# Patient Record
Sex: MALE | Race: ASIAN | NOT HISPANIC OR LATINO
[De-identification: names, ages, dates, MRNs, and addresses within clinical notes are randomized per-mention and may not be internally consistent; named-entity substitution may affect disease eponyms.]

---

## 2017-06-08 ENCOUNTER — APPOINTMENT (OUTPATIENT)
Dept: CARDIOLOGY | Facility: CLINIC | Age: 67
End: 2017-06-08

## 2017-06-22 ENCOUNTER — APPOINTMENT (OUTPATIENT)
Dept: CARDIOLOGY | Facility: CLINIC | Age: 67
End: 2017-06-22

## 2017-06-22 VITALS
HEIGHT: 66 IN | BODY MASS INDEX: 30.53 KG/M2 | HEART RATE: 61 BPM | SYSTOLIC BLOOD PRESSURE: 118 MMHG | RESPIRATION RATE: 15 BRPM | DIASTOLIC BLOOD PRESSURE: 70 MMHG | WEIGHT: 190 LBS

## 2017-08-24 ENCOUNTER — APPOINTMENT (OUTPATIENT)
Dept: CARDIOLOGY | Facility: CLINIC | Age: 67
End: 2017-08-24

## 2018-01-11 ENCOUNTER — APPOINTMENT (OUTPATIENT)
Dept: CARDIOLOGY | Facility: CLINIC | Age: 68
End: 2018-01-11
Payer: MEDICARE

## 2018-01-11 VITALS
HEART RATE: 74 BPM | SYSTOLIC BLOOD PRESSURE: 112 MMHG | DIASTOLIC BLOOD PRESSURE: 64 MMHG | BODY MASS INDEX: 29.73 KG/M2 | WEIGHT: 185 LBS | RESPIRATION RATE: 16 BRPM | HEIGHT: 66 IN

## 2018-01-11 PROCEDURE — 99214 OFFICE O/P EST MOD 30 MIN: CPT

## 2018-04-12 ENCOUNTER — APPOINTMENT (OUTPATIENT)
Dept: CARDIOLOGY | Facility: CLINIC | Age: 68
End: 2018-04-12
Payer: MEDICARE

## 2018-04-12 VITALS
HEART RATE: 60 BPM | TEMPERATURE: 98.9 F | BODY MASS INDEX: 28.08 KG/M2 | OXYGEN SATURATION: 97 % | RESPIRATION RATE: 17 BRPM | DIASTOLIC BLOOD PRESSURE: 83 MMHG | SYSTOLIC BLOOD PRESSURE: 135 MMHG | WEIGHT: 174 LBS

## 2018-04-12 PROCEDURE — 93000 ELECTROCARDIOGRAM COMPLETE: CPT

## 2018-04-12 PROCEDURE — 99215 OFFICE O/P EST HI 40 MIN: CPT

## 2018-05-01 ENCOUNTER — APPOINTMENT (OUTPATIENT)
Dept: CARDIOLOGY | Facility: CLINIC | Age: 68
End: 2018-05-01
Payer: MEDICARE

## 2018-05-01 PROCEDURE — A9500: CPT

## 2018-05-01 PROCEDURE — 93015 CV STRESS TEST SUPVJ I&R: CPT

## 2018-05-01 PROCEDURE — 93306 TTE W/DOPPLER COMPLETE: CPT

## 2018-05-01 PROCEDURE — 78452 HT MUSCLE IMAGE SPECT MULT: CPT

## 2018-05-10 ENCOUNTER — APPOINTMENT (OUTPATIENT)
Dept: CARDIOLOGY | Facility: CLINIC | Age: 68
End: 2018-05-10
Payer: MEDICARE

## 2018-05-10 VITALS
DIASTOLIC BLOOD PRESSURE: 73 MMHG | RESPIRATION RATE: 18 BRPM | HEART RATE: 51 BPM | SYSTOLIC BLOOD PRESSURE: 142 MMHG | WEIGHT: 175 LBS | BODY MASS INDEX: 28.25 KG/M2 | OXYGEN SATURATION: 97 % | TEMPERATURE: 97.8 F

## 2018-05-10 PROCEDURE — 99215 OFFICE O/P EST HI 40 MIN: CPT | Mod: 25

## 2018-05-10 PROCEDURE — 99406 BEHAV CHNG SMOKING 3-10 MIN: CPT

## 2018-05-14 VITALS
SYSTOLIC BLOOD PRESSURE: 138 MMHG | HEART RATE: 55 BPM | HEIGHT: 66 IN | RESPIRATION RATE: 18 BRPM | TEMPERATURE: 98 F | DIASTOLIC BLOOD PRESSURE: 75 MMHG | OXYGEN SATURATION: 98 % | WEIGHT: 190.04 LBS

## 2018-05-14 NOTE — H&P ADULT - FAMILY HISTORY
Sibling  Still living? No  Family history of stroke, Age at diagnosis: Age Unknown  Family history of cerebrovascular accident (CVA), Age at diagnosis: 71-80  Family history of acute myocardial infarction, Age at diagnosis: 71-80     Mother  Still living? Unknown  Family history of cerebrovascular accident (CVA), Age at diagnosis: 71-80

## 2018-05-14 NOTE — H&P ADULT - HISTORY OF PRESENT ILLNESS
**Obtained information from pt's wife Noah Wong (reliable)  ***VERIFY MEDICATION    67 yo Upper sorbian speaking, CURRENT SMOKER (has smoked for 30 years) male with PMHx of HTN, HLD, CAD (known residual mLAD 100%, dLAD 100% with retrograde filling from RCA s/p failed  PCI of LAD in 1/2015), chronic systolic CHF (EF 35%, moderate-severe MR), DM II, BPH who presented to Dr. Fabian's office with c/o progressively worsening fatigue and diaphoresis upon exertion of 5-10 city blocks resolving with minutes of rest over past few months.  Pt denies SOB, dizziness, palpitations,  LE edema, orthopnea, PND, syncope, N/V. EKG on 4/12/18 revealed NSR + LBBB. NST on 5/1/18 revealed LV enlarged, medium sized to moderate/severe defects in anterior and anteroseptal, apical walls that are fixed, suggestive of moderate lAD infarction with minimal kamilah-infarct ischemia. Medium sized, moderate to severe defects in inferior and inferolateral walls partially reversible, suggestive of moderate RCA/LCx infarction with mild kamilah-infarct ischemia. , ECHO 5/1/18 revealed MVP involving posterior mitral leaflet, mitral annual calcification, moderate MR, normal LV internal dimensions and wall thickness, severe segmental LV systolic dysfunction (apex, mid to distal anterior and anteroseptal walls appear kinetic, overall LVEF 35%), mild diastolic dysfunction (stage I).  Due to pts risk factors, CCS Angina Class II Equivalent Sx, abnormal NST and known physiologically significant lesion, pt is referred for cardiac catheterization w/ possible intervention.     Cath hx:  1/15/15 @ [facility]: LMCA normal, pLAD with minor luminal irregularities, mLAD 100%, dLAD 100% with retrograde filling from RCA with failed  PCI of LAD with recommendation to reattempt in 6 weeks.

## 2018-05-14 NOTE — H&P ADULT - ASSESSMENT
69 yo Lao speaking, CURRENT SMOKER (has smoked for 30 years) male with PMHx of HTN, HLD, CAD (known residual mLAD 100%, dLAD 100% with retrograde filling from RCA s/p failed  PCI of LAD in 1/2015), chronic systolic CHF (EF 35%, moderate-severe MR), DM II, BPH who presents for recommended LHC with possible intervention if clinically indicated secondary to CCS Anginal Class 2 Equivalent Symptoms in the setting of an abnormal NST.        ASA: III and Mallampati: III    -Precath/Consented  - IVF Hydration NS @ 75cc/hr.  -Loaded with ASA 325mg PO X 1 and Plavix 600mg PO X 1 69 yo Bulgarian speaking, CURRENT SMOKER (has smoked for 30 years) male with PMHx of HTN, HLD, CAD (known residual mLAD 100%, dLAD 100% with retrograde filling from RCA s/p failed  PCI of LAD in 1/2015), chronic systolic CHF (EF 35%, moderate-severe MR), DM II, BPH who presents for recommended LHC with possible intervention if clinically indicated secondary to CCS Anginal Class 2 Equivalent Symptoms in the setting of an abnormal NST.        ASA: III and Mallampati: III    -Precath/Consented  - Chronic Systolic HF w/ moderate to severe MR.  Will Keep IVF Hydration NS @ 50cc/hr.  -Pt received ASA 325mg PO X 1 and Plavix 75mg PO X 1 67 yo Swedish speaking, CURRENT SMOKER (has smoked for 30 years) male with PMHx of HTN, HLD, CAD (known residual mLAD 100%, dLAD 100% with retrograde filling from RCA s/p failed  PCI of LAD in 1/2015), chronic systolic CHF (EF 35%, moderate-severe MR), DM II, BPH who presents for recommended LHC with possible intervention if clinically indicated secondary to CCS Anginal Class 2 Equivalent Symptoms in the setting of an abnormal NST.        ASA: III and Mallampati: III    -Precath/Consented  - Chronic Systolic HF w/ moderate to severe MR.  Will Keep IVF Hydration 1/2 NS @ 50cc/hr.  -Pt received ASA 325mg PO X 1 and Plavix 75mg PO X 1

## 2018-05-14 NOTE — H&P ADULT - NSHPSOCIALHISTORY_GEN_ALL_CORE
Denies alcohol/elicit drug use.    Has smoked for 30 years (used to smoke 1 PPD but recently cut back to 1 pack per week)

## 2018-05-14 NOTE — H&P ADULT - PMH
Atherosclerosis of coronary artery  CAD (coronary artery disease)  Benign prostatic hypertrophy    DM (diabetes mellitus)    Essential hypertension  Hypertension  HLD (hyperlipidemia)    HTN (hypertension)    Hyperlipidemia  Hyperlipidemia  Type 2 diabetes mellitus  Diabetes

## 2018-05-15 ENCOUNTER — INPATIENT (INPATIENT)
Facility: HOSPITAL | Age: 68
LOS: 0 days | Discharge: HOME | DRG: 247 | End: 2018-05-16
Attending: INTERNAL MEDICINE | Admitting: INTERNAL MEDICINE
Payer: COMMERCIAL

## 2018-05-15 LAB
GLUCOSE BLDC GLUCOMTR-MCNC: 108 MG/DL — HIGH (ref 70–99)
GLUCOSE BLDC GLUCOMTR-MCNC: 108 MG/DL — HIGH (ref 70–99)
GLUCOSE BLDC GLUCOMTR-MCNC: 121 MG/DL — HIGH (ref 70–99)
GLUCOSE BLDC GLUCOMTR-MCNC: 170 MG/DL — HIGH (ref 70–99)

## 2018-05-15 PROCEDURE — 93010 ELECTROCARDIOGRAM REPORT: CPT

## 2018-05-15 PROCEDURE — 99222 1ST HOSP IP/OBS MODERATE 55: CPT

## 2018-05-15 PROCEDURE — 92928 PRQ TCAT PLMT NTRAC ST 1 LES: CPT | Mod: RC

## 2018-05-15 PROCEDURE — 93458 L HRT ARTERY/VENTRICLE ANGIO: CPT | Mod: 26,XU

## 2018-05-15 RX ORDER — CLOPIDOGREL BISULFATE 75 MG/1
75 TABLET, FILM COATED ORAL DAILY
Qty: 0 | Refills: 0 | Status: DISCONTINUED | OUTPATIENT
Start: 2018-05-15 | End: 2018-05-16

## 2018-05-15 RX ORDER — MOMETASONE FUROATE 1 MG/G
1 CREAM TOPICAL
Qty: 0 | Refills: 0 | COMMUNITY

## 2018-05-15 RX ORDER — DEXTROSE 50 % IN WATER 50 %
25 SYRINGE (ML) INTRAVENOUS ONCE
Qty: 0 | Refills: 0 | Status: DISCONTINUED | OUTPATIENT
Start: 2018-05-15 | End: 2018-05-16

## 2018-05-15 RX ORDER — ATORVASTATIN CALCIUM 80 MG/1
40 TABLET, FILM COATED ORAL DAILY
Qty: 0 | Refills: 0 | Status: DISCONTINUED | OUTPATIENT
Start: 2018-05-15 | End: 2018-05-16

## 2018-05-15 RX ORDER — DEXTROSE 50 % IN WATER 50 %
12.5 SYRINGE (ML) INTRAVENOUS ONCE
Qty: 0 | Refills: 0 | Status: DISCONTINUED | OUTPATIENT
Start: 2018-05-15 | End: 2018-05-16

## 2018-05-15 RX ORDER — METOPROLOL TARTRATE 50 MG
50 TABLET ORAL DAILY
Qty: 0 | Refills: 0 | Status: DISCONTINUED | OUTPATIENT
Start: 2018-05-15 | End: 2018-05-16

## 2018-05-15 RX ORDER — AMLODIPINE BESYLATE 2.5 MG/1
5 TABLET ORAL DAILY
Qty: 0 | Refills: 0 | Status: DISCONTINUED | OUTPATIENT
Start: 2018-05-15 | End: 2018-05-16

## 2018-05-15 RX ORDER — SODIUM CHLORIDE 9 MG/ML
1000 INJECTION, SOLUTION INTRAVENOUS
Qty: 0 | Refills: 0 | Status: DISCONTINUED | OUTPATIENT
Start: 2018-05-15 | End: 2018-05-16

## 2018-05-15 RX ORDER — LISINOPRIL 2.5 MG/1
20 TABLET ORAL DAILY
Qty: 0 | Refills: 0 | Status: DISCONTINUED | OUTPATIENT
Start: 2018-05-16 | End: 2018-05-16

## 2018-05-15 RX ORDER — SODIUM CHLORIDE 9 MG/ML
500 INJECTION, SOLUTION INTRAVENOUS
Qty: 0 | Refills: 0 | Status: DISCONTINUED | OUTPATIENT
Start: 2018-05-15 | End: 2018-05-16

## 2018-05-15 RX ORDER — METOPROLOL TARTRATE 50 MG
1 TABLET ORAL
Qty: 0 | Refills: 0 | COMMUNITY

## 2018-05-15 RX ORDER — HYDROCHLOROTHIAZIDE 25 MG
25 TABLET ORAL DAILY
Qty: 0 | Refills: 0 | Status: DISCONTINUED | OUTPATIENT
Start: 2018-05-15 | End: 2018-05-16

## 2018-05-15 RX ORDER — DEXTROSE 50 % IN WATER 50 %
15 SYRINGE (ML) INTRAVENOUS ONCE
Qty: 0 | Refills: 0 | Status: DISCONTINUED | OUTPATIENT
Start: 2018-05-15 | End: 2018-05-16

## 2018-05-15 RX ORDER — ASPIRIN/CALCIUM CARB/MAGNESIUM 324 MG
81 TABLET ORAL DAILY
Qty: 0 | Refills: 0 | Status: DISCONTINUED | OUTPATIENT
Start: 2018-05-15 | End: 2018-05-16

## 2018-05-15 RX ORDER — CLOPIDOGREL BISULFATE 75 MG/1
1 TABLET, FILM COATED ORAL
Qty: 0 | Refills: 0 | COMMUNITY

## 2018-05-15 RX ORDER — INSULIN LISPRO 100/ML
VIAL (ML) SUBCUTANEOUS
Qty: 0 | Refills: 0 | Status: DISCONTINUED | OUTPATIENT
Start: 2018-05-15 | End: 2018-05-16

## 2018-05-15 RX ORDER — GLUCAGON INJECTION, SOLUTION 0.5 MG/.1ML
1 INJECTION, SOLUTION SUBCUTANEOUS ONCE
Qty: 0 | Refills: 0 | Status: DISCONTINUED | OUTPATIENT
Start: 2018-05-15 | End: 2018-05-16

## 2018-05-15 RX ORDER — TAMSULOSIN HYDROCHLORIDE 0.4 MG/1
0.4 CAPSULE ORAL DAILY
Qty: 0 | Refills: 0 | Status: DISCONTINUED | OUTPATIENT
Start: 2018-05-15 | End: 2018-05-16

## 2018-05-15 RX ADMIN — ATORVASTATIN CALCIUM 40 MILLIGRAM(S): 80 TABLET, FILM COATED ORAL at 17:41

## 2018-05-15 RX ADMIN — TAMSULOSIN HYDROCHLORIDE 0.4 MILLIGRAM(S): 0.4 CAPSULE ORAL at 17:41

## 2018-05-15 NOTE — PATIENT PROFILE ADULT. - SPIRITUAL CULTURAL, RELIGIOUS PRACTICES/VALUES, PROFILE
ED Medical Screen (RME)





- General


Chief Complaint: Fall Injury


Stated Complaint: FALL/SHOULDER AND NECK PAIN


Time Seen by Provider: 01/31/18 13:52


Mode of Arrival: Ambulatory


Information source: Patient


TRAVEL OUTSIDE OF THE [place] IN LAST 30 DAYS: No





- HPI


Patient complains to provider of: fall


Onset: Yesterday - pt fell last night (has had recent neck surgery) and has 

pain in neck, L shoulder and hip.  Denies LOC





- Related Data


Allergies/Adverse Reactions: 


 





Penicillins Allergy (Verified 01/31/18 13:22)


 











Past Medical History





- Social History


Frequency of alcohol use: Rare


Drug Abuse: None


Renal/ Medical History: Denies: Hx Peritoneal Dialysis





Physical Exam





- Vital signs


Vitals: 





 











Temp Pulse Resp BP Pulse Ox


 


 98.4 F   125 H  20   109/54 L  99 


 


 01/31/18 13:30  01/31/18 13:30  01/31/18 13:30  01/31/18 13:30  01/31/18 13:30














Course





- Vital Signs


Vital signs: 





 











Temp Pulse Resp BP Pulse Ox


 


 98.4 F   125 H  20   109/54 L  99 


 


 01/31/18 13:30  01/31/18 13:30  01/31/18 13:30  01/31/18 13:30  01/31/18 13:30 none

## 2018-05-16 ENCOUNTER — TRANSCRIPTION ENCOUNTER (OUTPATIENT)
Age: 68
End: 2018-05-16

## 2018-05-16 VITALS — WEIGHT: 189.6 LBS

## 2018-05-16 LAB
ALBUMIN SERPL ELPH-MCNC: 4.2 G/DL — SIGNIFICANT CHANGE UP (ref 3.3–5)
ALP SERPL-CCNC: 72 U/L — SIGNIFICANT CHANGE UP (ref 40–120)
ALT FLD-CCNC: 35 U/L — SIGNIFICANT CHANGE UP (ref 10–45)
ANION GAP SERPL CALC-SCNC: 10 MMOL/L — SIGNIFICANT CHANGE UP (ref 5–17)
AST SERPL-CCNC: 24 U/L — SIGNIFICANT CHANGE UP (ref 10–40)
BILIRUB SERPL-MCNC: 0.5 MG/DL — SIGNIFICANT CHANGE UP (ref 0.2–1.2)
BUN SERPL-MCNC: 20 MG/DL — SIGNIFICANT CHANGE UP (ref 7–23)
CALCIUM SERPL-MCNC: 9.3 MG/DL — SIGNIFICANT CHANGE UP (ref 8.4–10.5)
CHLORIDE SERPL-SCNC: 100 MMOL/L — SIGNIFICANT CHANGE UP (ref 96–108)
CO2 SERPL-SCNC: 29 MMOL/L — SIGNIFICANT CHANGE UP (ref 22–31)
CREAT SERPL-MCNC: 1.26 MG/DL — SIGNIFICANT CHANGE UP (ref 0.5–1.3)
GLUCOSE BLDC GLUCOMTR-MCNC: 126 MG/DL — HIGH (ref 70–99)
GLUCOSE BLDC GLUCOMTR-MCNC: 199 MG/DL — HIGH (ref 70–99)
GLUCOSE SERPL-MCNC: 129 MG/DL — HIGH (ref 70–99)
HCT VFR BLD CALC: 43.3 % — SIGNIFICANT CHANGE UP (ref 39–50)
HGB BLD-MCNC: 14.8 G/DL — SIGNIFICANT CHANGE UP (ref 13–17)
MAGNESIUM SERPL-MCNC: 2 MG/DL — SIGNIFICANT CHANGE UP (ref 1.6–2.6)
MCHC RBC-ENTMCNC: 29.6 PG — SIGNIFICANT CHANGE UP (ref 27–34)
MCHC RBC-ENTMCNC: 34.2 G/DL — SIGNIFICANT CHANGE UP (ref 32–36)
MCV RBC AUTO: 86.6 FL — SIGNIFICANT CHANGE UP (ref 80–100)
PLATELET # BLD AUTO: 150 K/UL — SIGNIFICANT CHANGE UP (ref 150–400)
POTASSIUM SERPL-MCNC: 4.1 MMOL/L — SIGNIFICANT CHANGE UP (ref 3.5–5.3)
POTASSIUM SERPL-SCNC: 4.1 MMOL/L — SIGNIFICANT CHANGE UP (ref 3.5–5.3)
PROT SERPL-MCNC: 7 G/DL — SIGNIFICANT CHANGE UP (ref 6–8.3)
RBC # BLD: 5 M/UL — SIGNIFICANT CHANGE UP (ref 4.2–5.8)
RBC # FLD: 13.3 % — SIGNIFICANT CHANGE UP (ref 10.3–16.9)
SODIUM SERPL-SCNC: 139 MMOL/L — SIGNIFICANT CHANGE UP (ref 135–145)
WBC # BLD: 8.2 K/UL — SIGNIFICANT CHANGE UP (ref 3.8–10.5)
WBC # FLD AUTO: 8.2 K/UL — SIGNIFICANT CHANGE UP (ref 3.8–10.5)

## 2018-05-16 PROCEDURE — 93010 ELECTROCARDIOGRAM REPORT: CPT

## 2018-05-16 PROCEDURE — 99217: CPT

## 2018-05-16 RX ORDER — ASPIRIN/CALCIUM CARB/MAGNESIUM 324 MG
1 TABLET ORAL
Qty: 30 | Refills: 11
Start: 2018-05-16 | End: 2019-05-10

## 2018-05-16 RX ORDER — CLOPIDOGREL BISULFATE 75 MG/1
1 TABLET, FILM COATED ORAL
Qty: 30 | Refills: 11
Start: 2018-05-16 | End: 2019-05-10

## 2018-05-16 RX ORDER — ATORVASTATIN CALCIUM 80 MG/1
1 TABLET, FILM COATED ORAL
Qty: 30 | Refills: 0
Start: 2018-05-16 | End: 2018-06-14

## 2018-05-16 RX ORDER — ATORVASTATIN CALCIUM 80 MG/1
1 TABLET, FILM COATED ORAL
Qty: 30 | Refills: 0 | OUTPATIENT
Start: 2018-05-16 | End: 2018-06-14

## 2018-05-16 RX ADMIN — Medication 81 MILLIGRAM(S): at 11:14

## 2018-05-16 RX ADMIN — Medication 1: at 11:47

## 2018-05-16 RX ADMIN — ATORVASTATIN CALCIUM 40 MILLIGRAM(S): 80 TABLET, FILM COATED ORAL at 11:14

## 2018-05-16 RX ADMIN — TAMSULOSIN HYDROCHLORIDE 0.4 MILLIGRAM(S): 0.4 CAPSULE ORAL at 11:14

## 2018-05-16 RX ADMIN — Medication 50 MILLIGRAM(S): at 06:28

## 2018-05-16 RX ADMIN — CLOPIDOGREL BISULFATE 75 MILLIGRAM(S): 75 TABLET, FILM COATED ORAL at 11:14

## 2018-05-16 RX ADMIN — LISINOPRIL 20 MILLIGRAM(S): 2.5 TABLET ORAL at 06:30

## 2018-05-16 RX ADMIN — AMLODIPINE BESYLATE 5 MILLIGRAM(S): 2.5 TABLET ORAL at 06:28

## 2018-05-16 RX ADMIN — Medication 25 MILLIGRAM(S): at 06:28

## 2018-05-16 NOTE — DISCHARGE NOTE ADULT - PLAN OF CARE
MD follow-up, Medication Compliance CONTINUE ASPIRIN AND PLAVIX (CLOPIDOGREL) DAILY. Continue current listed medical regimen. See Dr. Fabian on 5/31/18. See Primary Doctor in 1 week  Monitor right groin/leg for swelling, bleeding, discharge, pain or skin discoloration if any of these findings are noted go to nearest ER, seek medical attention immediately and call [phone] if any questions. If chest pain, shortness of breath, dizziness noted call MD immediately and seek medical attention.  Avoid hot tubs, swimming pools and baths for 1 week. Avoid lifting more than 3 pounds for one week, avoid exertional movements such intimacy, running, jumping, etc for 1 week Continue listed medical regimen. MD follow-up Lipitor increased to 40mg daily MD follow-up Monitor weight daily. If lower extremity swelling or shortness of breath or increased weight gain noted call MD and seek immediate medical attention. Low salt diet. 1 liter fluid restriction. Continue current medical regimen. See Dr. Fabian on 5/31/18. See Primary Doctor in 1 week HOLD METFORMIN AND RESUME ON 5/18/18. Check fingerstick three times daily before meals and at bedtime. If fingerstick greater than 200 or less than 80 call MD for further instructions. Avoid concentrated sweets. Follow diabetic diet. MD follow-up Lipitor increased to 40mg daily. MD follow-up

## 2018-05-16 NOTE — DISCHARGE NOTE ADULT - ADDITIONAL INSTRUCTIONS
*Dr. Fabian will schedule a CAT scan on follow-up to further evaluate your aorta for dilation*. Dr. Fabian is aware

## 2018-05-16 NOTE — DISCHARGE NOTE ADULT - PATIENT PORTAL LINK FT
You can access the [facility] Portal, offered by [facility], by registering with the following website: [email or web address]

## 2018-05-16 NOTE — DISCHARGE NOTE ADULT - MEDICATION SUMMARY - MEDICATIONS TO CHANGE
I will SWITCH the dose or number of times a day I take the medications listed below when I get home from the hospital:  None I will SWITCH the dose or number of times a day I take the medications listed below when I get home from the hospital:    atorvastatin 20 mg oral tablet  -- 1 tab(s) by mouth once a day (at bedtime)

## 2018-05-16 NOTE — DISCHARGE NOTE ADULT - MEDICATION SUMMARY - MEDICATIONS TO TAKE
I will START or STAY ON the medications listed below when I get home from the hospital:    Aspirin Enteric Coated 81 mg oral delayed release tablet  -- 1 tab(s) by mouth once a day  -- Indication: For Coronary Artery Disease and Stent    Flomax 0.4 mg oral capsule  -- 1 cap(s) by mouth once a day  -- Indication: For Benign Prostate Hypertrophy    metFORMIN 500 mg oral tablet  -- 1 tab(s) by mouth 2 times a day  HOLD AND RESTART ON 5/18/18  -- Indication: For Diabetes - Hold and Restart on 5/18/18    atorvastatin 40 mg oral tablet  -- 1 tab(s) by mouth once a day  -- Indication: For Cholesterol / Coronary Artery Disease    lisinopril-hydroCHLOROthiazide 20 mg-25 mg oral tablet  -- 1 tab(s) by mouth once a day  -- Indication: For Hypertension    Plavix 75 mg oral tablet  -- 1 tab(s) by mouth once a day  -- Indication: For Coronary Artery Disease and Stent    metoprolol extended release 50 mg oral tablet, extended release  -- 1 tab(s) by mouth once a day  -- Indication: For Hypertension / Coronary Artery Disease     amLODIPine 5 mg oral tablet  -- 1 tab(s) by mouth once a day  -- Indication: For Hypertension

## 2018-05-16 NOTE — DISCHARGE NOTE ADULT - SECONDARY DIAGNOSIS.
HTN (hypertension) HLD (hyperlipidemia) DM (diabetes mellitus) Chronic systolic congestive heart failure

## 2018-05-16 NOTE — DISCHARGE NOTE ADULT - CARE PROVIDER_API CALL
Vivek Fabian), Cardiovascular Disease; Internal Medicine  [address] [place] [address]  Phone: [phone]  Fax: [phone]

## 2018-05-16 NOTE — DISCHARGE NOTE ADULT - HOSPITAL COURSE
69 y/o Portuguese Speaking, Current Smoker w/ PMHX HTN, HLD, DM CAD s/p Prior PCI, Chronic Systolic CHF EF 35%, Known MR, Known CAD, w/ CCS Angina Class 2 Sx, Abnormal NST    S/p Cardiac Cath 5/15/18: HUGO RPDA, EF 35%, dilated ascending aorta noted on cath, for outpatient CT Angiography as per Dr. Wong    This AM VSS, Pt Asymptomatic, Groin Stable, Labs and EKG reviewed. Pt euvolemic on exam.   Pt stable for D/C home as per Dr. Harrison on ASA/Plavix, Metoprolol, Lipitor, Lisinopril, Norvasc. See Dr. Fabian on 5/31/18. See Primary Doctor in 1 week  Pt seen and examined bedside via [employer]  ID [id number]  Patient denies C/P, SOB, N/V, dizziness, palpitations, and diaphoresis.  Pt denies fever/chills, dysuria, abdominal pain, diarrhea, and cough  	  V/S 	BP: 120s/60s 	HR: 60s	 RR: 16	   T: 98	       O2: 98% RA   	  GEN: NAD  PULM:  CTA B/L  CARD: No JVD B/L, RRR, S1 and S2   ABD: +BS, NT, soft/ND	  EXT: No Edema B/L LE, Distal Pulses Intact and At Baseline  R GROIN: soft, no hematoma, no bleeding, no femoral bruit  NEURO: A+Ox3, no focal deficit                          14.8   8.2   )-----------( 150      ( 16 May 2018 06:34 )             43.3     05-16    139  |  100  |  20  ----------------------------<  129<H>  4.1   |  29  |  1.26    Ca    9.3      16 May 2018 06:34  Mg     2.0     05-16    TPro  7.0  /  Alb  4.2  /  TBili  0.5  /  DBili  x   /  AST  24  /  ALT  35  /  AlkPhos  72  05-16

## 2018-05-18 DIAGNOSIS — E11.9 TYPE 2 DIABETES MELLITUS WITHOUT COMPLICATIONS: ICD-10-CM

## 2018-05-18 DIAGNOSIS — F17.210 NICOTINE DEPENDENCE, CIGARETTES, UNCOMPLICATED: ICD-10-CM

## 2018-05-18 DIAGNOSIS — I25.10 ATHEROSCLEROTIC HEART DISEASE OF NATIVE CORONARY ARTERY WITHOUT ANGINA PECTORIS: ICD-10-CM

## 2018-05-18 DIAGNOSIS — E78.5 HYPERLIPIDEMIA, UNSPECIFIED: ICD-10-CM

## 2018-05-18 DIAGNOSIS — N40.0 BENIGN PROSTATIC HYPERPLASIA WITHOUT LOWER URINARY TRACT SYMPTOMS: ICD-10-CM

## 2018-05-18 DIAGNOSIS — I34.0 NONRHEUMATIC MITRAL (VALVE) INSUFFICIENCY: ICD-10-CM

## 2018-05-18 DIAGNOSIS — Z79.84 LONG TERM (CURRENT) USE OF ORAL HYPOGLYCEMIC DRUGS: ICD-10-CM

## 2018-05-18 DIAGNOSIS — I50.22 CHRONIC SYSTOLIC (CONGESTIVE) HEART FAILURE: ICD-10-CM

## 2018-05-18 DIAGNOSIS — I11.0 HYPERTENSIVE HEART DISEASE WITH HEART FAILURE: ICD-10-CM

## 2018-05-23 PROCEDURE — 86140 C-REACTIVE PROTEIN: CPT

## 2018-05-23 PROCEDURE — C1874: CPT

## 2018-05-23 PROCEDURE — 80061 LIPID PANEL: CPT

## 2018-05-23 PROCEDURE — 83735 ASSAY OF MAGNESIUM: CPT

## 2018-05-23 PROCEDURE — C1725: CPT

## 2018-05-23 PROCEDURE — 83036 HEMOGLOBIN GLYCOSYLATED A1C: CPT

## 2018-05-23 PROCEDURE — C1769: CPT

## 2018-05-23 PROCEDURE — C1887: CPT

## 2018-05-23 PROCEDURE — 82553 CREATINE MB FRACTION: CPT

## 2018-05-23 PROCEDURE — C1760: CPT

## 2018-05-23 PROCEDURE — 80048 BASIC METABOLIC PNL TOTAL CA: CPT

## 2018-05-23 PROCEDURE — C1889: CPT

## 2018-05-23 PROCEDURE — 80053 COMPREHEN METABOLIC PANEL: CPT

## 2018-05-23 PROCEDURE — 82550 ASSAY OF CK (CPK): CPT

## 2018-05-23 PROCEDURE — 85610 PROTHROMBIN TIME: CPT

## 2018-05-23 PROCEDURE — 85730 THROMBOPLASTIN TIME PARTIAL: CPT

## 2018-05-23 PROCEDURE — 84484 ASSAY OF TROPONIN QUANT: CPT

## 2018-05-23 PROCEDURE — C9600: CPT

## 2018-05-23 PROCEDURE — 93458 L HRT ARTERY/VENTRICLE ANGIO: CPT

## 2018-05-23 PROCEDURE — C1894: CPT

## 2018-05-23 PROCEDURE — 85025 COMPLETE CBC W/AUTO DIFF WBC: CPT

## 2018-05-23 PROCEDURE — 85027 COMPLETE CBC AUTOMATED: CPT

## 2018-05-23 PROCEDURE — 36415 COLL VENOUS BLD VENIPUNCTURE: CPT

## 2018-05-23 PROCEDURE — 82962 GLUCOSE BLOOD TEST: CPT

## 2018-05-23 PROCEDURE — 93005 ELECTROCARDIOGRAM TRACING: CPT

## 2018-05-31 ENCOUNTER — APPOINTMENT (OUTPATIENT)
Dept: CARDIOLOGY | Facility: CLINIC | Age: 68
End: 2018-05-31
Payer: MEDICARE

## 2018-05-31 VITALS
OXYGEN SATURATION: 97 % | TEMPERATURE: 98.4 F | HEART RATE: 68 BPM | DIASTOLIC BLOOD PRESSURE: 66 MMHG | WEIGHT: 190 LBS | RESPIRATION RATE: 18 BRPM | SYSTOLIC BLOOD PRESSURE: 120 MMHG | BODY MASS INDEX: 30.67 KG/M2

## 2018-05-31 PROCEDURE — 99214 OFFICE O/P EST MOD 30 MIN: CPT

## 2018-05-31 PROCEDURE — 99406 BEHAV CHNG SMOKING 3-10 MIN: CPT

## 2018-06-25 ENCOUNTER — NON-APPOINTMENT (OUTPATIENT)
Age: 68
End: 2018-06-25

## 2018-06-25 ENCOUNTER — APPOINTMENT (OUTPATIENT)
Dept: CARDIOLOGY | Facility: CLINIC | Age: 68
End: 2018-06-25
Payer: MEDICARE

## 2018-06-25 VITALS
TEMPERATURE: 98.1 F | SYSTOLIC BLOOD PRESSURE: 122 MMHG | DIASTOLIC BLOOD PRESSURE: 74 MMHG | BODY MASS INDEX: 30.67 KG/M2 | HEART RATE: 53 BPM | OXYGEN SATURATION: 98 % | RESPIRATION RATE: 17 BRPM | WEIGHT: 190 LBS

## 2018-06-25 PROCEDURE — 93000 ELECTROCARDIOGRAM COMPLETE: CPT

## 2018-06-25 PROCEDURE — 99406 BEHAV CHNG SMOKING 3-10 MIN: CPT

## 2018-06-25 PROCEDURE — 99214 OFFICE O/P EST MOD 30 MIN: CPT | Mod: 25

## 2018-08-20 ENCOUNTER — APPOINTMENT (OUTPATIENT)
Dept: CARDIOLOGY | Facility: CLINIC | Age: 68
End: 2018-08-20
Payer: MEDICARE

## 2018-08-20 VITALS
DIASTOLIC BLOOD PRESSURE: 69 MMHG | OXYGEN SATURATION: 97 % | RESPIRATION RATE: 17 BRPM | SYSTOLIC BLOOD PRESSURE: 113 MMHG | BODY MASS INDEX: 30.67 KG/M2 | WEIGHT: 190 LBS | HEART RATE: 54 BPM | TEMPERATURE: 98.5 F

## 2018-08-20 PROCEDURE — 93000 ELECTROCARDIOGRAM COMPLETE: CPT

## 2018-08-20 PROCEDURE — 99214 OFFICE O/P EST MOD 30 MIN: CPT

## 2018-10-22 ENCOUNTER — NON-APPOINTMENT (OUTPATIENT)
Age: 68
End: 2018-10-22

## 2018-10-22 ENCOUNTER — APPOINTMENT (OUTPATIENT)
Dept: CARDIOLOGY | Facility: CLINIC | Age: 68
End: 2018-10-22
Payer: MEDICARE

## 2018-10-22 VITALS
WEIGHT: 192 LBS | BODY MASS INDEX: 30.99 KG/M2 | HEART RATE: 57 BPM | TEMPERATURE: 98.5 F | RESPIRATION RATE: 17 BRPM | OXYGEN SATURATION: 97 % | DIASTOLIC BLOOD PRESSURE: 81 MMHG | SYSTOLIC BLOOD PRESSURE: 138 MMHG

## 2018-10-22 PROCEDURE — 93000 ELECTROCARDIOGRAM COMPLETE: CPT

## 2018-10-22 PROCEDURE — 99214 OFFICE O/P EST MOD 30 MIN: CPT

## 2019-02-11 ENCOUNTER — APPOINTMENT (OUTPATIENT)
Dept: CARDIOLOGY | Facility: CLINIC | Age: 69
End: 2019-02-11
Payer: MEDICARE

## 2019-02-11 VITALS
TEMPERATURE: 98.3 F | SYSTOLIC BLOOD PRESSURE: 127 MMHG | RESPIRATION RATE: 17 BRPM | OXYGEN SATURATION: 97 % | BODY MASS INDEX: 30.02 KG/M2 | DIASTOLIC BLOOD PRESSURE: 77 MMHG | HEART RATE: 58 BPM | WEIGHT: 186 LBS

## 2019-02-11 PROCEDURE — 93000 ELECTROCARDIOGRAM COMPLETE: CPT

## 2019-02-11 PROCEDURE — 99214 OFFICE O/P EST MOD 30 MIN: CPT

## 2019-02-11 NOTE — PHYSICAL EXAM
[General Appearance - Well Developed] : well developed [Normal Appearance] : normal appearance [Well Groomed] : well groomed [General Appearance - Well Nourished] : well nourished [No Deformities] : no deformities [General Appearance - In No Acute Distress] : no acute distress [Normal Conjunctiva] : the conjunctiva exhibited no abnormalities [Eyelids - No Xanthelasma] : the eyelids demonstrated no xanthelasmas [Normal Oral Mucosa] : normal oral mucosa [No Oral Pallor] : no oral pallor [No Oral Cyanosis] : no oral cyanosis [Normal Jugular Venous A Waves Present] : normal jugular venous A waves present [Normal Jugular Venous V Waves Present] : normal jugular venous V waves present [No Jugular Venous Lang A Waves] : no jugular venous lang A waves [Respiration, Rhythm And Depth] : normal respiratory rhythm and effort [Exaggerated Use Of Accessory Muscles For Inspiration] : no accessory muscle use [Auscultation Breath Sounds / Voice Sounds] : lungs were clear to auscultation bilaterally [Heart Rate And Rhythm] : heart rate and rhythm were normal [Heart Sounds] : normal S1 and S2 [FreeTextEntry1] : 3/6 SHAMA apex [Abdomen Soft] : soft [Abdomen Tenderness] : non-tender [Abdomen Mass (___ Cm)] : no abdominal mass palpated [Nail Clubbing] : no clubbing of the fingernails [Cyanosis, Localized] : no localized cyanosis [Petechial Hemorrhages (___cm)] : no petechial hemorrhages [Skin Color & Pigmentation] : normal skin color and pigmentation [] : no rash [No Venous Stasis] : no venous stasis [Skin Lesions] : no skin lesions [No Skin Ulcers] : no skin ulcer [No Xanthoma] : no  xanthoma was observed [Oriented To Time, Place, And Person] : oriented to person, place, and time [Affect] : the affect was normal [Mood] : the mood was normal [No Anxiety] : not feeling anxious

## 2019-02-11 NOTE — REVIEW OF SYSTEMS
[Shortness Of Breath] : shortness of breath [Chest Pain] : no chest pain [Palpitations] : no palpitations [Negative] : Heme/Lymph

## 2019-02-11 NOTE — REASON FOR VISIT
[Follow-Up - Clinic] : a clinic follow-up of [Coronary Artery Disease] : coronary artery disease [Heart Failure] : congestive heart failure [Hyperlipidemia] : hyperlipidemia [Hypertension] : hypertension [FreeTextEntry1] : 68 M HTN hyperlipidemia CAD s/p PCI CHF EF 35% MR for f/u.

## 2019-02-11 NOTE — HISTORY OF PRESENT ILLNESS
[FreeTextEntry1] : 1. HTN: on medications, BP controlled.\par 2. Hyperlipidemia: on statin. No SE noted. \par 3. CAD s/p PCI: He is compliant with medications but still smokes. The patient last had PCI in 5/2018. He denies CP or SOB and reports good ET. He is here today for pre-op assessment for head and neck surgery for squamous cell CA. \par 4. CHF: He has LVEF 35%. No SOB or leg edema.\par

## 2019-02-11 NOTE — DISCUSSION/SUMMARY
[FreeTextEntry1] : 68 M HTN hyperlipidemia CAD s/p PCI CHF LVEF 35% LBBB for pre-op assessment.\par 1.THE PATIENT IS AT AN INTERMEDIATE LEVEL OF CARDIOVASCULAR RISK (stable CAD, CHF) FOR INTERMEDIATE RISK SURGERY.\par 2. STOP ASA AND PLAVIX 7 DAYS PRIOR TO SURGERY.\par 3. LBBB IS AN OLD FINDING.\par 4. CONTINUE MEDS FOR HTN AND HYPERLIPIDEMIA.

## 2019-04-04 ENCOUNTER — APPOINTMENT (OUTPATIENT)
Dept: CARDIOLOGY | Facility: CLINIC | Age: 69
End: 2019-04-04
Payer: MEDICARE

## 2019-04-04 VITALS
HEART RATE: 56 BPM | BODY MASS INDEX: 28.89 KG/M2 | SYSTOLIC BLOOD PRESSURE: 135 MMHG | WEIGHT: 179 LBS | RESPIRATION RATE: 17 BRPM | TEMPERATURE: 98.4 F | OXYGEN SATURATION: 97 % | DIASTOLIC BLOOD PRESSURE: 82 MMHG

## 2019-04-04 PROCEDURE — 93000 ELECTROCARDIOGRAM COMPLETE: CPT

## 2019-04-04 PROCEDURE — 99214 OFFICE O/P EST MOD 30 MIN: CPT

## 2019-04-04 NOTE — HISTORY OF PRESENT ILLNESS
[FreeTextEntry1] : 1. HTN: on medications, no SE noted. \par 2. Hyperlipidemia: on statin. Lipid profile is followed by PMD.\par 3. CAD s/p PCI: He is compliant with medications but still smokes. The patient last had PCI in 5/2018. He was recently admitted to [facility] for ENT surgery for CA.\par 4. CHF: He has LVEF 35%. No SOB or leg edema.\par He denies CP and has preserved ET. \par He is compliant with medications.

## 2019-04-04 NOTE — DISCUSSION/SUMMARY
[FreeTextEntry1] : 69 HTN hyperlipidemia CAD s/p PCI CHF EF 35% MR for f/u. \par 1. COntinue ASA. Plavix can be stopped.\par Continue meds for HTN.\par COntinue statin.\par Monitor symptoms.\par  \par  \par

## 2019-04-04 NOTE — REASON FOR VISIT
[Follow-Up - Clinic] : a clinic follow-up of [Coronary Artery Disease] : coronary artery disease [Heart Failure] : congestive heart failure [Hyperlipidemia] : hyperlipidemia [Hypertension] : hypertension [FreeTextEntry1] : 69 M HTN hyperlipidemia CAD s/p PCI CHF EF 35% MR for f/u. \par  \par

## 2019-05-13 ENCOUNTER — APPOINTMENT (OUTPATIENT)
Dept: CARDIOLOGY | Facility: CLINIC | Age: 69
End: 2019-05-13
Payer: MEDICARE

## 2019-05-13 VITALS
DIASTOLIC BLOOD PRESSURE: 78 MMHG | RESPIRATION RATE: 17 BRPM | HEART RATE: 57 BPM | TEMPERATURE: 97.7 F | OXYGEN SATURATION: 98 % | WEIGHT: 177 LBS | BODY MASS INDEX: 28.57 KG/M2 | SYSTOLIC BLOOD PRESSURE: 135 MMHG

## 2019-05-13 PROCEDURE — 99214 OFFICE O/P EST MOD 30 MIN: CPT

## 2019-05-13 PROCEDURE — 93000 ELECTROCARDIOGRAM COMPLETE: CPT

## 2019-05-13 NOTE — HISTORY OF PRESENT ILLNESS
[FreeTextEntry1] : 1. HTN: on medications, compliant with medications.\par 2. Hyperlipidemia: on statin. No muscle pain is noted. \par 3. CAD s/p PCI: He is compliant with medications but still smokes. The patient last had PCI in 5/2018. He was recently admitted to [facility] for ENT surgery for CA.\par 4. CHF: He has LVEF 35%. Patient has been compliant with medications and he denies CP or SOB.\par ET is preserved.

## 2019-05-13 NOTE — REASON FOR VISIT
[Coronary Artery Disease] : coronary artery disease [Follow-Up - Clinic] : a clinic follow-up of [Hypertension] : hypertension [Heart Failure] : congestive heart failure [Hyperlipidemia] : hyperlipidemia [FreeTextEntry1] : 69 M HTN hyperlipidemia CAD s/p PCI CHF EF 35% MR for f/u. \par  [Mitral Regurgitation] : mitral regurgitation

## 2019-05-13 NOTE — DISCUSSION/SUMMARY
[FreeTextEntry1] : 69 M HTN hyperlipidemia CAD s/p PCI CHF LVEF 35% for f/u.\par Continue meds for HTN.\par Continue statin.\par ASA Plavix for CAD.\par Monitor CHF.

## 2019-06-17 ENCOUNTER — APPOINTMENT (OUTPATIENT)
Dept: CARDIOLOGY | Facility: CLINIC | Age: 69
End: 2019-06-17
Payer: MEDICARE

## 2019-06-17 VITALS
BODY MASS INDEX: 29.21 KG/M2 | DIASTOLIC BLOOD PRESSURE: 73 MMHG | HEART RATE: 62 BPM | WEIGHT: 181 LBS | SYSTOLIC BLOOD PRESSURE: 124 MMHG | RESPIRATION RATE: 17 BRPM | OXYGEN SATURATION: 97 % | TEMPERATURE: 98 F

## 2019-06-17 PROCEDURE — 93000 ELECTROCARDIOGRAM COMPLETE: CPT

## 2019-06-17 PROCEDURE — 99214 OFFICE O/P EST MOD 30 MIN: CPT

## 2019-06-17 NOTE — PHYSICAL EXAM
[General Appearance - Well Developed] : well developed [Normal Appearance] : normal appearance [Well Groomed] : well groomed [General Appearance - Well Nourished] : well nourished [No Deformities] : no deformities [General Appearance - In No Acute Distress] : no acute distress [Normal Conjunctiva] : the conjunctiva exhibited no abnormalities [Normal Oral Mucosa] : normal oral mucosa [Eyelids - No Xanthelasma] : the eyelids demonstrated no xanthelasmas [No Oral Cyanosis] : no oral cyanosis [Normal Jugular Venous A Waves Present] : normal jugular venous A waves present [No Oral Pallor] : no oral pallor [No Jugular Venous Lang A Waves] : no jugular venous lang A waves [Normal Jugular Venous V Waves Present] : normal jugular venous V waves present [Auscultation Breath Sounds / Voice Sounds] : lungs were clear to auscultation bilaterally [Respiration, Rhythm And Depth] : normal respiratory rhythm and effort [Exaggerated Use Of Accessory Muscles For Inspiration] : no accessory muscle use [Heart Rate And Rhythm] : heart rate and rhythm were normal [Heart Sounds] : normal S1 and S2 [Abdomen Tenderness] : non-tender [FreeTextEntry1] : 3/6 SHAMA apex [Abdomen Soft] : soft [Nail Clubbing] : no clubbing of the fingernails [Abdomen Mass (___ Cm)] : no abdominal mass palpated [Petechial Hemorrhages (___cm)] : no petechial hemorrhages [Cyanosis, Localized] : no localized cyanosis [Skin Color & Pigmentation] : normal skin color and pigmentation [] : no rash [No Venous Stasis] : no venous stasis [Skin Lesions] : no skin lesions [No Skin Ulcers] : no skin ulcer [No Xanthoma] : no  xanthoma was observed [Oriented To Time, Place, And Person] : oriented to person, place, and time [No Anxiety] : not feeling anxious [Mood] : the mood was normal [Affect] : the affect was normal

## 2019-06-17 NOTE — HISTORY OF PRESENT ILLNESS
[FreeTextEntry1] : 1. HTN: on medications, no SE noted. \par 2. Hyperlipidemia: on statin. Lipid profile is followed by PMD.\par 3. CAD s/p PCI: He is compliant with medications but still smokes. The patient last had PCI in 5/2018. He was recently admitted to [facility] for ENT surgery for CA. He has undergone chemo treatment. He denies CP or SOB and has good ET. He is on ASA and not on plavix.\par 4. CHF: He has LVEF 35%. No leg edema.

## 2019-06-17 NOTE — REASON FOR VISIT
[Follow-Up - Clinic] : a clinic follow-up of [Coronary Artery Disease] : coronary artery disease [Abnormal ECG] : an abnormal ECG [Hyperlipidemia] : hyperlipidemia [Heart Failure] : congestive heart failure [Hypertension] : hypertension [FreeTextEntry1] : 69 M HTN hyperlipidemia CAD s/p PCI CHF EF 35% MR for f/u. \par \par

## 2019-06-17 NOTE — DISCUSSION/SUMMARY
[FreeTextEntry1] : 69 M HTN hyperlipidemia CAD s/p PCI CHF LVEF 35% for f/u.\par 1. Continue meds for HTN.\par 2. Continue statin.\par 3. ASA for CAD.\par 4. Monitor for symptoms.

## 2019-07-22 ENCOUNTER — NON-APPOINTMENT (OUTPATIENT)
Age: 69
End: 2019-07-22

## 2019-07-22 ENCOUNTER — APPOINTMENT (OUTPATIENT)
Dept: CARDIOLOGY | Facility: CLINIC | Age: 69
End: 2019-07-22
Payer: MEDICARE

## 2019-07-22 VITALS
BODY MASS INDEX: 29.7 KG/M2 | HEART RATE: 55 BPM | RESPIRATION RATE: 17 BRPM | SYSTOLIC BLOOD PRESSURE: 125 MMHG | WEIGHT: 184 LBS | TEMPERATURE: 98.6 F | DIASTOLIC BLOOD PRESSURE: 76 MMHG | OXYGEN SATURATION: 95 %

## 2019-07-22 PROCEDURE — 99214 OFFICE O/P EST MOD 30 MIN: CPT

## 2019-07-22 PROCEDURE — 93000 ELECTROCARDIOGRAM COMPLETE: CPT

## 2019-07-22 NOTE — PHYSICAL EXAM
[General Appearance - Well Developed] : well developed [Normal Appearance] : normal appearance [Well Groomed] : well groomed [General Appearance - Well Nourished] : well nourished [No Deformities] : no deformities [General Appearance - In No Acute Distress] : no acute distress [Normal Conjunctiva] : the conjunctiva exhibited no abnormalities [Eyelids - No Xanthelasma] : the eyelids demonstrated no xanthelasmas [Normal Oral Mucosa] : normal oral mucosa [No Oral Pallor] : no oral pallor [No Oral Cyanosis] : no oral cyanosis [Normal Jugular Venous A Waves Present] : normal jugular venous A waves present [Normal Jugular Venous V Waves Present] : normal jugular venous V waves present [No Jugular Venous Lang A Waves] : no jugular venous lang A waves [Respiration, Rhythm And Depth] : normal respiratory rhythm and effort [Exaggerated Use Of Accessory Muscles For Inspiration] : no accessory muscle use [Heart Rate And Rhythm] : heart rate and rhythm were normal [Auscultation Breath Sounds / Voice Sounds] : lungs were clear to auscultation bilaterally [Heart Sounds] : normal S1 and S2 [FreeTextEntry1] : 3/6 SHAMA apex [Abdomen Soft] : soft [Abdomen Tenderness] : non-tender [Abdomen Mass (___ Cm)] : no abdominal mass palpated [Nail Clubbing] : no clubbing of the fingernails [Cyanosis, Localized] : no localized cyanosis [Petechial Hemorrhages (___cm)] : no petechial hemorrhages [Skin Color & Pigmentation] : normal skin color and pigmentation [] : no rash [No Venous Stasis] : no venous stasis [Skin Lesions] : no skin lesions [No Skin Ulcers] : no skin ulcer [No Xanthoma] : no  xanthoma was observed [Oriented To Time, Place, And Person] : oriented to person, place, and time [Affect] : the affect was normal [Mood] : the mood was normal [No Anxiety] : not feeling anxious

## 2019-07-22 NOTE — DISCUSSION/SUMMARY
[FreeTextEntry1] : 69 M HTN hyperlipidemia CAD s/p PCI CHF LVEF 35% for f/u.\par 1. Continue meds for HTN.\par 2. Continue statin.\par 3. ASA for CAD (patient is off plavix).\par 4. Continue BB and ACEI for chronic systolic HF.

## 2019-07-22 NOTE — REASON FOR VISIT
[Follow-Up - Clinic] : a clinic follow-up of [Coronary Artery Disease] : coronary artery disease [Hyperlipidemia] : hyperlipidemia [Heart Failure] : congestive heart failure [Hypertension] : hypertension [FreeTextEntry1] : 69 M HTN hyperlipidemia CAD s/p PCI CHF EF 35% MR for f/u. \par

## 2019-07-22 NOTE — HISTORY OF PRESENT ILLNESS
[FreeTextEntry1] : 1. HTN: on medications, BP controlled. \par 2. Hyperlipidemia: on statin. No SE noted. \par 3. CAD s/p PCI: He is compliant with medications but still smokes. The patient last had PCI in 5/2018. He was recently admitted to [facility] for ENT surgery for CA. He has undergone chemo treatment. He denies CP or SOB and has good ET. He is on ASA and not on plavix. His symptoms have improved. ET is stable.\par 4. CHF: He has LVEF 35%. No leg edema. No CP or SOB noted.\par

## 2019-09-23 ENCOUNTER — APPOINTMENT (OUTPATIENT)
Dept: CARDIOLOGY | Facility: CLINIC | Age: 69
End: 2019-09-23
Payer: MEDICARE

## 2019-09-23 VITALS
DIASTOLIC BLOOD PRESSURE: 63 MMHG | SYSTOLIC BLOOD PRESSURE: 103 MMHG | RESPIRATION RATE: 17 BRPM | HEART RATE: 50 BPM | OXYGEN SATURATION: 95 % | WEIGHT: 182 LBS | TEMPERATURE: 98.9 F | BODY MASS INDEX: 29.38 KG/M2

## 2019-09-23 PROCEDURE — 93000 ELECTROCARDIOGRAM COMPLETE: CPT

## 2019-09-23 PROCEDURE — 99214 OFFICE O/P EST MOD 30 MIN: CPT

## 2019-09-23 NOTE — REASON FOR VISIT
[Coronary Artery Disease] : coronary artery disease [Follow-Up - Clinic] : a clinic follow-up of [Heart Failure] : congestive heart failure [Hyperlipidemia] : hyperlipidemia [Hypertension] : hypertension [FreeTextEntry1] : 69 M HTN hyperlipidemia CAD s/p PCI CHF EF 35% MR for f/u. \par

## 2019-09-23 NOTE — PHYSICAL EXAM
[General Appearance - Well Developed] : well developed [Normal Appearance] : normal appearance [Well Groomed] : well groomed [General Appearance - Well Nourished] : well nourished [No Deformities] : no deformities [General Appearance - In No Acute Distress] : no acute distress [Normal Conjunctiva] : the conjunctiva exhibited no abnormalities [Eyelids - No Xanthelasma] : the eyelids demonstrated no xanthelasmas [Normal Oral Mucosa] : normal oral mucosa [No Oral Pallor] : no oral pallor [Normal Jugular Venous A Waves Present] : normal jugular venous A waves present [No Oral Cyanosis] : no oral cyanosis [Normal Jugular Venous V Waves Present] : normal jugular venous V waves present [No Jugular Venous Lang A Waves] : no jugular venous lang A waves [Respiration, Rhythm And Depth] : normal respiratory rhythm and effort [Exaggerated Use Of Accessory Muscles For Inspiration] : no accessory muscle use [Auscultation Breath Sounds / Voice Sounds] : lungs were clear to auscultation bilaterally [Heart Rate And Rhythm] : heart rate and rhythm were normal [Heart Sounds] : normal S1 and S2 [Abdomen Soft] : soft [Abdomen Tenderness] : non-tender [FreeTextEntry1] : 3/6 SHAMA apex [Abdomen Mass (___ Cm)] : no abdominal mass palpated [Nail Clubbing] : no clubbing of the fingernails [Petechial Hemorrhages (___cm)] : no petechial hemorrhages [Cyanosis, Localized] : no localized cyanosis [Skin Color & Pigmentation] : normal skin color and pigmentation [] : no rash [No Venous Stasis] : no venous stasis [Skin Lesions] : no skin lesions [No Skin Ulcers] : no skin ulcer [Oriented To Time, Place, And Person] : oriented to person, place, and time [No Xanthoma] : no  xanthoma was observed [Mood] : the mood was normal [Affect] : the affect was normal [No Anxiety] : not feeling anxious

## 2019-09-23 NOTE — DISCUSSION/SUMMARY
[FreeTextEntry1] : 69 M HTN hyperlipidemia CAD PCI CHF LVEF 35% for f/u.\par Monitor bradycardia.\par Continue meds for HTN.\par Continue statin.\par Continue ASA and plavix for CAD.  Detail Level: Generalized Quality 431: Preventive Care And Screening: Unhealthy Alcohol Use - Screening: Patient screened for unhealthy alcohol use using a single question and scores less than 2 times per year Quality 226: Preventive Care And Screening: Tobacco Use: Screening And Cessation Intervention: Patient screened for tobacco use and is an ex/non-smoker Quality 47: Advance Care Plan: Advance Care Planning discussed and documented in the medical record; patient did not wish or was not able to name a surrogate decision maker or provide an advance care plan.

## 2019-09-23 NOTE — HISTORY OF PRESENT ILLNESS
[FreeTextEntry1] : 1. HTN: on medications, no SE noted. \par 2. Hyperlipidemia: on statin.  \par 3. CAD s/p PCI: He is compliant with medications but still smokes. The patient last had PCI in 5/2018. He was recently admitted to [place] for ENT surgery for CA. He has undergone chemo treatment. He denies CP or SOB and has good ET. He is on ASA and not on plavix. Patient denies CP or SOB. ET is relatively stable.\par 4. CHF: He has LVEF 35%.  \par

## 2019-12-16 ENCOUNTER — APPOINTMENT (OUTPATIENT)
Dept: CARDIOLOGY | Facility: CLINIC | Age: 69
End: 2019-12-16
Payer: MEDICARE

## 2019-12-16 VITALS
TEMPERATURE: 97.7 F | WEIGHT: 180 LBS | DIASTOLIC BLOOD PRESSURE: 80 MMHG | OXYGEN SATURATION: 97 % | HEART RATE: 48 BPM | RESPIRATION RATE: 17 BRPM | BODY MASS INDEX: 29.05 KG/M2 | SYSTOLIC BLOOD PRESSURE: 146 MMHG

## 2019-12-16 PROCEDURE — 93000 ELECTROCARDIOGRAM COMPLETE: CPT

## 2019-12-16 PROCEDURE — 99214 OFFICE O/P EST MOD 30 MIN: CPT

## 2019-12-16 PROCEDURE — 93306 TTE W/DOPPLER COMPLETE: CPT

## 2019-12-16 NOTE — REVIEW OF SYSTEMS
[Shortness Of Breath] : shortness of breath [Palpitations] : no palpitations [Chest Pain] : no chest pain [Negative] : Endocrine

## 2019-12-16 NOTE — DISCUSSION/SUMMARY
[FreeTextEntry1] : 69 M HTN hyperlipidemia DM CAD s/p P[facility] CHF LVEF 35% LBBB with SOB.\par CHECK ECHOCARDIOGRAM.\par Continue medications for HTN.\par COntinue ASA and statin.

## 2019-12-16 NOTE — REASON FOR VISIT
[FreeTextEntry1] : 69 M HTN hyperlipidemia CAD s/p PCI CHF EF 35% MR for f/u. \par Dr. Rafaela Liu\par [phone]

## 2019-12-16 NOTE — PHYSICAL EXAM
[General Appearance - Well Developed] : well developed [Well Groomed] : well groomed [General Appearance - Well Nourished] : well nourished [Normal Appearance] : normal appearance [No Deformities] : no deformities [General Appearance - In No Acute Distress] : no acute distress [Normal Conjunctiva] : the conjunctiva exhibited no abnormalities [Eyelids - No Xanthelasma] : the eyelids demonstrated no xanthelasmas [No Oral Pallor] : no oral pallor [Normal Oral Mucosa] : normal oral mucosa [No Oral Cyanosis] : no oral cyanosis [Normal Jugular Venous V Waves Present] : normal jugular venous V waves present [No Jugular Venous Lang A Waves] : no jugular venous lang A waves [Normal Jugular Venous A Waves Present] : normal jugular venous A waves present [Respiration, Rhythm And Depth] : normal respiratory rhythm and effort [Exaggerated Use Of Accessory Muscles For Inspiration] : no accessory muscle use [Auscultation Breath Sounds / Voice Sounds] : lungs were clear to auscultation bilaterally [Heart Rate And Rhythm] : heart rate and rhythm were normal [FreeTextEntry1] : 3/6 SHAMA apex [Abdomen Soft] : soft [Heart Sounds] : normal S1 and S2 [Abdomen Tenderness] : non-tender [Nail Clubbing] : no clubbing of the fingernails [Cyanosis, Localized] : no localized cyanosis [Abdomen Mass (___ Cm)] : no abdominal mass palpated [Skin Color & Pigmentation] : normal skin color and pigmentation [Petechial Hemorrhages (___cm)] : no petechial hemorrhages [] : no rash [No Venous Stasis] : no venous stasis [Skin Lesions] : no skin lesions [No Skin Ulcers] : no skin ulcer [No Xanthoma] : no  xanthoma was observed [Mood] : the mood was normal [Oriented To Time, Place, And Person] : oriented to person, place, and time [Affect] : the affect was normal [No Anxiety] : not feeling anxious

## 2019-12-16 NOTE — HISTORY OF PRESENT ILLNESS
[FreeTextEntry1] : 1. HTN: on medications, compliant with medications.\par 2. Hyperlipidemia: on statin.  Controlled. \par 3. CAD s/p PCI: He is compliant with medications but still smokes. The patient last had PCI in 5/2018. He was recently admitted to [place] for ENT surgery for CA. He has undergone chemo treatment. He had mild SOB with exertion over the last month with leg edema. ET is slightly reduced.\par 4. CHF: He has LVEF 35%. He also has LBBB on EKG.\par

## 2020-01-27 ENCOUNTER — APPOINTMENT (OUTPATIENT)
Dept: CARDIOLOGY | Facility: CLINIC | Age: 70
End: 2020-01-27
Payer: MEDICARE

## 2020-01-27 ENCOUNTER — NON-APPOINTMENT (OUTPATIENT)
Age: 70
End: 2020-01-27

## 2020-01-27 VITALS
WEIGHT: 184 LBS | TEMPERATURE: 97.6 F | HEART RATE: 63 BPM | BODY MASS INDEX: 29.7 KG/M2 | SYSTOLIC BLOOD PRESSURE: 136 MMHG | RESPIRATION RATE: 17 BRPM | OXYGEN SATURATION: 96 % | DIASTOLIC BLOOD PRESSURE: 80 MMHG

## 2020-01-27 PROCEDURE — 99214 OFFICE O/P EST MOD 30 MIN: CPT

## 2020-01-27 PROCEDURE — 93000 ELECTROCARDIOGRAM COMPLETE: CPT

## 2020-01-27 NOTE — HISTORY OF PRESENT ILLNESS
[FreeTextEntry1] : 1. HTN: on medications, controlled.\par 2. Hyperlipidemia: on statin. LIpid profile is followed by PMD. \par 3. CAD s/p PCI: He is compliant with medications but still smokes. The patient last had PCI in 5/2018. He was recently admitted to [place] for ENT surgery for CA. He has undergone chemo treatment. Patient denies CP or SOB. ET is stable. \par 4. CHF: He has LVEF 35%. He also has LBBB on EKG. Patient is on medical therapy. \par

## 2020-01-27 NOTE — REASON FOR VISIT
[Follow-Up - Clinic] : a clinic follow-up of [Coronary Artery Disease] : coronary artery disease [Heart Failure] : congestive heart failure [Hyperlipidemia] : hyperlipidemia [Hypertension] : hypertension [Mitral Regurgitation] : mitral regurgitation [FreeTextEntry1] : 69 M HTN hyperlipidemia CAD s/p PCI CHF EF 35% MR for f/u. \par Dr. Rafaela Liu\par [phone] \par

## 2020-01-27 NOTE — DISCUSSION/SUMMARY
[FreeTextEntry1] : 69 M HTN hyperlipidemia CHF LVEF 35% CAD PCI with MR.\par Will review TTE, MR appears worse on exam.\par Continue BB ACEI.\par Continue meds for HTN.\par Continue statin.

## 2020-06-09 ENCOUNTER — APPOINTMENT (OUTPATIENT)
Dept: CARDIOLOGY | Facility: CLINIC | Age: 70
End: 2020-06-09
Payer: MEDICARE

## 2020-06-09 PROCEDURE — 99442: CPT | Mod: 95

## 2020-06-09 NOTE — HISTORY OF PRESENT ILLNESS
[Home] : at home, [unfilled] , at the time of the visit. [Medical Office: ([facility])___] : at the medical office located in  [Verbal consent obtained from patient] : the patient, [unfilled] [FreeTextEntry1] : 69 M HTN hyperlipidemia CAD s/p PCI CHF EF 35% MR for f/u. \par Dr. Rafaela Liu\par [phone] \par \par   \par 1. HTN: on medications, controlled. No SE noted.\par 2. Hyperlipidemia: on statin. No muscle pain is noted. \par 3. CAD s/p PCI: He is compliant with medications but still smokes. The patient last had PCI in 2018. He was recently admitted to [place] for ENT surgery for CA. He has undergone chemo treatment.  \par 4. CHF: He has LVEF 35%. He also has LBBB on EKG. Patient is on medical therapy. \par Patient denies CP or SOB. No cough or fevers noted. \par \par EK20 LBBB \par Echo:  moderate MR LVEF 35% \par  [Time Spent: ___ minutes] : I have spent [unfilled] minutes with the patient on the telephone

## 2020-06-09 NOTE — PLAN
[FreeTextEntry1] : 1. Continue medications for HTN.\par 2. Continue statin.\par 3. Monitor symptoms for now.

## 2020-08-14 ENCOUNTER — APPOINTMENT (OUTPATIENT)
Dept: HEART AND VASCULAR | Facility: CLINIC | Age: 70
End: 2020-08-14

## 2020-08-17 ENCOUNTER — APPOINTMENT (OUTPATIENT)
Dept: CARDIOLOGY | Facility: CLINIC | Age: 70
End: 2020-08-17
Payer: MEDICARE

## 2020-08-17 VITALS
TEMPERATURE: 98 F | OXYGEN SATURATION: 97 % | RESPIRATION RATE: 17 BRPM | SYSTOLIC BLOOD PRESSURE: 123 MMHG | WEIGHT: 191 LBS | HEART RATE: 54 BPM | DIASTOLIC BLOOD PRESSURE: 74 MMHG | BODY MASS INDEX: 30.83 KG/M2

## 2020-08-17 PROCEDURE — 93000 ELECTROCARDIOGRAM COMPLETE: CPT

## 2020-08-17 PROCEDURE — 99214 OFFICE O/P EST MOD 30 MIN: CPT

## 2020-08-17 NOTE — DISCUSSION/SUMMARY
[FreeTextEntry1] : 70 M HTN hyperlipidemia CAD s/p PCI CHF LVEF 35 % LBBB for f/u.\par Continue medications for HTN.\par Continue statin.\par Continue ASA for CAD. Symptom monitoring and social distancing.

## 2020-08-17 NOTE — PHYSICAL EXAM
[Normal Appearance] : normal appearance [General Appearance - Well Developed] : well developed [Well Groomed] : well groomed [No Deformities] : no deformities [General Appearance - In No Acute Distress] : no acute distress [General Appearance - Well Nourished] : well nourished [Normal Conjunctiva] : the conjunctiva exhibited no abnormalities [Eyelids - No Xanthelasma] : the eyelids demonstrated no xanthelasmas [No Oral Pallor] : no oral pallor [Normal Oral Mucosa] : normal oral mucosa [Normal Jugular Venous A Waves Present] : normal jugular venous A waves present [No Oral Cyanosis] : no oral cyanosis [Normal Jugular Venous V Waves Present] : normal jugular venous V waves present [No Jugular Venous Lang A Waves] : no jugular venous lang A waves [Respiration, Rhythm And Depth] : normal respiratory rhythm and effort [Exaggerated Use Of Accessory Muscles For Inspiration] : no accessory muscle use [Heart Sounds] : normal S1 and S2 [Heart Rate And Rhythm] : heart rate and rhythm were normal [Auscultation Breath Sounds / Voice Sounds] : lungs were clear to auscultation bilaterally [FreeTextEntry1] : 3/6 SHAMA apex [Abdomen Tenderness] : non-tender [Abdomen Soft] : soft [Nail Clubbing] : no clubbing of the fingernails [Abdomen Mass (___ Cm)] : no abdominal mass palpated [Cyanosis, Localized] : no localized cyanosis [Petechial Hemorrhages (___cm)] : no petechial hemorrhages [No Venous Stasis] : no venous stasis [Skin Color & Pigmentation] : normal skin color and pigmentation [] : no rash [No Skin Ulcers] : no skin ulcer [No Xanthoma] : no  xanthoma was observed [Skin Lesions] : no skin lesions [Affect] : the affect was normal [Oriented To Time, Place, And Person] : oriented to person, place, and time [Mood] : the mood was normal [No Anxiety] : not feeling anxious

## 2020-08-17 NOTE — HISTORY OF PRESENT ILLNESS
[FreeTextEntry1] :  \par 1. HTN: on medications, no SE noted. \par 2. Hyperlipidemia: on statin. No muscle pain is noted. \par 3. CAD s/p PCI: He is compliant with medications but still smokes. The patient last had PCI in 2018. He was recently admitted to [place] for ENT surgery for CA. He has undergone chemo treatment.  \par 4. CHF: He has LVEF 35%. He also has LBBB on EKG. Patient is on medical therapy.\par Patient denies CP or SOB. ET is stable. No cough or fevers noted.  \par \par  \par Cardiology Summary\par \par EK20 LBBB \par \par

## 2020-08-17 NOTE — REASON FOR VISIT
[Follow-Up - Clinic] : a clinic follow-up of [Heart Failure] : congestive heart failure [Coronary Artery Disease] : coronary artery disease [Hyperlipidemia] : hyperlipidemia [FreeTextEntry1] : 70  M HTN hyperlipidemia CAD s/p PCI CHF EF 35% MR for f/u. \par Dr. Rafaela Liu\par [phone] \par \par  [Hypertension] : hypertension

## 2020-10-19 ENCOUNTER — APPOINTMENT (OUTPATIENT)
Dept: CARDIOLOGY | Facility: CLINIC | Age: 70
End: 2020-10-19

## 2020-11-09 ENCOUNTER — APPOINTMENT (OUTPATIENT)
Dept: CARDIOLOGY | Facility: CLINIC | Age: 70
End: 2020-11-09
Payer: MEDICARE

## 2020-11-09 VITALS
OXYGEN SATURATION: 96 % | HEART RATE: 54 BPM | RESPIRATION RATE: 17 BRPM | BODY MASS INDEX: 31.15 KG/M2 | SYSTOLIC BLOOD PRESSURE: 139 MMHG | DIASTOLIC BLOOD PRESSURE: 77 MMHG | TEMPERATURE: 97.7 F | WEIGHT: 193 LBS

## 2020-11-09 PROCEDURE — 99214 OFFICE O/P EST MOD 30 MIN: CPT

## 2020-11-09 PROCEDURE — 93000 ELECTROCARDIOGRAM COMPLETE: CPT

## 2020-11-09 NOTE — PHYSICAL EXAM
[General Appearance - Well Developed] : well developed [Normal Appearance] : normal appearance [Well Groomed] : well groomed [General Appearance - Well Nourished] : well nourished [No Deformities] : no deformities [General Appearance - In No Acute Distress] : no acute distress [Normal Conjunctiva] : the conjunctiva exhibited no abnormalities [Eyelids - No Xanthelasma] : the eyelids demonstrated no xanthelasmas [Normal Oral Mucosa] : normal oral mucosa [No Oral Pallor] : no oral pallor [No Oral Cyanosis] : no oral cyanosis [Normal Jugular Venous A Waves Present] : normal jugular venous A waves present [Normal Jugular Venous V Waves Present] : normal jugular venous V waves present [No Jugular Venous Lang A Waves] : no jugular venous lang A waves [Respiration, Rhythm And Depth] : normal respiratory rhythm and effort [Exaggerated Use Of Accessory Muscles For Inspiration] : no accessory muscle use [Auscultation Breath Sounds / Voice Sounds] : lungs were clear to auscultation bilaterally [Heart Rate And Rhythm] : heart rate and rhythm were normal [Heart Sounds] : normal S1 and S2 [Abdomen Soft] : soft [Abdomen Tenderness] : non-tender [Abdomen Mass (___ Cm)] : no abdominal mass palpated [Nail Clubbing] : no clubbing of the fingernails [Cyanosis, Localized] : no localized cyanosis [Petechial Hemorrhages (___cm)] : no petechial hemorrhages [Skin Color & Pigmentation] : normal skin color and pigmentation [] : no rash [No Venous Stasis] : no venous stasis [Skin Lesions] : no skin lesions [No Skin Ulcers] : no skin ulcer [No Xanthoma] : no  xanthoma was observed [Oriented To Time, Place, And Person] : oriented to person, place, and time [Affect] : the affect was normal [Mood] : the mood was normal [No Anxiety] : not feeling anxious [FreeTextEntry1] : 3/6 SHAMA apex

## 2020-11-09 NOTE — HISTORY OF PRESENT ILLNESS
[FreeTextEntry1] :  \par 1. HTN: on medications, compliant with medications.\par 2. Hyperlipidemia: on statin. No SE noted.\par 3. CAD s/p PCI: He is compliant with medications but still smokes. The patient last had PCI in 5/2018. He was recently admitted to [place] for ENT surgery for CA. He has undergone chemo treatment. \par 4. CHF: He has LVEF 35%. He also has LBBB on EKG. Patient is on medical therapy.\par \par Patient denies CP or SOB. No cough or fevers noted. He is compliant with medications. ET is stable.\par

## 2020-11-09 NOTE — DISCUSSION/SUMMARY
[FreeTextEntry1] : 70 M HTN hyperlipidemia DM CAD s/p PCI CHF for f/u.\par Continue medications for HTN.\par Continue statin.\par ASA for CAD (plavix is stopped).\par Continue symptom monitoring and social distancing.

## 2020-11-09 NOTE — REASON FOR VISIT
[Follow-Up - Clinic] : a clinic follow-up of [Coronary Artery Disease] : coronary artery disease [Hyperlipidemia] : hyperlipidemia [Hypertension] : hypertension [FreeTextEntry1] : 70 M HTN hyperlipidemia CAD s/p PCI CHF EF 35% MR for f/u. \par Dr. Rafaela Liu\par [phone] \par \par

## 2021-01-04 ENCOUNTER — APPOINTMENT (OUTPATIENT)
Dept: CARDIOLOGY | Facility: CLINIC | Age: 71
End: 2021-01-04

## 2021-01-21 ENCOUNTER — APPOINTMENT (OUTPATIENT)
Dept: CARDIOLOGY | Facility: CLINIC | Age: 71
End: 2021-01-21
Payer: MEDICARE

## 2021-01-21 ENCOUNTER — NON-APPOINTMENT (OUTPATIENT)
Age: 71
End: 2021-01-21

## 2021-01-21 VITALS
BODY MASS INDEX: 30.99 KG/M2 | HEART RATE: 62 BPM | RESPIRATION RATE: 17 BRPM | WEIGHT: 192 LBS | SYSTOLIC BLOOD PRESSURE: 151 MMHG | OXYGEN SATURATION: 97 % | TEMPERATURE: 97.5 F | DIASTOLIC BLOOD PRESSURE: 84 MMHG

## 2021-01-21 PROCEDURE — 99214 OFFICE O/P EST MOD 30 MIN: CPT

## 2021-01-21 PROCEDURE — 93000 ELECTROCARDIOGRAM COMPLETE: CPT

## 2021-01-21 NOTE — DISCUSSION/SUMMARY
[FreeTextEntry1] : 70 M HTN hyperlipidemia CAD s/p PCI CHF EF 35% MR for f/u. \par Continue ASA and statin for CAD.\par Continue BB.\par Symptom monitoring and social distancing.

## 2021-01-21 NOTE — REASON FOR VISIT
[Follow-Up - Clinic] : a clinic follow-up of [Coronary Artery Disease] : coronary artery disease [Hyperlipidemia] : hyperlipidemia [Hypertension] : hypertension [FreeTextEntry1] : 70 M HTN hyperlipidemia CAD s/p PCI CHF EF 35% MR for f/u. \par Dr. Rafaela Liu\par [phone] \par \par

## 2021-01-21 NOTE — HISTORY OF PRESENT ILLNESS
[FreeTextEntry1] :  \par 1. HTN: on medications, no SE noted. \par 2. Hyperlipidemia: on statin.  \par 3. CAD s/p PCI: He is compliant with medications but still smokes. The patient last had PCI in 5/2018. He was recently admitted to [place] for ENT surgery for CA. He has undergone chemo treatment. \par 4. CHF: He has LVEF 35%. He also has LBBB on EKG.  \par Patient denies CP or SOB. No cough or fevers noted. ET is stable.\par Patient is compliant with medications.\par ET is stable.

## 2021-04-19 ENCOUNTER — APPOINTMENT (OUTPATIENT)
Dept: CARDIOLOGY | Facility: CLINIC | Age: 71
End: 2021-04-19

## 2021-05-03 ENCOUNTER — NON-APPOINTMENT (OUTPATIENT)
Age: 71
End: 2021-05-03

## 2021-05-03 ENCOUNTER — APPOINTMENT (OUTPATIENT)
Dept: CARDIOLOGY | Facility: CLINIC | Age: 71
End: 2021-05-03
Payer: MEDICARE

## 2021-05-03 VITALS
OXYGEN SATURATION: 96 % | RESPIRATION RATE: 17 BRPM | BODY MASS INDEX: 31.31 KG/M2 | SYSTOLIC BLOOD PRESSURE: 177 MMHG | WEIGHT: 194 LBS | TEMPERATURE: 98.7 F | HEART RATE: 62 BPM | DIASTOLIC BLOOD PRESSURE: 83 MMHG

## 2021-05-03 PROCEDURE — 93000 ELECTROCARDIOGRAM COMPLETE: CPT

## 2021-05-03 PROCEDURE — 99214 OFFICE O/P EST MOD 30 MIN: CPT

## 2021-05-03 NOTE — PHYSICAL EXAM
[Well Developed] : well developed [Well Nourished] : well nourished [No Acute Distress] : no acute distress [Normal Conjunctiva] : normal conjunctiva [Normal Venous Pressure] : normal venous pressure [No Carotid Bruit] : no carotid bruit [Normal S1, S2] : normal S1, S2 [No Rub] : no rub [No Gallop] : no gallop [S3] : S3 [Clear Lung Fields] : clear lung fields [Good Air Entry] : good air entry [No Respiratory Distress] : no respiratory distress  [Soft] : abdomen soft [Non Tender] : non-tender [No Masses/organomegaly] : no masses/organomegaly [Normal Bowel Sounds] : normal bowel sounds [Normal Gait] : normal gait [No Edema] : no edema [No Cyanosis] : no cyanosis [No Clubbing] : no clubbing [No Varicosities] : no varicosities [No Rash] : no rash [No Skin Lesions] : no skin lesions [Moves all extremities] : moves all extremities [No Focal Deficits] : no focal deficits [Normal Speech] : normal speech [Alert and Oriented] : alert and oriented [Normal memory] : normal memory [de-identified] : 3/6 SHAMA apex and RUSB

## 2021-05-03 NOTE — DISCUSSION/SUMMARY
[FreeTextEntry1] : 71 M HTN hyperlipidemia CAD s/p PCI CHF LVEF 35% LBBB with SOB and murmur.\par CHECK ECHOCARDIOGRAM TO ASSESS WORSENING MR.\par Continue medications for HTN.\par Continue statin.\par Continue ASA.

## 2021-05-03 NOTE — HISTORY OF PRESENT ILLNESS
[FreeTextEntry1] :  \par 1. HTN: on medications, controlled.\par 2. Hyperlipidemia: on statin. No muscle pain is noted.\par 3. CAD s/p PCI: He is compliant with medications but still smokes. The patient last had PCI in 5/2018. He had surgery for CA at [place] by ENT. He has undergone chemo treatment. \par 4. CHF: He has LVEF 35%. He also has LBBB on EKG. \par \par The patient has noted intermittent palpitations and SOB over the last few months. His symptoms are exertional and relieved with rest.

## 2021-05-03 NOTE — REASON FOR VISIT
[Cardiac Failure] : cardiac failure [Hyperlipidemia] : hyperlipidemia [Hypertension] : hypertension [Coronary Artery Disease] : coronary artery disease [FreeTextEntry1] : 71 M HTN hyperlipidemia CAD s/p PCI CHF EF 35% MR for f/u. \par Dr. Rafaela Liu\par [phone] \par \par

## 2021-05-03 NOTE — REVIEW OF SYSTEMS
[SOB] : shortness of breath [Dyspnea on exertion] : dyspnea during exertion [Chest Discomfort] : chest discomfort [Negative] : Heme/Lymph

## 2021-07-22 ENCOUNTER — APPOINTMENT (OUTPATIENT)
Dept: CARDIOLOGY | Facility: CLINIC | Age: 71
End: 2021-07-22
Payer: MEDICARE

## 2021-07-22 VITALS
WEIGHT: 190 LBS | HEART RATE: 63 BPM | BODY MASS INDEX: 30.67 KG/M2 | RESPIRATION RATE: 17 BRPM | TEMPERATURE: 98 F | SYSTOLIC BLOOD PRESSURE: 167 MMHG | OXYGEN SATURATION: 97 % | DIASTOLIC BLOOD PRESSURE: 90 MMHG

## 2021-07-22 PROCEDURE — 99214 OFFICE O/P EST MOD 30 MIN: CPT

## 2021-07-22 PROCEDURE — 93306 TTE W/DOPPLER COMPLETE: CPT

## 2021-07-22 PROCEDURE — 93000 ELECTROCARDIOGRAM COMPLETE: CPT

## 2021-07-22 NOTE — DISCUSSION/SUMMARY
[FreeTextEntry1] : 71 M HTN hyperlipidemia CAD s/p PCI CHF LVEF 35% LBBB with SOB.\par CHECK ECHOCARDIOGRAM TO ASSESS WORSENING MR.\par Continue medications for HTN.\par Continue statin.\par Continue ASA.\par Patient received his COVID vaccine.\par EKG for SOB.

## 2021-07-22 NOTE — REASON FOR VISIT
[Structural Heart and Valve Disease] : structural heart and valve disease [Hyperlipidemia] : hyperlipidemia [Hypertension] : hypertension [Coronary Artery Disease] : coronary artery disease [FreeTextEntry1] : 71 M HTN hyperlipidemia CAD s/p PCI CHF EF 35% MR for f/u. \par Dr. Rafaela Liu\par [phone] \par

## 2021-07-22 NOTE — HISTORY OF PRESENT ILLNESS
[FreeTextEntry1] :  \par 1. HTN: on medications, compliant with medications.\par 2. Hyperlipidemia: on statin. No SE noted.\par 3. CAD s/p PCI: He is compliant with medications but still smokes. The patient last had PCI in 5/2018. He had surgery for CA at [place] by ENT. He has undergone chemo treatment. \par 4. CHF: He has LVEF 35%. He also has LBBB on EKG.\par \par The patient has noted intermittent SOB with exertion over the last few weeks.\par He denies CP.\par ET is limited by knee pain.\par He denies PND or orthopnea. \par \par The patient has noted intermittent palpitations and SOB over the last few months. His symptoms are exertional and relieved with rest. \par

## 2021-07-22 NOTE — PHYSICAL EXAM
[Well Developed] : well developed [Well Nourished] : well nourished [No Acute Distress] : no acute distress [Normal Conjunctiva] : normal conjunctiva [Normal Venous Pressure] : normal venous pressure [No Carotid Bruit] : no carotid bruit [Normal S1, S2] : normal S1, S2 [No Rub] : no rub [No Gallop] : no gallop [S3] : S3 [Clear Lung Fields] : clear lung fields [Good Air Entry] : good air entry [No Respiratory Distress] : no respiratory distress  [Soft] : abdomen soft [Non Tender] : non-tender [No Masses/organomegaly] : no masses/organomegaly [Normal Bowel Sounds] : normal bowel sounds [Normal Gait] : normal gait [No Edema] : no edema [No Cyanosis] : no cyanosis [No Clubbing] : no clubbing [No Varicosities] : no varicosities [No Rash] : no rash [No Skin Lesions] : no skin lesions [Moves all extremities] : moves all extremities [No Focal Deficits] : no focal deficits [Normal Speech] : normal speech [Alert and Oriented] : alert and oriented [Normal memory] : normal memory [de-identified] : 3/6 SHAMA apex and RUSB

## 2021-08-02 ENCOUNTER — APPOINTMENT (OUTPATIENT)
Dept: CARDIOLOGY | Facility: CLINIC | Age: 71
End: 2021-08-02
Payer: MEDICARE

## 2021-08-02 VITALS
SYSTOLIC BLOOD PRESSURE: 138 MMHG | BODY MASS INDEX: 30.51 KG/M2 | TEMPERATURE: 97.9 F | OXYGEN SATURATION: 98 % | WEIGHT: 189 LBS | HEART RATE: 57 BPM | DIASTOLIC BLOOD PRESSURE: 86 MMHG | RESPIRATION RATE: 17 BRPM

## 2021-08-02 PROCEDURE — 99214 OFFICE O/P EST MOD 30 MIN: CPT

## 2021-08-02 PROCEDURE — 93000 ELECTROCARDIOGRAM COMPLETE: CPT

## 2021-08-02 NOTE — PHYSICAL EXAM
[Well Developed] : well developed [Well Nourished] : well nourished [No Acute Distress] : no acute distress [Normal Conjunctiva] : normal conjunctiva [Normal Venous Pressure] : normal venous pressure [No Carotid Bruit] : no carotid bruit [Normal S1, S2] : normal S1, S2 [No Rub] : no rub [No Gallop] : no gallop [S3] : S3 [Clear Lung Fields] : clear lung fields [Good Air Entry] : good air entry [No Respiratory Distress] : no respiratory distress  [Soft] : abdomen soft [Non Tender] : non-tender [No Masses/organomegaly] : no masses/organomegaly [Normal Bowel Sounds] : normal bowel sounds [Normal Gait] : normal gait [No Edema] : no edema [No Cyanosis] : no cyanosis [No Clubbing] : no clubbing [No Varicosities] : no varicosities [No Rash] : no rash [No Skin Lesions] : no skin lesions [Moves all extremities] : moves all extremities [No Focal Deficits] : no focal deficits [Normal Speech] : normal speech [Alert and Oriented] : alert and oriented [Normal memory] : normal memory [de-identified] : 3/6 SHAMA apex and RUSB

## 2021-08-02 NOTE — REASON FOR VISIT
[Hyperlipidemia] : hyperlipidemia [Hypertension] : hypertension [Coronary Artery Disease] : coronary artery disease [FreeTextEntry1] : 71 M HTN hyperlipidemia CAD s/p PCI MR for pre-op assessment.\par Dr. Rafaela Liu\par [phone] \par \par

## 2021-08-02 NOTE — HISTORY OF PRESENT ILLNESS
[FreeTextEntry1] :  \par 1. HTN: on medications, no SE noted. \par 2. Hyperlipidemia: on statin. The lipid profile is followed by PMD.\par 3. CAD s/p PCI: He is compliant with medications but still smokes. The patient last had PCI in 5/2018. He had surgery for CA at [place] by ENT. He has undergone chemo treatment. \par 4. CHF: He also has LBBB on EKG.\par \par The patient underwent an echocardiogram that showed EF of 55% with severe MR.\par \par The patient denies any symptoms.\par \par He reports that he is scheduled for R knee surgery. \par \par

## 2021-08-02 NOTE — DISCUSSION/SUMMARY
[FreeTextEntry1] : 71 M HTN hyperlipidemia CAD s/p PCI severe MR LBBB for pre-op assessment.\par Patient is at a moderate to high level of cardiac risk for low risk procedure (knee arthroscopy).\par Continue medications for HTN and hyperlipidemia.

## 2021-09-07 ENCOUNTER — APPOINTMENT (OUTPATIENT)
Dept: DISASTER EMERGENCY | Facility: CLINIC | Age: 71
End: 2021-09-07

## 2021-09-07 VITALS — HEIGHT: 66 IN | WEIGHT: 190.04 LBS | SYSTOLIC BLOOD PRESSURE: 135 MMHG | DIASTOLIC BLOOD PRESSURE: 74 MMHG

## 2021-09-07 DIAGNOSIS — Z01.818 ENCOUNTER FOR OTHER PREPROCEDURAL EXAMINATION: ICD-10-CM

## 2021-09-07 RX ORDER — CHLORHEXIDINE GLUCONATE 213 G/1000ML
1 SOLUTION TOPICAL ONCE
Refills: 0 | Status: DISCONTINUED | OUTPATIENT
Start: 2021-09-10 | End: 2021-09-10

## 2021-09-07 NOTE — H&P ADULT - ASSESSMENT
72 y/o Croatian speaking male, former smoker, w/ PMHx of HTN, HLD, DM T2, tonsil cancer s/p surgery + radiation in 2019, CAD (s/p PCI to RPDA 2018) who presented to his cardiologist, Dr. Fabian, for further evaluation of mitral regurgitation. TTE (7/22/21): LVEF 50-55%, LV apex hypokinetic and aneurysmal, severe MR, minimal TR.  In light of patient’s risk factors and need for pre-op clearance for mitral valve replacement, patient now presents to [facility] for diagnostic coronary angiogram.      Consent obtained using [facility]  ID#  -H/H stable, last dose of home aspirin 81 mg QD ____?   -EF 50-55%, severe MR, KVO fluids    Risks & benefits of procedure and alternative therapy have been explained to the patient including but not limited to: allergic reaction, bleeding w/possible need for blood transfusion, infection, renal and vascular compromise, limb damage, arrhythmia, stroke, vessel dissection/perforation, Myocardial infarction, emergent CABG. Informed consent obtained and in chart.    70 y/o Romanian speaking male, former smoker, w/ PMHx of HTN, HLD, DM T2, tonsil cancer s/p surgery + radiation in 2019, CAD (s/p PCI to RPDA 2018) who presented to his cardiologist, Dr. Fabian, for further evaluation of mitral regurgitation. TTE (7/22/21): LVEF 50-55%, LV apex hypokinetic and aneurysmal, severe MR, minimal TR.  In light of patient’s risk factors and need for pre-op clearance for mitral valve replacement, patient now presents to [facility] for diagnostic coronary angiogram.      Consent obtained using [employer]  Romanian ID# [id number]  -H/H stable, last dose of home aspirin 81 mg QD 9/9, loaded with aspirin 81 mg x1. No plavix load as pt undergoing pre-op for severe MR.   -EF 50-55%, severe MR, KVO fluids    Risks & benefits of procedure and alternative therapy have been explained to the patient including but not limited to: allergic reaction, bleeding w/possible need for blood transfusion, infection, renal and vascular compromise, limb damage, arrhythmia, stroke, vessel dissection/perforation, Myocardial infarction, emergent CABG. Informed consent obtained and in chart.    70 y/o Yoruba speaking male, former smoker, w/ PMHx of HTN, HLD, DM T2, tonsil cancer s/p surgery + radiation in 2019, CAD (s/p PCI to RPDA 2018) who presented to his cardiologist, Dr. Fabian, for further evaluation of mitral regurgitation. TTE (7/22/21): LVEF 50-55%, LV apex hypokinetic and aneurysmal, severe MR, minimal TR.  In light of patient’s risk factors and need for pre-op clearance for mitral valve replacement, patient now presents to [facility] for diagnostic coronary angiogram.      Consent obtained using [employer] Yoruba ID# [id number]  -H/H stable, last dose of home aspirin 81 mg QD 9/9, loaded with aspirin 81 mg x1. No plavix load as pt undergoing pre-op for severe MR.   -EF 50-55%, severe MR, KVO fluids    Risks & benefits of procedure and alternative therapy have been explained to the patient including but not limited to: allergic reaction, bleeding w/possible need for blood transfusion, infection, renal and vascular compromise, limb damage, arrhythmia, stroke, vessel dissection/perforation, Myocardial infarction, emergent CABG. Informed consent obtained and in chart.

## 2021-09-07 NOTE — H&P ADULT - ADDITIONAL PE
ASA:  Mallampati:  EKG: ASA: III  Mallampati: III  EKG: ASA: III  Mallampati: III  EKG: SB w/ LBBB @ 50 bpm, TWI V5-V6, St depression AVL, I

## 2021-09-07 NOTE — H&P ADULT - NSICDXFAMILYHX_GEN_ALL_CORE_FT
FAMILY HISTORY:  Mother  Still living? Unknown  Family history of cerebrovascular accident (CVA), Age at diagnosis: 71-80    Sibling  Still living? No  Family history of acute myocardial infarction, Age at diagnosis: 71-80  Family history of cerebrovascular accident (CVA), Age at diagnosis: 71-80  Family history of stroke, Age at diagnosis: Age Unknown

## 2021-09-07 NOTE — H&P ADULT - NSICDXPASTMEDICALHX_GEN_ALL_CORE_FT
PAST MEDICAL HISTORY:  Atherosclerosis of coronary artery CAD (coronary artery disease)    Benign prostatic hypertrophy     DM (diabetes mellitus)     Essential hypertension Hypertension    HLD (hyperlipidemia)     HTN (hypertension)     Hyperlipidemia Hyperlipidemia    Type 2 diabetes mellitus Diabetes

## 2021-09-07 NOTE — H&P ADULT - ATTENDING COMMENTS
Please see PA note for full details.  I have reviewed the case, examined the patient and agree with plan.  HTN DM hyperlipidemia CAD s/p PCI with severe MR.  Cath shows patent stents with distal LAD .  Patient to be evaluated for MV repair with CTS.

## 2021-09-07 NOTE — H&P ADULT - HISTORY OF PRESENT ILLNESS
***SKELETON***    CARDIOLOGIST: Dr. Fabian   COVID: ___   PHARMACY: ____   ESCORT: ____     Pt is 72yo, active smoker, Male w/ PMHx of HTN, HLD, DM T2, CAD (s/p PCI to RPDA 2018) who presented to his cardiologist, Dr. Fabian, for further evaluation of mitral valve. Patient will TTE showing severe MR. Pt planned to undergo coronary angiogram as part pre-op clearance for mitral valve replacement (_______). Patient denies symptoms at this time. Denies CP, SOB, palpitations, dizziness/syncope, abdominal pain, N/V, fever/chillls, LE edema, orthopnea, PND.      TTE (7/22/21): LVEF 50-55%, LV apex hypokinetic and aneurysmal, severe MR, minimal TR. Cardiac Cath (5/15/2018): HUGO to RPDA; LM normal, patent pLAD stent, old  of dLAD w/ bridging collaterals, LCx luminal, RCA luminal.      In light of patient’s risk factors and need for pre-op clearance for mitral valve replacement, patient now presents to [facility] for diagnostic coronary angiogram.     ***SKELETON***    CARDIOLOGIST: Dr. Fabian   COVID: negative 9/7/21 HIE  PHARMACY: ____   ESCORT: ____     Pt is 70yo, active smoker, Male w/ PMHx of HTN, HLD, DM T2, CAD (s/p PCI to RPDA 2018) who presented to his cardiologist, Dr. Fabian, for further evaluation of mitral valve. Patient will TTE showing severe MR. Pt planned to undergo coronary angiogram as part pre-op clearance for mitral valve replacement (_______). Patient denies symptoms at this time. Denies CP, SOB, palpitations, dizziness/syncope, abdominal pain, N/V, fever/chillls, LE edema, orthopnea, PND.      TTE (7/22/21): LVEF 50-55%, LV apex hypokinetic and aneurysmal, severe MR, minimal TR. Cardiac Cath (5/15/2018): HUGO to RPDA; LM normal, patent pLAD stent, old  of dLAD w/ bridging collaterals, LCx luminal, RCA luminal.      In light of patient’s risk factors and need for pre-op clearance for mitral valve replacement, patient now presents to [facility] for diagnostic coronary angiogram.   History obtained from wife with help of [employer] [place]  ID [id number].  CARDIOLOGIST: Dr. Fabian   COVID: negative 9/7/21 [facility]  PHARMACY: [facility]  ESCORT: wife, needs transportation    72 y/o Kyrgyz speaking male, former smoker, w/ PMHx of HTN, HLD, DM T2, tonsil cancer s/p surgery + radiation in 2019, CAD (s/p PCI to RPDA 2018) who presented to his cardiologist, Dr. Fabian, for further evaluation of mitral regurgitation. TTE (7/22/21): LVEF 50-55%, LV apex hypokinetic and aneurysmal, severe MR, minimal TR. Denies CP, SOB, palpitations, dizziness/syncope, abdominal pain, N/V, fever/chillls, LE edema, orthopnea, PND, exposure to COVID positive people, cough, loss of taste/smell.    In light of patient’s risk factors and need for pre-op clearance for mitral valve replacement, patient now presents to [facility] for diagnostic coronary angiogram.      Cardiac Cath History:  Cardiac Cath (5/15/2018): HUGO to RPDA; LM normal, patent pLAD stent, old  of dLAD w/ bridging collaterals, LCx luminal, RCA luminal.

## 2021-09-08 LAB — SARS-COV-2 N GENE NPH QL NAA+PROBE: NOT DETECTED

## 2021-09-09 ENCOUNTER — FORM ENCOUNTER (OUTPATIENT)
Age: 71
End: 2021-09-09

## 2021-09-10 ENCOUNTER — OUTPATIENT (OUTPATIENT)
Dept: OUTPATIENT SERVICES | Facility: HOSPITAL | Age: 71
LOS: 1 days | Discharge: HOME | End: 2021-09-10
Payer: MEDICARE

## 2021-09-10 LAB
A1C WITH ESTIMATED AVERAGE GLUCOSE RESULT: 6.4 % — HIGH (ref 4–5.6)
ALBUMIN SERPL ELPH-MCNC: 4.4 G/DL — SIGNIFICANT CHANGE UP (ref 3.3–5)
ALP SERPL-CCNC: 100 U/L — SIGNIFICANT CHANGE UP (ref 40–120)
ALT FLD-CCNC: 22 U/L — SIGNIFICANT CHANGE UP (ref 10–45)
ANION GAP SERPL CALC-SCNC: 9 MMOL/L — SIGNIFICANT CHANGE UP (ref 5–17)
APTT BLD: 29.8 SEC — SIGNIFICANT CHANGE UP (ref 27.5–35.5)
AST SERPL-CCNC: 19 U/L — SIGNIFICANT CHANGE UP (ref 10–40)
BASOPHILS # BLD AUTO: 0.05 K/UL — SIGNIFICANT CHANGE UP (ref 0–0.2)
BASOPHILS NFR BLD AUTO: 0.9 % — SIGNIFICANT CHANGE UP (ref 0–2)
BILIRUB SERPL-MCNC: 0.7 MG/DL — SIGNIFICANT CHANGE UP (ref 0.2–1.2)
BUN SERPL-MCNC: 13 MG/DL — SIGNIFICANT CHANGE UP (ref 7–23)
CALCIUM SERPL-MCNC: 10.1 MG/DL — SIGNIFICANT CHANGE UP (ref 8.4–10.5)
CHLORIDE SERPL-SCNC: 104 MMOL/L — SIGNIFICANT CHANGE UP (ref 96–108)
CHOLEST SERPL-MCNC: 182 MG/DL — SIGNIFICANT CHANGE UP
CK MB CFR SERPL CALC: 1.8 NG/ML — SIGNIFICANT CHANGE UP (ref 0–6.7)
CK SERPL-CCNC: 63 U/L — SIGNIFICANT CHANGE UP (ref 30–200)
CO2 SERPL-SCNC: 28 MMOL/L — SIGNIFICANT CHANGE UP (ref 22–31)
CREAT SERPL-MCNC: 1.17 MG/DL — SIGNIFICANT CHANGE UP (ref 0.5–1.3)
EOSINOPHIL # BLD AUTO: 0.21 K/UL — SIGNIFICANT CHANGE UP (ref 0–0.5)
EOSINOPHIL NFR BLD AUTO: 3.6 % — SIGNIFICANT CHANGE UP (ref 0–6)
ESTIMATED AVERAGE GLUCOSE: 137 MG/DL — HIGH (ref 68–114)
GLUCOSE BLDC GLUCOMTR-MCNC: 111 MG/DL — HIGH (ref 70–99)
GLUCOSE SERPL-MCNC: 135 MG/DL — HIGH (ref 70–99)
HCT VFR BLD CALC: 43.8 % — SIGNIFICANT CHANGE UP (ref 39–50)
HDLC SERPL-MCNC: 50 MG/DL — SIGNIFICANT CHANGE UP
HGB BLD-MCNC: 14.4 G/DL — SIGNIFICANT CHANGE UP (ref 13–17)
IMM GRANULOCYTES NFR BLD AUTO: 0.3 % — SIGNIFICANT CHANGE UP (ref 0–1.5)
INR BLD: 1.07 — SIGNIFICANT CHANGE UP (ref 0.88–1.16)
LIPID PNL WITH DIRECT LDL SERPL: 97 MG/DL — SIGNIFICANT CHANGE UP
LYMPHOCYTES # BLD AUTO: 1.35 K/UL — SIGNIFICANT CHANGE UP (ref 1–3.3)
LYMPHOCYTES # BLD AUTO: 23.3 % — SIGNIFICANT CHANGE UP (ref 13–44)
MCHC RBC-ENTMCNC: 29 PG — SIGNIFICANT CHANGE UP (ref 27–34)
MCHC RBC-ENTMCNC: 32.9 GM/DL — SIGNIFICANT CHANGE UP (ref 32–36)
MCV RBC AUTO: 88.1 FL — SIGNIFICANT CHANGE UP (ref 80–100)
MONOCYTES # BLD AUTO: 0.56 K/UL — SIGNIFICANT CHANGE UP (ref 0–0.9)
MONOCYTES NFR BLD AUTO: 9.7 % — SIGNIFICANT CHANGE UP (ref 2–14)
NEUTROPHILS # BLD AUTO: 3.61 K/UL — SIGNIFICANT CHANGE UP (ref 1.8–7.4)
NEUTROPHILS NFR BLD AUTO: 62.2 % — SIGNIFICANT CHANGE UP (ref 43–77)
NON HDL CHOLESTEROL: 132 MG/DL — HIGH
NRBC # BLD: 0 /100 WBCS — SIGNIFICANT CHANGE UP (ref 0–0)
PLATELET # BLD AUTO: 158 K/UL — SIGNIFICANT CHANGE UP (ref 150–400)
POTASSIUM SERPL-MCNC: 4.7 MMOL/L — SIGNIFICANT CHANGE UP (ref 3.5–5.3)
POTASSIUM SERPL-SCNC: 4.7 MMOL/L — SIGNIFICANT CHANGE UP (ref 3.5–5.3)
PROT SERPL-MCNC: 7.4 G/DL — SIGNIFICANT CHANGE UP (ref 6–8.3)
PROTHROM AB SERPL-ACNC: 12.8 SEC — SIGNIFICANT CHANGE UP (ref 10.6–13.6)
RBC # BLD: 4.97 M/UL — SIGNIFICANT CHANGE UP (ref 4.2–5.8)
RBC # FLD: 12.9 % — SIGNIFICANT CHANGE UP (ref 10.3–14.5)
SODIUM SERPL-SCNC: 141 MMOL/L — SIGNIFICANT CHANGE UP (ref 135–145)
TRIGL SERPL-MCNC: 174 MG/DL — HIGH
WBC # BLD: 5.8 K/UL — SIGNIFICANT CHANGE UP (ref 3.8–10.5)
WBC # FLD AUTO: 5.8 K/UL — SIGNIFICANT CHANGE UP (ref 3.8–10.5)

## 2021-09-10 PROCEDURE — 99152 MOD SED SAME PHYS/QHP 5/>YRS: CPT

## 2021-09-10 PROCEDURE — 93010 ELECTROCARDIOGRAM REPORT: CPT | Mod: 59

## 2021-09-10 PROCEDURE — 93312 ECHO TRANSESOPHAGEAL: CPT | Mod: 26

## 2021-09-10 PROCEDURE — 93320 DOPPLER ECHO COMPLETE: CPT | Mod: 26

## 2021-09-10 PROCEDURE — 99219: CPT

## 2021-09-10 PROCEDURE — 76377 3D RENDER W/INTRP POSTPROCES: CPT | Mod: 26

## 2021-09-10 PROCEDURE — 93454 CORONARY ARTERY ANGIO S&I: CPT | Mod: 26

## 2021-09-10 RX ORDER — INSULIN LISPRO 100/ML
VIAL (ML) SUBCUTANEOUS ONCE
Refills: 0 | Status: DISCONTINUED | OUTPATIENT
Start: 2021-09-10 | End: 2021-09-24

## 2021-09-10 RX ORDER — SODIUM CHLORIDE 9 MG/ML
500 INJECTION INTRAMUSCULAR; INTRAVENOUS; SUBCUTANEOUS
Refills: 0 | Status: DISCONTINUED | OUTPATIENT
Start: 2021-09-10 | End: 2021-09-10

## 2021-09-10 RX ORDER — DEXTROSE 50 % IN WATER 50 %
25 SYRINGE (ML) INTRAVENOUS ONCE
Refills: 0 | Status: DISCONTINUED | OUTPATIENT
Start: 2021-09-10 | End: 2021-09-24

## 2021-09-10 RX ORDER — DEXTROSE 50 % IN WATER 50 %
15 SYRINGE (ML) INTRAVENOUS ONCE
Refills: 0 | Status: DISCONTINUED | OUTPATIENT
Start: 2021-09-10 | End: 2021-09-24

## 2021-09-10 RX ORDER — ASPIRIN/CALCIUM CARB/MAGNESIUM 324 MG
81 TABLET ORAL ONCE
Refills: 0 | Status: COMPLETED | OUTPATIENT
Start: 2021-09-10 | End: 2021-09-10

## 2021-09-10 RX ORDER — DEXTROSE 50 % IN WATER 50 %
12.5 SYRINGE (ML) INTRAVENOUS ONCE
Refills: 0 | Status: DISCONTINUED | OUTPATIENT
Start: 2021-09-10 | End: 2021-09-24

## 2021-09-10 RX ORDER — SODIUM CHLORIDE 9 MG/ML
1000 INJECTION, SOLUTION INTRAVENOUS
Refills: 0 | Status: DISCONTINUED | OUTPATIENT
Start: 2021-09-10 | End: 2021-09-24

## 2021-09-10 RX ORDER — GLUCAGON INJECTION, SOLUTION 0.5 MG/.1ML
1 INJECTION, SOLUTION SUBCUTANEOUS ONCE
Refills: 0 | Status: DISCONTINUED | OUTPATIENT
Start: 2021-09-10 | End: 2021-09-24

## 2021-09-10 RX ADMIN — Medication 81 MILLIGRAM(S): at 09:00

## 2021-09-10 RX ADMIN — SODIUM CHLORIDE 30 MILLILITER(S): 9 INJECTION INTRAMUSCULAR; INTRAVENOUS; SUBCUTANEOUS at 09:00

## 2021-09-10 NOTE — PROGRESS NOTE ADULT - SUBJECTIVE AND OBJECTIVE BOX
Interventional Cardiology PA SDA Discharge Note    Patient without complaints.     Afebrile, VSS    Ext:    		  		        Right     Radial :  no  hematoma,  no   bleeding, dressing; C/D/I      Pulses:    intact RAD to baseline     A/P:        70 y/o Latvian speaking male, former smoker, w/ PMHx of HTN, HLD, DM T2, tonsil cancer s/p surgery + radiation in 2019, CAD (s/p PCI to RPDA 2018) who presented to his cardiologist, Dr. Fabian, for further evaluation of mitral regurgitation. TTE (7/22/21): LVEF 50-55%, LV apex hypokinetic and aneurysmal, severe MR, minimal TR. Denies CP, SOB, palpitations, dizziness/syncope, abdominal pain, N/V, fever/chillls, LE edema, orthopnea, PND, exposure to COVID positive people, cough, loss of taste/smell. In light of patient’s risk factors and need for pre-op clearance for mitral valve replacement, patient now presents to [facility] for diagnostic coronary angiogram.      Cardiac Cath History:  Cardiac Cath (5/15/2018): HUGO to RPDA; LM normal, patent pLAD stent, old  of dLAD w/ bridging collaterals, LCx luminal, RCA luminal.      Pt. s/p cardiac cath 9/10/21: diagnostic cath revealing 50 % OM1, patent pLAD stent, patent RPDA stent, ld  of dLAD w/ bridging collaterals,   TUSHAR 9/10/21 revealed  Dilated LA, .No LA/RA/BENITEZ/RAA thrombus seen. Prolapse of the posterior leaflet predominantly of P2-P3 scallops with P2 flail. Severe anteriorly directed mitral regurgitation seen at a blood pressure of 140/70 mmHg. Small fibrin strand measuring 0.9 cm on the ventricular aspect of the aortic valve. Trace aortic regurgitation.Trivial pericardial effusion. Moderate plaque seen in the visualized portion of the descending aorta. Moderate plaque seen in the visualized portion of the aortic arch.    TUSHAR reviewed by Dr. Fabian; pt to f/u with CTS Dr. Jarquin in 1 week as per d/w Dr. Fabian.    1.	Stable for discharge as per attending  __Judith_______ after bed rest, pt voids, wrist stable and 30 minutes of ambulation.  2.	Follow-up with PMD/Cardiologist __DR. Jarquin________ in 1 week and f/u with Dr. Fabian in 1-2 weeks  3.	Discharged forms signed and copies in chart

## 2021-09-14 ENCOUNTER — APPOINTMENT (OUTPATIENT)
Dept: CARDIOTHORACIC SURGERY | Facility: CLINIC | Age: 71
End: 2021-09-14
Payer: MEDICARE

## 2021-09-14 ENCOUNTER — OUTPATIENT (OUTPATIENT)
Dept: OUTPATIENT SERVICES | Facility: HOSPITAL | Age: 71
LOS: 1 days | End: 2021-09-14
Payer: MEDICARE

## 2021-09-14 VITALS
SYSTOLIC BLOOD PRESSURE: 126 MMHG | HEART RATE: 53 BPM | RESPIRATION RATE: 16 BRPM | TEMPERATURE: 97.7 F | OXYGEN SATURATION: 97 % | WEIGHT: 190 LBS | DIASTOLIC BLOOD PRESSURE: 57 MMHG | HEIGHT: 66 IN | BODY MASS INDEX: 30.53 KG/M2

## 2021-09-14 DIAGNOSIS — M17.0 BILATERAL PRIMARY OSTEOARTHRITIS OF KNEE: ICD-10-CM

## 2021-09-14 DIAGNOSIS — Z95.5 PRESENCE OF CORONARY ANGIOPLASTY IMPLANT AND GRAFT: ICD-10-CM

## 2021-09-14 DIAGNOSIS — Z82.49 FAMILY HISTORY OF ISCHEMIC HEART DISEASE AND OTHER DISEASES OF THE CIRCULATORY SYSTEM: ICD-10-CM

## 2021-09-14 DIAGNOSIS — Z82.3 FAMILY HISTORY OF STROKE: ICD-10-CM

## 2021-09-14 DIAGNOSIS — Z86.39 PERSONAL HISTORY OF OTHER ENDOCRINE, NUTRITIONAL AND METABOLIC DISEASE: ICD-10-CM

## 2021-09-14 DIAGNOSIS — Z87.891 PERSONAL HISTORY OF NICOTINE DEPENDENCE: ICD-10-CM

## 2021-09-14 DIAGNOSIS — Z86.79 PERSONAL HISTORY OF OTHER DISEASES OF THE CIRCULATORY SYSTEM: ICD-10-CM

## 2021-09-14 LAB
A1C WITH ESTIMATED AVERAGE GLUCOSE RESULT: 6.2 % — HIGH (ref 4–5.6)
ALBUMIN SERPL ELPH-MCNC: 4.6 G/DL — SIGNIFICANT CHANGE UP (ref 3.3–5)
ALP SERPL-CCNC: 102 U/L — SIGNIFICANT CHANGE UP (ref 40–120)
ALT FLD-CCNC: 23 U/L — SIGNIFICANT CHANGE UP (ref 10–45)
ANION GAP SERPL CALC-SCNC: 10 MMOL/L — SIGNIFICANT CHANGE UP (ref 5–17)
APPEARANCE UR: CLEAR — SIGNIFICANT CHANGE UP
APTT BLD: 30.2 SEC — SIGNIFICANT CHANGE UP (ref 27.5–35.5)
AST SERPL-CCNC: 21 U/L — SIGNIFICANT CHANGE UP (ref 10–40)
BASOPHILS # BLD AUTO: 0.05 K/UL — SIGNIFICANT CHANGE UP (ref 0–0.2)
BASOPHILS NFR BLD AUTO: 0.9 % — SIGNIFICANT CHANGE UP (ref 0–2)
BILIRUB SERPL-MCNC: 0.5 MG/DL — SIGNIFICANT CHANGE UP (ref 0.2–1.2)
BILIRUB UR-MCNC: NEGATIVE — SIGNIFICANT CHANGE UP
BUN SERPL-MCNC: 15 MG/DL — SIGNIFICANT CHANGE UP (ref 7–23)
CALCIUM SERPL-MCNC: 9.7 MG/DL — SIGNIFICANT CHANGE UP (ref 8.4–10.5)
CHLORIDE SERPL-SCNC: 102 MMOL/L — SIGNIFICANT CHANGE UP (ref 96–108)
CHOLEST SERPL-MCNC: 163 MG/DL — SIGNIFICANT CHANGE UP
CO2 SERPL-SCNC: 25 MMOL/L — SIGNIFICANT CHANGE UP (ref 22–31)
COLOR SPEC: YELLOW — SIGNIFICANT CHANGE UP
CREAT SERPL-MCNC: 0.98 MG/DL — SIGNIFICANT CHANGE UP (ref 0.5–1.3)
DIFF PNL FLD: NEGATIVE — SIGNIFICANT CHANGE UP
EOSINOPHIL # BLD AUTO: 0.18 K/UL — SIGNIFICANT CHANGE UP (ref 0–0.5)
EOSINOPHIL NFR BLD AUTO: 3.3 % — SIGNIFICANT CHANGE UP (ref 0–6)
ESTIMATED AVERAGE GLUCOSE: 131 MG/DL — HIGH (ref 68–114)
GLUCOSE SERPL-MCNC: 121 MG/DL — HIGH (ref 70–99)
GLUCOSE UR QL: NEGATIVE — SIGNIFICANT CHANGE UP
HBV SURFACE AG SER-ACNC: SIGNIFICANT CHANGE UP
HCT VFR BLD CALC: 42.9 % — SIGNIFICANT CHANGE UP (ref 39–50)
HDLC SERPL-MCNC: 51 MG/DL — SIGNIFICANT CHANGE UP
HGB BLD-MCNC: 14.3 G/DL — SIGNIFICANT CHANGE UP (ref 13–17)
IMM GRANULOCYTES NFR BLD AUTO: 0.4 % — SIGNIFICANT CHANGE UP (ref 0–1.5)
INR BLD: 0.95 — SIGNIFICANT CHANGE UP (ref 0.88–1.16)
KETONES UR-MCNC: NEGATIVE — SIGNIFICANT CHANGE UP
LEUKOCYTE ESTERASE UR-ACNC: NEGATIVE — SIGNIFICANT CHANGE UP
LIPID PNL WITH DIRECT LDL SERPL: 67 MG/DL — SIGNIFICANT CHANGE UP
LYMPHOCYTES # BLD AUTO: 1.22 K/UL — SIGNIFICANT CHANGE UP (ref 1–3.3)
LYMPHOCYTES # BLD AUTO: 22.3 % — SIGNIFICANT CHANGE UP (ref 13–44)
MCHC RBC-ENTMCNC: 29 PG — SIGNIFICANT CHANGE UP (ref 27–34)
MCHC RBC-ENTMCNC: 33.3 GM/DL — SIGNIFICANT CHANGE UP (ref 32–36)
MCV RBC AUTO: 87 FL — SIGNIFICANT CHANGE UP (ref 80–100)
MONOCYTES # BLD AUTO: 0.6 K/UL — SIGNIFICANT CHANGE UP (ref 0–0.9)
MONOCYTES NFR BLD AUTO: 10.9 % — SIGNIFICANT CHANGE UP (ref 2–14)
NEUTROPHILS # BLD AUTO: 3.41 K/UL — SIGNIFICANT CHANGE UP (ref 1.8–7.4)
NEUTROPHILS NFR BLD AUTO: 62.2 % — SIGNIFICANT CHANGE UP (ref 43–77)
NITRITE UR-MCNC: NEGATIVE — SIGNIFICANT CHANGE UP
NON HDL CHOLESTEROL: 112 MG/DL — SIGNIFICANT CHANGE UP
NRBC # BLD: 0 /100 WBCS — SIGNIFICANT CHANGE UP (ref 0–0)
PH UR: 6 — SIGNIFICANT CHANGE UP (ref 5–8)
PLATELET # BLD AUTO: 170 K/UL — SIGNIFICANT CHANGE UP (ref 150–400)
POTASSIUM SERPL-MCNC: 4 MMOL/L — SIGNIFICANT CHANGE UP (ref 3.5–5.3)
POTASSIUM SERPL-SCNC: 4 MMOL/L — SIGNIFICANT CHANGE UP (ref 3.5–5.3)
PROT SERPL-MCNC: 7.5 G/DL — SIGNIFICANT CHANGE UP (ref 6–8.3)
PROT UR-MCNC: NEGATIVE MG/DL — SIGNIFICANT CHANGE UP
PROTHROM AB SERPL-ACNC: 11.4 SEC — SIGNIFICANT CHANGE UP (ref 10.6–13.6)
RBC # BLD: 4.93 M/UL — SIGNIFICANT CHANGE UP (ref 4.2–5.8)
RBC # FLD: 12.9 % — SIGNIFICANT CHANGE UP (ref 10.3–14.5)
SODIUM SERPL-SCNC: 137 MMOL/L — SIGNIFICANT CHANGE UP (ref 135–145)
SP GR SPEC: 1.01 — SIGNIFICANT CHANGE UP (ref 1–1.03)
TRIGL SERPL-MCNC: 224 MG/DL — HIGH
TSH SERPL-MCNC: 1.76 UIU/ML — SIGNIFICANT CHANGE UP (ref 0.27–4.2)
UROBILINOGEN FLD QL: 0.2 E.U./DL — SIGNIFICANT CHANGE UP
WBC # BLD: 5.48 K/UL — SIGNIFICANT CHANGE UP (ref 3.8–10.5)
WBC # FLD AUTO: 5.48 K/UL — SIGNIFICANT CHANGE UP (ref 3.8–10.5)

## 2021-09-14 PROCEDURE — 83036 HEMOGLOBIN GLYCOSYLATED A1C: CPT

## 2021-09-14 PROCEDURE — 86900 BLOOD TYPING SEROLOGIC ABO: CPT

## 2021-09-14 PROCEDURE — 85025 COMPLETE CBC W/AUTO DIFF WBC: CPT

## 2021-09-14 PROCEDURE — 85610 PROTHROMBIN TIME: CPT

## 2021-09-14 PROCEDURE — 84443 ASSAY THYROID STIM HORMONE: CPT

## 2021-09-14 PROCEDURE — 36415 COLL VENOUS BLD VENIPUNCTURE: CPT

## 2021-09-14 PROCEDURE — 87340 HEPATITIS B SURFACE AG IA: CPT

## 2021-09-14 PROCEDURE — 71046 X-RAY EXAM CHEST 2 VIEWS: CPT | Mod: 26

## 2021-09-14 PROCEDURE — 71046 X-RAY EXAM CHEST 2 VIEWS: CPT

## 2021-09-14 PROCEDURE — 80053 COMPREHEN METABOLIC PANEL: CPT

## 2021-09-14 PROCEDURE — 99205 OFFICE O/P NEW HI 60 MIN: CPT

## 2021-09-14 PROCEDURE — 86901 BLOOD TYPING SEROLOGIC RH(D): CPT

## 2021-09-14 PROCEDURE — 81003 URINALYSIS AUTO W/O SCOPE: CPT

## 2021-09-14 PROCEDURE — 85730 THROMBOPLASTIN TIME PARTIAL: CPT

## 2021-09-14 PROCEDURE — 80061 LIPID PANEL: CPT

## 2021-09-14 PROCEDURE — 86850 RBC ANTIBODY SCREEN: CPT

## 2021-09-15 ENCOUNTER — NON-APPOINTMENT (OUTPATIENT)
Age: 71
End: 2021-09-15

## 2021-09-15 PROBLEM — Z82.3 FAMILY HISTORY OF CEREBROVASCULAR ACCIDENT (CVA): Status: ACTIVE | Noted: 2021-09-15

## 2021-09-15 PROBLEM — Z86.39 HISTORY OF DIABETES MELLITUS: Status: RESOLVED | Noted: 2021-09-15 | Resolved: 2021-09-15

## 2021-09-15 PROBLEM — Z86.39 HISTORY OF HYPERLIPIDEMIA: Status: RESOLVED | Noted: 2021-09-15 | Resolved: 2021-09-15

## 2021-09-15 PROBLEM — Z82.49 FAMILY HISTORY OF HEART ATTACK: Status: ACTIVE | Noted: 2021-09-15

## 2021-09-15 PROBLEM — Z87.891 FORMER SMOKER: Status: ACTIVE | Noted: 2021-09-15

## 2021-09-15 PROBLEM — M17.0 PRIMARY OSTEOARTHRITIS OF BOTH KNEES: Status: RESOLVED | Noted: 2021-09-15 | Resolved: 2021-09-15

## 2021-09-15 PROBLEM — Z95.5 PRESENCE OF STENT IN LAD CORONARY ARTERY: Status: RESOLVED | Noted: 2021-09-15 | Resolved: 2021-09-15

## 2021-09-15 PROBLEM — Z86.79 HISTORY OF HYPERTENSION: Status: RESOLVED | Noted: 2021-09-15 | Resolved: 2021-09-15

## 2021-09-15 RX ORDER — GABAPENTIN 300 MG/1
300 CAPSULE ORAL
Qty: 90 | Refills: 0 | Status: COMPLETED | COMMUNITY
Start: 2020-12-04 | End: 2021-09-15

## 2021-09-15 RX ORDER — MOMETASONE FUROATE 1 MG/G
0.1 OINTMENT TOPICAL
Qty: 45 | Refills: 0 | Status: COMPLETED | COMMUNITY
Start: 2018-02-17 | End: 2021-09-15

## 2021-09-15 RX ORDER — OFLOXACIN OTIC 3 MG/ML
0.3 SOLUTION AURICULAR (OTIC)
Qty: 5 | Refills: 0 | Status: COMPLETED | COMMUNITY
Start: 2018-11-06 | End: 2021-09-15

## 2021-09-15 RX ORDER — CIPROFLOXACIN AND DEXAMETHASONE 3; 1 MG/ML; MG/ML
0.3-0.1 SUSPENSION/ DROPS AURICULAR (OTIC)
Qty: 8 | Refills: 0 | Status: COMPLETED | COMMUNITY
Start: 2018-11-06 | End: 2021-09-15

## 2021-09-15 RX ORDER — FLUTICASONE PROPIONATE 50 UG/1
50 SPRAY, METERED NASAL
Qty: 16 | Refills: 0 | Status: COMPLETED | COMMUNITY
Start: 2018-10-24 | End: 2021-09-15

## 2021-09-15 RX ORDER — METFORMIN ER 500 MG 500 MG/1
500 TABLET ORAL
Qty: 30 | Refills: 0 | Status: COMPLETED | COMMUNITY
Start: 2020-10-23 | End: 2021-09-15

## 2021-09-15 RX ORDER — LOTEPREDNOL ETABONATE 5 MG/G
0.5 GEL OPHTHALMIC
Qty: 5 | Refills: 0 | Status: COMPLETED | COMMUNITY
Start: 2018-09-05 | End: 2021-09-15

## 2021-09-15 RX ORDER — CYCLOSPORINE 0.5 MG/ML
0.05 EMULSION OPHTHALMIC
Qty: 60 | Refills: 0 | Status: COMPLETED | COMMUNITY
Start: 2018-11-05 | End: 2021-09-15

## 2021-09-15 RX ORDER — AMOXICILLIN AND CLAVULANATE POTASSIUM 500; 125 MG/1; MG/1
500-125 TABLET, FILM COATED ORAL
Qty: 42 | Refills: 0 | Status: COMPLETED | COMMUNITY
Start: 2018-10-24 | End: 2021-09-15

## 2021-09-15 RX ORDER — TAMSULOSIN HYDROCHLORIDE 0.4 MG/1
0.4 CAPSULE ORAL
Qty: 30 | Refills: 2 | Status: ACTIVE | COMMUNITY
Start: 2018-04-27

## 2021-09-15 NOTE — REVIEW OF SYSTEMS
[Eyesight Problems] : eyesight problems [Joint Swelling] : joint swelling [Joint Stiffness] : joint stiffness [Limb Pain] : limb pain [Negative] : Heme/Lymph

## 2021-09-16 VITALS
WEIGHT: 190.04 LBS | TEMPERATURE: 98 F | DIASTOLIC BLOOD PRESSURE: 57 MMHG | SYSTOLIC BLOOD PRESSURE: 126 MMHG | HEART RATE: 55 BPM | HEIGHT: 66 IN | OXYGEN SATURATION: 97 % | RESPIRATION RATE: 16 BRPM

## 2021-09-16 NOTE — DATA REVIEWED
[FreeTextEntry1] : 9/10/21 TUSHAR: \par  1. Normal left and right ventricular size and systolic function.\par  2. Dilated left atrium.\par  3. No LA/RA/BENITEZ/RAA thrombus seen.\par  4. No evidence of an intracardiac shunt.\par  5. Prolapse of the posterior leaflet predominantly of P2-P3 scallops with P2 flail. Severe anteriorly directed mitral regurgitation seen at a blood pressure of 140/70 mmHg.\par  6. Small fibrin strand measuring 0.9 cm on the ventricular aspect of the aortic valve. Trace aortic regurgitation.\par  7. No evidence of pulmonary hypertension.\par  8. Trivial pericardial effusion.\par  9. Moderate plaque seen in the visualized portion of the descending aorta. Moderate plaque seen in the visualized portion of the aortic arch.\par 10. No prior echo is available for comparison.\par \par 9/10/21 Cardiac cath: 50 % OM1, patent pLAD stent, patent RPDA stent, ld  of dLAD w/ bridging collaterals, \par \par 5/15/2018 Cardiac Cath: HUGO to RPDA; LM normal, patent pLAD stent, old  of dLAD w/ bridging collaterals, LCx luminal, RCA luminal.  \par

## 2021-09-16 NOTE — ASSESSMENT
[FreeTextEntry1] : 70 y/o Macedonian/English speaking male, former smoker, presents w/ PMHx of HTN, HLD, DM, tonsil cancer s/p surgery + radiation in 2019 and CAD (s/p PCI  2018)with severe mitral valve insufficiency.  Dr. Jarquin reviewed the cardiac cath images and echocardiogram images with the patient and  his wife and discussed the case with Dr. Fabian.  Dr. Jarquin discussed the risks, benefits and alternatives to surgery and the various surgical approaches, minimally invasive versus sternotomy.    Risks include but not limited to death, heart attack, bleeding, stroke, kidney problems and infection.  Dr. Jarquin quoted a 2-3% operative mortality and complication risk. Dr. Jarquin feels the patient will benefit from and is a candidate for minimally invasive, mitral valve repair/possible replacement w/bioprosthesis.  All questions were addressed and the patient agrees to proceed with surgery. \par \par Plan: \par PST--labs, xray, u/a\par Blood drawn in office and reviewed in sunrise\par EKG performed in office and results scanned into [employer]\par covid test 9/17, results in HIE\par SDA 9/20\par patient instructed to take Toprol XL  on morning of surgery\par instructions provided re antibacterial showers and pt given 3 sponges\par pt instructed on use of the incentive spirometer \par \par \par

## 2021-09-16 NOTE — PHYSICAL EXAM
[General Appearance - Alert] : alert [General Appearance - In No Acute Distress] : in no acute distress [Sclera] : the sclera and conjunctiva were normal [PERRL With Normal Accommodation] : pupils were equal in size, round, and reactive to light [Extraocular Movements] : extraocular movements were intact [Outer Ear] : the ears and nose were normal in appearance [Oropharynx] : the oropharynx was normal [Neck Appearance] : the appearance of the neck was normal [Neck Cervical Mass (___cm)] : no neck mass was observed [Jugular Venous Distention Increased] : there was no jugular-venous distention [Thyroid Diffuse Enlargement] : the thyroid was not enlarged [Thyroid Nodule] : there were no palpable thyroid nodules [Auscultation Breath Sounds / Voice Sounds] : lungs were clear to auscultation bilaterally [Heart Rate And Rhythm] : heart rate was normal and rhythm regular [Examination Of The Chest] : the chest was normal in appearance [Chest Visual Inspection Thoracic Asymmetry] : no chest asymmetry [Diminished Respiratory Excursion] : normal chest expansion [2+] : left 2+ [1+] : left 1+ [No Abnormalities] : the abdominal aorta was not enlarged and no bruit was heard [Bowel Sounds] : normal bowel sounds [Abdomen Soft] : soft [Abdomen Tenderness] : non-tender [Abdomen Mass (___ Cm)] : no abdominal mass palpated [No CVA Tenderness] : no ~M costovertebral angle tenderness [No Spinal Tenderness] : no spinal tenderness [Skin Color & Pigmentation] : normal skin color and pigmentation [Skin Turgor] : normal skin turgor [] : no rash [Deep Tendon Reflexes (DTR)] : deep tendon reflexes were 2+ and symmetric [Sensation] : the sensory exam was normal to light touch and pinprick [No Focal Deficits] : no focal deficits [Oriented To Time, Place, And Person] : oriented to person, place, and time [Impaired Insight] : insight and judgment were intact [Affect] : the affect was normal [Right Carotid Bruit] : no bruit heard over the right carotid [Left Carotid Bruit] : no bruit heard over the left carotid [Right Femoral Bruit] : no bruit heard over the right femoral artery [Left Femoral Bruit] : no bruit heard over the left femoral artery [FreeTextEntry1] : decreased range of motion in legs, unsteady gait

## 2021-09-16 NOTE — H&P ADULT - NSHPLABSRESULTS_GEN_ALL_CORE
Hgb A1C =6.2  creat = 0.98  hct= 42.9  hgb= 14.3  plt= 170  WBC= 5.48  INR=0.95  tot alb= 4.6  tot bili= 0.5    TSH= 1.760    9/14/21 Chest xray: clear lungs    9/1021 EKG: SB w/1st deg AV block, 50 bpm    9/10/21 TUSHAR:    1. Normal left and right ventricular size and systolic function.   2. Dilated left atrium.   3. No LA/RA/BENITEZ/RAA thrombus seen.   4. No evidence of an intracardiac shunt.   5. Prolapse of the posterior leaflet predominantly of P2-P3 scallops with P2 flail. Severe anteriorly directed mitral regurgitation seen at a blood pressure of 140/70 mmHg.   6. Small fibrin strand measuring 0.9 cm on the ventricular aspect of the aortic valve. Trace aortic regurgitation.   7. No evidence of pulmonary hypertension.   8. Trivial pericardial effusion.   9. Moderate plaque seen in the visualized portion of the descending aorta. Moderate plaque seen in the visualized portion of the aortic arch.  10. No prior echo is available for comparison.    9/10/21 Cardiac cath: 50 % OM1, patent pLAD stent, patent RPDA stent, ld  of dLAD w/ bridging collaterals,     5/15/2018 Cardiac Cath: HUGO to RPDA; LM normal, patent pLAD stent, old  of dLAD w/ bridging collaterals, LCx luminal, RCA luminal.

## 2021-09-16 NOTE — END OF VISIT
[Time Spent: ___ minutes] : I have spent [unfilled] minutes of time on the encounter. [FreeTextEntry3] : I, KAITLYN TANG , am scribing for and in the presence of ANGLE MENDOZA the following sections: History of present illness, past Medical/family/surgical/family/social history, review of systems, vital signs, physical exam and disposition.\par I personally performed the services described in the documentation, reviewed the documentation recorded by the scribe in my presence and it accurately and completely records my words and actions.\par

## 2021-09-16 NOTE — HISTORY OF PRESENT ILLNESS
[FreeTextEntry1] : 72 y/o Macedonian/English speaking male, former smoker, presents w/ PMHx of HTN, HLD, DM, tonsil cancer s/p surgery + radiation in 2019 and CAD (s/p PCI  2018) with severe mitral valve insufficiency. He presented to his cardiologist, Dr. Fabian, for further evaluation of mitral regurgitation and cardiac clearance prior to knee replacement surgery. TTE (7/22/21): LVEF 50-55%, LV apex hypokinetic and aneurysmal, severe MR, minimal TR. Denies CP, SOB, palpitations, dizziness/syncope, abdominal pain, N/V, fever/chillls, LE edema, orthopnea, PND, exposure to COVID positive people, cough, loss of taste/smell. He has received covid vaccine x 2. 9/10/21 cardiac cath revealed patent LAD and RPDA stents. \par \par He presents today for surgical consult with Dr. Jarquin accompanied by his wife. He appears generally well, NAD. He is using rollator s/t knee pain. NYHA III. \par \par \par \par

## 2021-09-16 NOTE — H&P ADULT - HISTORY OF PRESENT ILLNESS
72 y/o Pashto/English speaking male, former smoker, presents w/ PMHx of HTN, HLD, DM, tonsil cancer s/p surgery + radiation in 2019 and CAD (s/p PCI 2018) with severe mitral valve insufficiency. He presented to his cardiologist, Dr. Fabian, for further evaluation of mitral regurgitation and cardiac clearance prior to knee replacement surgery. TTE (7/22/21): LVEF 50-55%, LV apex hypokinetic and aneurysmal, severe MR, minimal TR. Denies CP, SOB, palpitations, dizziness/syncope, abdominal pain, N/V, fever/chillls, LE edema, orthopnea, PND, exposure to COVID positive people, cough, loss of taste/smell. He has received covid vaccine x 2. 9/10/21 cardiac cath revealed patent LAD and RPDA stents.     He was seen in the outpatient office for surgical consult with Dr. Jarquin and now presents for elective surgery. NYHA III.    72 y/o Turkmen/English speaking male, former smoker, presents w/ PMHx of HTN, HLD, DM, tonsil cancer s/p surgery + radiation in 2019 and CAD (s/p PCI 2018) with severe mitral valve insufficiency. He presented to his cardiologist, Dr. Fabian, for further evaluation of mitral regurgitation and cardiac clearance prior to knee replacement surgery. TTE (7/22/21): LVEF 50-55%, LV apex hypokinetic and aneurysmal, severe MR, minimal TR. Denies CP, SOB, palpitations, dizziness/syncope, abdominal pain, N/V, fever/chillls, LE edema, orthopnea, PND, exposure to COVID positive people, cough, loss of taste/smell. He has received covid vaccine x 2. 9/10/21 cardiac cath revealed patent LAD and RPDA stents.     He was seen in the outpatient office for surgical consult with Dr. Jarquin and now presents for elective surgery. NYHA III.     Patient seen in same day holding area; Reports no changes to PMHx or medications since last seen by our team. Denies acute or current SOB, chest pain, palpitation, N/V/D, fever/chills, recent illness, or any other concerning symptoms.

## 2021-09-16 NOTE — H&P ADULT - ASSESSMENT
70 y/o Belarusian/English speaking male, former smoker, presents w/ PMHx of HTN, HLD, DM, tonsil cancer s/p surgery + radiation in 2019 and CAD (s/p PCI 2018)with severe mitral valve insufficiency. Dr. Jarquin reviewed the cardiac cath images and echocardiogram images with the patient and his wife and discussed the case with Dr. Fabian. Dr. Jarquin discussed the risks, benefits and alternatives to surgery and the various surgical approaches, minimally invasive versus sternotomy. Risks include but not limited to death, heart attack, bleeding, stroke, kidney problems and infection. Dr. Jarquin quoted a 2-3% operative mortality and complication risk. Dr. Jarquin feels the patient will benefit from and is a candidate for minimally invasive, mitral valve repair/possible replacement w/bioprosthesis. All questions were addressed and the patient agrees to proceed with surgery.     Plan:   PST  covid test 9/17, results in HIE  SDA 9/20  patient instructed to take Toprol XL on morning of surgery  instructions provided re antibacterial showers and pt given 3 sponges  pt instructed on use of the incentive spirometer    72 y/o Yoruba/English speaking male, former smoker, presents w/ PMHx of HTN, HLD, DM, tonsil cancer s/p surgery + radiation in 2019 and CAD (s/p PCI 2018)with severe mitral valve insufficiency. Dr. Jarquin reviewed the cardiac cath images and echocardiogram images with the patient and his wife and discussed the case with Dr. Fabian. Dr. Jarquin discussed the risks, benefits and alternatives to surgery and the various surgical approaches, minimally invasive versus sternotomy. Risks include but not limited to death, heart attack, bleeding, stroke, kidney problems and infection. Dr. Jarquin quoted a 2-3% operative mortality and complication risk. Dr. Jarquin feels the patient will benefit from and is a candidate for minimally invasive, mitral valve repair/possible replacement w/bioprosthesis. All questions were addressed and the patient agrees to proceed with surgery.     Plan:   PST  covid test 9/17, results in HIE  SDA 9/20  patient instructed to take Toprol XL on morning of surgery  instructions provided re antibacterial showers and pt given 3 sponges  pt instructed on use of the incentive spirometer     Admit under Dr. Jarquin via same day surgery. Consent signed, placed on chart.  Risks/benefits reviewed, patient understands and agrees. T&S ordered and blood products placed on hold for OR.  To 9E  post-op.

## 2021-09-17 ENCOUNTER — APPOINTMENT (OUTPATIENT)
Dept: DISASTER EMERGENCY | Facility: CLINIC | Age: 71
End: 2021-09-17

## 2021-09-17 DIAGNOSIS — I25.82 CHRONIC TOTAL OCCLUSION OF CORONARY ARTERY: ICD-10-CM

## 2021-09-17 DIAGNOSIS — Z95.5 PRESENCE OF CORONARY ANGIOPLASTY IMPLANT AND GRAFT: ICD-10-CM

## 2021-09-17 DIAGNOSIS — I34.0 NONRHEUMATIC MITRAL (VALVE) INSUFFICIENCY: ICD-10-CM

## 2021-09-17 DIAGNOSIS — Z01.810 ENCOUNTER FOR PREPROCEDURAL CARDIOVASCULAR EXAMINATION: ICD-10-CM

## 2021-09-17 RX ORDER — INFLUENZA VIRUS VACCINE 15; 15; 15; 15 UG/.5ML; UG/.5ML; UG/.5ML; UG/.5ML
0.5 SUSPENSION INTRAMUSCULAR ONCE
Refills: 0 | Status: DISCONTINUED | OUTPATIENT
Start: 2021-09-20 | End: 2021-09-25

## 2021-09-17 NOTE — PATIENT PROFILE ADULT - AGENT'S NAME
Noah Wong (patients wife) Noah Wong (patients wife), Kristi Judith(niece): [phone] normal affect/normal behavior

## 2021-09-17 NOTE — PATIENT PROFILE ADULT - NSPROEXTENSIONSOFSELF_GEN_A_NUR
walker user outside, due to right knee pain which caused tripped while he was goind down from stairs/eyeglasses

## 2021-09-19 ENCOUNTER — TRANSCRIPTION ENCOUNTER (OUTPATIENT)
Age: 71
End: 2021-09-19

## 2021-09-19 LAB — SARS-COV-2 N GENE NPH QL NAA+PROBE: NOT DETECTED

## 2021-09-20 ENCOUNTER — INPATIENT (INPATIENT)
Facility: HOSPITAL | Age: 71
LOS: 4 days | Discharge: HOME | DRG: 219 | End: 2021-09-25
Attending: THORACIC SURGERY (CARDIOTHORACIC VASCULAR SURGERY) | Admitting: THORACIC SURGERY (CARDIOTHORACIC VASCULAR SURGERY)
Payer: MEDICARE

## 2021-09-20 ENCOUNTER — RESULT REVIEW (OUTPATIENT)
Age: 71
End: 2021-09-20

## 2021-09-20 ENCOUNTER — APPOINTMENT (OUTPATIENT)
Dept: CARDIOTHORACIC SURGERY | Facility: HOSPITAL | Age: 71
End: 2021-09-20

## 2021-09-20 LAB
ALBUMIN SERPL ELPH-MCNC: 3 G/DL — LOW (ref 3.3–5)
ALBUMIN SERPL ELPH-MCNC: 4 G/DL — SIGNIFICANT CHANGE UP (ref 3.3–5)
ALBUMIN SERPL ELPH-MCNC: 4.4 G/DL — SIGNIFICANT CHANGE UP (ref 3.3–5)
ALBUMIN SERPL ELPH-MCNC: 4.5 G/DL — SIGNIFICANT CHANGE UP (ref 3.3–5)
ALP SERPL-CCNC: 41 U/L — SIGNIFICANT CHANGE UP (ref 40–120)
ALP SERPL-CCNC: 47 U/L — SIGNIFICANT CHANGE UP (ref 40–120)
ALP SERPL-CCNC: 61 U/L — SIGNIFICANT CHANGE UP (ref 40–120)
ALP SERPL-CCNC: 71 U/L — SIGNIFICANT CHANGE UP (ref 40–120)
ALT FLD-CCNC: 11 U/L — SIGNIFICANT CHANGE UP (ref 10–45)
ALT FLD-CCNC: 12 U/L — SIGNIFICANT CHANGE UP (ref 10–45)
ALT FLD-CCNC: 15 U/L — SIGNIFICANT CHANGE UP (ref 10–45)
ALT FLD-CCNC: 15 U/L — SIGNIFICANT CHANGE UP (ref 10–45)
ANION GAP SERPL CALC-SCNC: 10 MMOL/L — SIGNIFICANT CHANGE UP (ref 5–17)
ANION GAP SERPL CALC-SCNC: 12 MMOL/L — SIGNIFICANT CHANGE UP (ref 5–17)
ANION GAP SERPL CALC-SCNC: 13 MMOL/L — SIGNIFICANT CHANGE UP (ref 5–17)
ANION GAP SERPL CALC-SCNC: 20 MMOL/L — HIGH (ref 5–17)
APTT BLD: 32.5 SEC — SIGNIFICANT CHANGE UP (ref 27.5–35.5)
APTT BLD: 37.4 SEC — HIGH (ref 27.5–35.5)
APTT BLD: 38 SEC — HIGH (ref 27.5–35.5)
APTT BLD: 39.7 SEC — HIGH (ref 27.5–35.5)
AST SERPL-CCNC: 27 U/L — SIGNIFICANT CHANGE UP (ref 10–40)
AST SERPL-CCNC: 28 U/L — SIGNIFICANT CHANGE UP (ref 10–40)
AST SERPL-CCNC: 30 U/L — SIGNIFICANT CHANGE UP (ref 10–40)
AST SERPL-CCNC: 33 U/L — SIGNIFICANT CHANGE UP (ref 10–40)
BASE EXCESS BLDV CALC-SCNC: -2.4 MMOL/L — LOW (ref -2–3)
BASE EXCESS BLDV CALC-SCNC: -5.9 MMOL/L — LOW (ref -2–3)
BASOPHILS # BLD AUTO: 0.02 K/UL — SIGNIFICANT CHANGE UP (ref 0–0.2)
BASOPHILS NFR BLD AUTO: 0.1 % — SIGNIFICANT CHANGE UP (ref 0–2)
BILIRUB SERPL-MCNC: 0.6 MG/DL — SIGNIFICANT CHANGE UP (ref 0.2–1.2)
BILIRUB SERPL-MCNC: 0.7 MG/DL — SIGNIFICANT CHANGE UP (ref 0.2–1.2)
BILIRUB SERPL-MCNC: 0.8 MG/DL — SIGNIFICANT CHANGE UP (ref 0.2–1.2)
BILIRUB SERPL-MCNC: 1.1 MG/DL — SIGNIFICANT CHANGE UP (ref 0.2–1.2)
BUN SERPL-MCNC: 11 MG/DL — SIGNIFICANT CHANGE UP (ref 7–23)
BUN SERPL-MCNC: 12 MG/DL — SIGNIFICANT CHANGE UP (ref 7–23)
CA-I SERPL-SCNC: 1.14 MMOL/L — LOW (ref 1.15–1.33)
CA-I SERPL-SCNC: 1.16 MMOL/L — SIGNIFICANT CHANGE UP (ref 1.15–1.33)
CALCIUM SERPL-MCNC: 8.5 MG/DL — SIGNIFICANT CHANGE UP (ref 8.4–10.5)
CALCIUM SERPL-MCNC: 8.7 MG/DL — SIGNIFICANT CHANGE UP (ref 8.4–10.5)
CALCIUM SERPL-MCNC: 9 MG/DL — SIGNIFICANT CHANGE UP (ref 8.4–10.5)
CALCIUM SERPL-MCNC: 9.9 MG/DL — SIGNIFICANT CHANGE UP (ref 8.4–10.5)
CHLORIDE SERPL-SCNC: 107 MMOL/L — SIGNIFICANT CHANGE UP (ref 96–108)
CHLORIDE SERPL-SCNC: 108 MMOL/L — SIGNIFICANT CHANGE UP (ref 96–108)
CHLORIDE SERPL-SCNC: 108 MMOL/L — SIGNIFICANT CHANGE UP (ref 96–108)
CHLORIDE SERPL-SCNC: 110 MMOL/L — HIGH (ref 96–108)
CO2 BLDV-SCNC: 23.5 MMOL/L — SIGNIFICANT CHANGE UP (ref 22–26)
CO2 BLDV-SCNC: 24.5 MMOL/L — SIGNIFICANT CHANGE UP (ref 22–26)
CO2 SERPL-SCNC: 15 MMOL/L — LOW (ref 22–31)
CO2 SERPL-SCNC: 21 MMOL/L — LOW (ref 22–31)
CO2 SERPL-SCNC: 21 MMOL/L — LOW (ref 22–31)
CO2 SERPL-SCNC: 24 MMOL/L — SIGNIFICANT CHANGE UP (ref 22–31)
CREAT SERPL-MCNC: 0.86 MG/DL — SIGNIFICANT CHANGE UP (ref 0.5–1.3)
CREAT SERPL-MCNC: 0.89 MG/DL — SIGNIFICANT CHANGE UP (ref 0.5–1.3)
CREAT SERPL-MCNC: 0.93 MG/DL — SIGNIFICANT CHANGE UP (ref 0.5–1.3)
CREAT SERPL-MCNC: 0.94 MG/DL — SIGNIFICANT CHANGE UP (ref 0.5–1.3)
EOSINOPHIL # BLD AUTO: 0.04 K/UL — SIGNIFICANT CHANGE UP (ref 0–0.5)
EOSINOPHIL NFR BLD AUTO: 0.2 % — SIGNIFICANT CHANGE UP (ref 0–6)
FIBRINOGEN PPP-MCNC: 189 MG/DL — LOW (ref 258–438)
GAS PNL BLDA: SIGNIFICANT CHANGE UP
GAS PNL BLDV: 141 MMOL/L — SIGNIFICANT CHANGE UP (ref 136–145)
GAS PNL BLDV: 142 MMOL/L — SIGNIFICANT CHANGE UP (ref 136–145)
GAS PNL BLDV: SIGNIFICANT CHANGE UP
GAS PNL BLDV: SIGNIFICANT CHANGE UP
GLUCOSE BLDC GLUCOMTR-MCNC: 116 MG/DL — HIGH (ref 70–99)
GLUCOSE BLDC GLUCOMTR-MCNC: 118 MG/DL — HIGH (ref 70–99)
GLUCOSE BLDC GLUCOMTR-MCNC: 125 MG/DL — HIGH (ref 70–99)
GLUCOSE BLDC GLUCOMTR-MCNC: 155 MG/DL — HIGH (ref 70–99)
GLUCOSE BLDC GLUCOMTR-MCNC: 174 MG/DL — HIGH (ref 70–99)
GLUCOSE BLDC GLUCOMTR-MCNC: 187 MG/DL — HIGH (ref 70–99)
GLUCOSE BLDC GLUCOMTR-MCNC: 188 MG/DL — HIGH (ref 70–99)
GLUCOSE BLDC GLUCOMTR-MCNC: 193 MG/DL — HIGH (ref 70–99)
GLUCOSE BLDC GLUCOMTR-MCNC: 214 MG/DL — HIGH (ref 70–99)
GLUCOSE BLDC GLUCOMTR-MCNC: 99 MG/DL — SIGNIFICANT CHANGE UP (ref 70–99)
GLUCOSE SERPL-MCNC: 127 MG/DL — HIGH (ref 70–99)
GLUCOSE SERPL-MCNC: 170 MG/DL — HIGH (ref 70–99)
GLUCOSE SERPL-MCNC: 185 MG/DL — HIGH (ref 70–99)
GLUCOSE SERPL-MCNC: 221 MG/DL — HIGH (ref 70–99)
HCO3 BLDV-SCNC: 22 MMOL/L — SIGNIFICANT CHANGE UP (ref 22–29)
HCO3 BLDV-SCNC: 23 MMOL/L — SIGNIFICANT CHANGE UP (ref 22–29)
HCT VFR BLD CALC: 25.2 % — LOW (ref 39–50)
HCT VFR BLD CALC: 27.2 % — LOW (ref 39–50)
HCT VFR BLD CALC: 28.4 % — LOW (ref 39–50)
HCT VFR BLD CALC: 32.1 % — LOW (ref 39–50)
HGB BLD-MCNC: 10.6 G/DL — LOW (ref 13–17)
HGB BLD-MCNC: 8.6 G/DL — LOW (ref 13–17)
HGB BLD-MCNC: 9.1 G/DL — LOW (ref 13–17)
HGB BLD-MCNC: 9.2 G/DL — LOW (ref 13–17)
IMM GRANULOCYTES NFR BLD AUTO: 1.2 % — SIGNIFICANT CHANGE UP (ref 0–1.5)
INR BLD: 1.15 — SIGNIFICANT CHANGE UP (ref 0.88–1.16)
INR BLD: 1.18 — HIGH (ref 0.88–1.16)
INR BLD: 1.26 — HIGH (ref 0.88–1.16)
INR BLD: 1.31 — HIGH (ref 0.88–1.16)
LACTATE SERPL-SCNC: 3.5 MMOL/L — HIGH (ref 0.5–2)
LACTATE SERPL-SCNC: 6 MMOL/L — CRITICAL HIGH (ref 0.5–2)
LACTATE SERPL-SCNC: 7.4 MMOL/L — CRITICAL HIGH (ref 0.5–2)
LACTATE SERPL-SCNC: 9.8 MMOL/L — CRITICAL HIGH (ref 0.5–2)
LYMPHOCYTES # BLD AUTO: 1.29 K/UL — SIGNIFICANT CHANGE UP (ref 1–3.3)
LYMPHOCYTES # BLD AUTO: 8 % — LOW (ref 13–44)
MAGNESIUM SERPL-MCNC: 1.9 MG/DL — SIGNIFICANT CHANGE UP (ref 1.6–2.6)
MAGNESIUM SERPL-MCNC: 1.9 MG/DL — SIGNIFICANT CHANGE UP (ref 1.6–2.6)
MAGNESIUM SERPL-MCNC: 2.1 MG/DL — SIGNIFICANT CHANGE UP (ref 1.6–2.6)
MAGNESIUM SERPL-MCNC: 2.3 MG/DL — SIGNIFICANT CHANGE UP (ref 1.6–2.6)
MCHC RBC-ENTMCNC: 28.9 PG — SIGNIFICANT CHANGE UP (ref 27–34)
MCHC RBC-ENTMCNC: 29.1 PG — SIGNIFICANT CHANGE UP (ref 27–34)
MCHC RBC-ENTMCNC: 29.1 PG — SIGNIFICANT CHANGE UP (ref 27–34)
MCHC RBC-ENTMCNC: 29.8 PG — SIGNIFICANT CHANGE UP (ref 27–34)
MCHC RBC-ENTMCNC: 32.4 GM/DL — SIGNIFICANT CHANGE UP (ref 32–36)
MCHC RBC-ENTMCNC: 33 GM/DL — SIGNIFICANT CHANGE UP (ref 32–36)
MCHC RBC-ENTMCNC: 33.5 GM/DL — SIGNIFICANT CHANGE UP (ref 32–36)
MCHC RBC-ENTMCNC: 34.1 GM/DL — SIGNIFICANT CHANGE UP (ref 32–36)
MCV RBC AUTO: 86.9 FL — SIGNIFICANT CHANGE UP (ref 80–100)
MCV RBC AUTO: 87.2 FL — SIGNIFICANT CHANGE UP (ref 80–100)
MCV RBC AUTO: 87.5 FL — SIGNIFICANT CHANGE UP (ref 80–100)
MCV RBC AUTO: 89.9 FL — SIGNIFICANT CHANGE UP (ref 80–100)
MONOCYTES # BLD AUTO: 1.39 K/UL — HIGH (ref 0–0.9)
MONOCYTES NFR BLD AUTO: 8.6 % — SIGNIFICANT CHANGE UP (ref 2–14)
NEUTROPHILS # BLD AUTO: 13.22 K/UL — HIGH (ref 1.8–7.4)
NEUTROPHILS NFR BLD AUTO: 81.9 % — HIGH (ref 43–77)
NRBC # BLD: 0 /100 WBCS — SIGNIFICANT CHANGE UP (ref 0–0)
PCO2 BLDV: 42 MMHG — SIGNIFICANT CHANGE UP (ref 42–55)
PCO2 BLDV: 51 MMHG — SIGNIFICANT CHANGE UP (ref 42–55)
PH BLDV: 7.24 — LOW (ref 7.32–7.43)
PH BLDV: 7.35 — SIGNIFICANT CHANGE UP (ref 7.32–7.43)
PHOSPHATE SERPL-MCNC: 1.1 MG/DL — LOW (ref 2.5–4.5)
PHOSPHATE SERPL-MCNC: 2.4 MG/DL — LOW (ref 2.5–4.5)
PHOSPHATE SERPL-MCNC: 3 MG/DL — SIGNIFICANT CHANGE UP (ref 2.5–4.5)
PHOSPHATE SERPL-MCNC: 3.1 MG/DL — SIGNIFICANT CHANGE UP (ref 2.5–4.5)
PLATELET # BLD AUTO: 105 K/UL — LOW (ref 150–400)
PLATELET # BLD AUTO: 106 K/UL — LOW (ref 150–400)
PLATELET # BLD AUTO: 118 K/UL — LOW (ref 150–400)
PLATELET # BLD AUTO: 153 K/UL — SIGNIFICANT CHANGE UP (ref 150–400)
PO2 BLDV: 32 MMHG — SIGNIFICANT CHANGE UP (ref 25–45)
PO2 BLDV: 38 MMHG — SIGNIFICANT CHANGE UP (ref 25–45)
POTASSIUM BLDV-SCNC: 3.7 MMOL/L — SIGNIFICANT CHANGE UP (ref 3.5–5.1)
POTASSIUM BLDV-SCNC: 4.2 MMOL/L — SIGNIFICANT CHANGE UP (ref 3.5–5.1)
POTASSIUM SERPL-MCNC: 3.5 MMOL/L — SIGNIFICANT CHANGE UP (ref 3.5–5.3)
POTASSIUM SERPL-MCNC: 4 MMOL/L — SIGNIFICANT CHANGE UP (ref 3.5–5.3)
POTASSIUM SERPL-SCNC: 3.5 MMOL/L — SIGNIFICANT CHANGE UP (ref 3.5–5.3)
POTASSIUM SERPL-SCNC: 4 MMOL/L — SIGNIFICANT CHANGE UP (ref 3.5–5.3)
PROT SERPL-MCNC: 4.8 G/DL — LOW (ref 6–8.3)
PROT SERPL-MCNC: 5.5 G/DL — LOW (ref 6–8.3)
PROT SERPL-MCNC: 5.5 G/DL — LOW (ref 6–8.3)
PROT SERPL-MCNC: 5.8 G/DL — LOW (ref 6–8.3)
PROTHROM AB SERPL-ACNC: 13.7 SEC — HIGH (ref 10.6–13.6)
PROTHROM AB SERPL-ACNC: 14.1 SEC — HIGH (ref 10.6–13.6)
PROTHROM AB SERPL-ACNC: 14.9 SEC — HIGH (ref 10.6–13.6)
PROTHROM AB SERPL-ACNC: 15.5 SEC — HIGH (ref 10.6–13.6)
RBC # BLD: 2.89 M/UL — LOW (ref 4.2–5.8)
RBC # BLD: 3.13 M/UL — LOW (ref 4.2–5.8)
RBC # BLD: 3.16 M/UL — LOW (ref 4.2–5.8)
RBC # BLD: 3.67 M/UL — LOW (ref 4.2–5.8)
RBC # FLD: 12.8 % — SIGNIFICANT CHANGE UP (ref 10.3–14.5)
RBC # FLD: 13 % — SIGNIFICANT CHANGE UP (ref 10.3–14.5)
RBC # FLD: 13.2 % — SIGNIFICANT CHANGE UP (ref 10.3–14.5)
RBC # FLD: 13.2 % — SIGNIFICANT CHANGE UP (ref 10.3–14.5)
SAO2 % BLDV: 65.6 % — LOW (ref 67–88)
SAO2 % BLDV: 66.6 % — LOW (ref 67–88)
SODIUM SERPL-SCNC: 141 MMOL/L — SIGNIFICANT CHANGE UP (ref 135–145)
SODIUM SERPL-SCNC: 142 MMOL/L — SIGNIFICANT CHANGE UP (ref 135–145)
SODIUM SERPL-SCNC: 142 MMOL/L — SIGNIFICANT CHANGE UP (ref 135–145)
SODIUM SERPL-SCNC: 144 MMOL/L — SIGNIFICANT CHANGE UP (ref 135–145)
TROPONIN T SERPL-MCNC: 0.37 NG/ML — CRITICAL HIGH (ref 0–0.01)
WBC # BLD: 15.43 K/UL — HIGH (ref 3.8–10.5)
WBC # BLD: 16.16 K/UL — HIGH (ref 3.8–10.5)
WBC # BLD: 7.01 K/UL — SIGNIFICANT CHANGE UP (ref 3.8–10.5)
WBC # BLD: 8.62 K/UL — SIGNIFICANT CHANGE UP (ref 3.8–10.5)
WBC # FLD AUTO: 15.43 K/UL — HIGH (ref 3.8–10.5)
WBC # FLD AUTO: 16.16 K/UL — HIGH (ref 3.8–10.5)
WBC # FLD AUTO: 7.01 K/UL — SIGNIFICANT CHANGE UP (ref 3.8–10.5)
WBC # FLD AUTO: 8.62 K/UL — SIGNIFICANT CHANGE UP (ref 3.8–10.5)

## 2021-09-20 PROCEDURE — 71045 X-RAY EXAM CHEST 1 VIEW: CPT | Mod: 26

## 2021-09-20 PROCEDURE — 93308 TTE F-UP OR LMTD: CPT | Mod: 26

## 2021-09-20 PROCEDURE — 93321 DOPPLER ECHO F-UP/LMTD STD: CPT | Mod: 26

## 2021-09-20 PROCEDURE — 33426 REPAIR OF MITRAL VALVE: CPT

## 2021-09-20 PROCEDURE — 88305 TISSUE EXAM BY PATHOLOGIST: CPT | Mod: 26

## 2021-09-20 PROCEDURE — 99292 CRITICAL CARE ADDL 30 MIN: CPT

## 2021-09-20 PROCEDURE — 93010 ELECTROCARDIOGRAM REPORT: CPT

## 2021-09-20 PROCEDURE — 99291 CRITICAL CARE FIRST HOUR: CPT

## 2021-09-20 RX ORDER — PANTOPRAZOLE SODIUM 20 MG/1
40 TABLET, DELAYED RELEASE ORAL DAILY
Refills: 0 | Status: DISCONTINUED | OUTPATIENT
Start: 2021-09-20 | End: 2021-09-21

## 2021-09-20 RX ORDER — CISATRACURIUM BESYLATE 2 MG/ML
10 INJECTION INTRAVENOUS ONCE
Refills: 0 | Status: COMPLETED | OUTPATIENT
Start: 2021-09-20 | End: 2021-09-20

## 2021-09-20 RX ORDER — EPINEPHRINE 0.3 MG/.3ML
0.2 INJECTION INTRAMUSCULAR; SUBCUTANEOUS
Qty: 4 | Refills: 0 | Status: DISCONTINUED | OUTPATIENT
Start: 2021-09-20 | End: 2021-09-21

## 2021-09-20 RX ORDER — ALBUMIN HUMAN 25 %
250 VIAL (ML) INTRAVENOUS
Refills: 0 | Status: COMPLETED | OUTPATIENT
Start: 2021-09-20 | End: 2021-09-20

## 2021-09-20 RX ORDER — PHENYLEPHRINE HYDROCHLORIDE 10 MG/ML
0.2 INJECTION INTRAVENOUS
Qty: 40 | Refills: 0 | Status: DISCONTINUED | OUTPATIENT
Start: 2021-09-20 | End: 2021-09-21

## 2021-09-20 RX ORDER — ALBUMIN HUMAN 25 %
250 VIAL (ML) INTRAVENOUS ONCE
Refills: 0 | Status: COMPLETED | OUTPATIENT
Start: 2021-09-20 | End: 2021-09-20

## 2021-09-20 RX ORDER — PROTAMINE SULFATE 10 MG/ML
25 AMPUL (ML) INTRAVENOUS ONCE
Refills: 0 | Status: DISCONTINUED | OUTPATIENT
Start: 2021-09-20 | End: 2021-09-20

## 2021-09-20 RX ORDER — DOBUTAMINE HCL 250MG/20ML
5 VIAL (ML) INTRAVENOUS
Qty: 500 | Refills: 0 | Status: DISCONTINUED | OUTPATIENT
Start: 2021-09-20 | End: 2021-09-22

## 2021-09-20 RX ORDER — DEXTROSE 50 % IN WATER 50 %
50 SYRINGE (ML) INTRAVENOUS
Refills: 0 | Status: DISCONTINUED | OUTPATIENT
Start: 2021-09-20 | End: 2021-09-24

## 2021-09-20 RX ORDER — CHLORHEXIDINE GLUCONATE 213 G/1000ML
1 SOLUTION TOPICAL
Refills: 0 | Status: DISCONTINUED | OUTPATIENT
Start: 2021-09-20 | End: 2021-09-24

## 2021-09-20 RX ORDER — TAMSULOSIN HYDROCHLORIDE 0.4 MG/1
0.4 CAPSULE ORAL AT BEDTIME
Refills: 0 | Status: DISCONTINUED | OUTPATIENT
Start: 2021-09-20 | End: 2021-09-25

## 2021-09-20 RX ORDER — CALCIUM GLUCONATE 100 MG/ML
2 VIAL (ML) INTRAVENOUS ONCE
Refills: 0 | Status: COMPLETED | OUTPATIENT
Start: 2021-09-20 | End: 2021-09-20

## 2021-09-20 RX ORDER — ALBUMIN HUMAN 25 %
250 VIAL (ML) INTRAVENOUS
Refills: 0 | Status: COMPLETED | OUTPATIENT
Start: 2021-09-20 | End: 2021-09-21

## 2021-09-20 RX ORDER — POTASSIUM PHOSPHATE, MONOBASIC POTASSIUM PHOSPHATE, DIBASIC 236; 224 MG/ML; MG/ML
30 INJECTION, SOLUTION INTRAVENOUS ONCE
Refills: 0 | Status: COMPLETED | OUTPATIENT
Start: 2021-09-20 | End: 2021-09-20

## 2021-09-20 RX ORDER — PROTAMINE SULFATE 10 MG/ML
25 AMPUL (ML) INTRAVENOUS ONCE
Refills: 0 | Status: COMPLETED | OUTPATIENT
Start: 2021-09-20 | End: 2021-09-20

## 2021-09-20 RX ORDER — ATORVASTATIN CALCIUM 80 MG/1
40 TABLET, FILM COATED ORAL AT BEDTIME
Refills: 0 | Status: DISCONTINUED | OUTPATIENT
Start: 2021-09-20 | End: 2021-09-25

## 2021-09-20 RX ORDER — FENTANYL CITRATE 50 UG/ML
25 INJECTION INTRAVENOUS ONCE
Refills: 0 | Status: DISCONTINUED | OUTPATIENT
Start: 2021-09-20 | End: 2021-09-20

## 2021-09-20 RX ORDER — PROPOFOL 10 MG/ML
10 INJECTION, EMULSION INTRAVENOUS
Qty: 1000 | Refills: 0 | Status: DISCONTINUED | OUTPATIENT
Start: 2021-09-20 | End: 2021-09-21

## 2021-09-20 RX ORDER — CHLORHEXIDINE GLUCONATE 213 G/1000ML
15 SOLUTION TOPICAL
Refills: 0 | Status: DISCONTINUED | OUTPATIENT
Start: 2021-09-20 | End: 2021-09-21

## 2021-09-20 RX ORDER — INSULIN HUMAN 100 [IU]/ML
1 INJECTION, SOLUTION SUBCUTANEOUS
Qty: 50 | Refills: 0 | Status: DISCONTINUED | OUTPATIENT
Start: 2021-09-20 | End: 2021-09-21

## 2021-09-20 RX ORDER — MAGNESIUM SULFATE 500 MG/ML
2 VIAL (ML) INJECTION ONCE
Refills: 0 | Status: COMPLETED | OUTPATIENT
Start: 2021-09-20 | End: 2021-09-20

## 2021-09-20 RX ORDER — FENTANYL CITRATE 50 UG/ML
25 INJECTION INTRAVENOUS ONCE
Refills: 0 | Status: DISCONTINUED | OUTPATIENT
Start: 2021-09-20 | End: 2021-09-21

## 2021-09-20 RX ORDER — POTASSIUM CHLORIDE 20 MEQ
20 PACKET (EA) ORAL
Refills: 0 | Status: COMPLETED | OUTPATIENT
Start: 2021-09-20 | End: 2021-09-20

## 2021-09-20 RX ORDER — ASPIRIN/CALCIUM CARB/MAGNESIUM 324 MG
81 TABLET ORAL DAILY
Refills: 0 | Status: DISCONTINUED | OUTPATIENT
Start: 2021-09-20 | End: 2021-09-25

## 2021-09-20 RX ORDER — SODIUM CHLORIDE 9 MG/ML
1000 INJECTION INTRAMUSCULAR; INTRAVENOUS; SUBCUTANEOUS
Refills: 0 | Status: DISCONTINUED | OUTPATIENT
Start: 2021-09-20 | End: 2021-09-24

## 2021-09-20 RX ORDER — DEXTROSE 50 % IN WATER 50 %
25 SYRINGE (ML) INTRAVENOUS
Refills: 0 | Status: DISCONTINUED | OUTPATIENT
Start: 2021-09-20 | End: 2021-09-24

## 2021-09-20 RX ORDER — SODIUM BICARBONATE 1 MEQ/ML
100 SYRINGE (ML) INTRAVENOUS ONCE
Refills: 0 | Status: COMPLETED | OUTPATIENT
Start: 2021-09-20 | End: 2021-09-20

## 2021-09-20 RX ORDER — CEFAZOLIN SODIUM 1 G
2000 VIAL (EA) INJECTION EVERY 8 HOURS
Refills: 0 | Status: COMPLETED | OUTPATIENT
Start: 2021-09-20 | End: 2021-09-22

## 2021-09-20 RX ORDER — BUPIVACAINE 13.3 MG/ML
20 INJECTION, SUSPENSION, LIPOSOMAL INFILTRATION ONCE
Refills: 0 | Status: DISCONTINUED | OUTPATIENT
Start: 2021-09-20 | End: 2021-09-20

## 2021-09-20 RX ADMIN — Medication 200 GRAM(S): at 20:10

## 2021-09-20 RX ADMIN — Medication 100 MILLIEQUIVALENT(S): at 17:45

## 2021-09-20 RX ADMIN — Medication 500 MILLILITER(S): at 18:28

## 2021-09-20 RX ADMIN — Medication 125 MILLILITER(S): at 14:10

## 2021-09-20 RX ADMIN — FENTANYL CITRATE 25 MICROGRAM(S): 50 INJECTION INTRAVENOUS at 13:00

## 2021-09-20 RX ADMIN — CISATRACURIUM BESYLATE 10 MILLIGRAM(S): 2 INJECTION INTRAVENOUS at 17:25

## 2021-09-20 RX ADMIN — Medication 100 MILLIEQUIVALENT(S): at 18:00

## 2021-09-20 RX ADMIN — CHLORHEXIDINE GLUCONATE 15 MILLILITER(S): 213 SOLUTION TOPICAL at 19:06

## 2021-09-20 RX ADMIN — Medication 100 MILLIGRAM(S): at 22:29

## 2021-09-20 RX ADMIN — Medication 50 GRAM(S): at 20:58

## 2021-09-20 RX ADMIN — Medication 100 MILLIEQUIVALENT(S): at 19:04

## 2021-09-20 RX ADMIN — POTASSIUM PHOSPHATE, MONOBASIC POTASSIUM PHOSPHATE, DIBASIC 83.33 MILLIMOLE(S): 236; 224 INJECTION, SOLUTION INTRAVENOUS at 23:00

## 2021-09-20 RX ADMIN — Medication 315 MILLIGRAM(S): at 13:32

## 2021-09-20 RX ADMIN — FENTANYL CITRATE 25 MICROGRAM(S): 50 INJECTION INTRAVENOUS at 21:44

## 2021-09-20 RX ADMIN — Medication 500 MILLILITER(S): at 20:58

## 2021-09-20 RX ADMIN — Medication 500 MILLILITER(S): at 19:30

## 2021-09-20 RX ADMIN — FENTANYL CITRATE 25 MICROGRAM(S): 50 INJECTION INTRAVENOUS at 13:15

## 2021-09-20 RX ADMIN — Medication 250 MILLILITER(S): at 16:36

## 2021-09-20 RX ADMIN — Medication 250 MILLILITER(S): at 13:10

## 2021-09-20 RX ADMIN — Medication 125 MILLILITER(S): at 13:48

## 2021-09-20 RX ADMIN — Medication 500 MILLILITER(S): at 15:13

## 2021-09-20 RX ADMIN — CISATRACURIUM BESYLATE 10 MILLIGRAM(S): 2 INJECTION INTRAVENOUS at 13:15

## 2021-09-20 RX ADMIN — ATORVASTATIN CALCIUM 40 MILLIGRAM(S): 80 TABLET, FILM COATED ORAL at 22:28

## 2021-09-20 RX ADMIN — FENTANYL CITRATE 25 MICROGRAM(S): 50 INJECTION INTRAVENOUS at 20:59

## 2021-09-20 RX ADMIN — Medication 500 MILLILITER(S): at 19:09

## 2021-09-20 NOTE — BRIEF OPERATIVE NOTE - COMMENTS
Arrived HD stable on Epi 0.02    Dr. Fong first assisted for the entirety of the case, including but not limited to opening, cannulation, valve repair, decannulation, and closure.

## 2021-09-20 NOTE — BRIEF OPERATIVE NOTE - NSICDXBRIEFPROCEDURE_GEN_ALL_CORE_FT
PROCEDURES:  Repair of mitral valve with prosthetic ring 20-Sep-2021 12:11:38 Minimally invasive mitral valve repair with 30mm ring Nicko Walker

## 2021-09-20 NOTE — CHART NOTE - NSCHARTNOTEFT_GEN_A_CORE
Limited echo, for pericardial effusion:    Small size pericardial effusion, no echo features of tamponade.    Full attestation to follow with attending interpretation.

## 2021-09-21 LAB
ALBUMIN SERPL ELPH-MCNC: 3.9 G/DL — SIGNIFICANT CHANGE UP (ref 3.3–5)
ALBUMIN SERPL ELPH-MCNC: 4 G/DL — SIGNIFICANT CHANGE UP (ref 3.3–5)
ALBUMIN SERPL ELPH-MCNC: 4.2 G/DL — SIGNIFICANT CHANGE UP (ref 3.3–5)
ALBUMIN SERPL ELPH-MCNC: 4.3 G/DL — SIGNIFICANT CHANGE UP (ref 3.3–5)
ALBUMIN SERPL ELPH-MCNC: 4.3 G/DL — SIGNIFICANT CHANGE UP (ref 3.3–5)
ALBUMIN SERPL ELPH-MCNC: 4.4 G/DL — SIGNIFICANT CHANGE UP (ref 3.3–5)
ALP SERPL-CCNC: 37 U/L — LOW (ref 40–120)
ALP SERPL-CCNC: 38 U/L — LOW (ref 40–120)
ALP SERPL-CCNC: 41 U/L — SIGNIFICANT CHANGE UP (ref 40–120)
ALP SERPL-CCNC: 42 U/L — SIGNIFICANT CHANGE UP (ref 40–120)
ALT FLD-CCNC: 10 U/L — SIGNIFICANT CHANGE UP (ref 10–45)
ALT FLD-CCNC: 11 U/L — SIGNIFICANT CHANGE UP (ref 10–45)
ALT FLD-CCNC: 12 U/L — SIGNIFICANT CHANGE UP (ref 10–45)
ALT FLD-CCNC: 12 U/L — SIGNIFICANT CHANGE UP (ref 10–45)
ALT FLD-CCNC: 13 U/L — SIGNIFICANT CHANGE UP (ref 10–45)
ALT FLD-CCNC: 13 U/L — SIGNIFICANT CHANGE UP (ref 10–45)
ANION GAP SERPL CALC-SCNC: 10 MMOL/L — SIGNIFICANT CHANGE UP (ref 5–17)
ANION GAP SERPL CALC-SCNC: 10 MMOL/L — SIGNIFICANT CHANGE UP (ref 5–17)
ANION GAP SERPL CALC-SCNC: 11 MMOL/L — SIGNIFICANT CHANGE UP (ref 5–17)
ANION GAP SERPL CALC-SCNC: 12 MMOL/L — SIGNIFICANT CHANGE UP (ref 5–17)
ANION GAP SERPL CALC-SCNC: 8 MMOL/L — SIGNIFICANT CHANGE UP (ref 5–17)
ANION GAP SERPL CALC-SCNC: 9 MMOL/L — SIGNIFICANT CHANGE UP (ref 5–17)
APTT BLD: 33.4 SEC — SIGNIFICANT CHANGE UP (ref 27.5–35.5)
APTT BLD: 35.7 SEC — HIGH (ref 27.5–35.5)
APTT BLD: 36.5 SEC — HIGH (ref 27.5–35.5)
APTT BLD: 38.8 SEC — HIGH (ref 27.5–35.5)
APTT BLD: 44.5 SEC — HIGH (ref 27.5–35.5)
AST SERPL-CCNC: 31 U/L — SIGNIFICANT CHANGE UP (ref 10–40)
AST SERPL-CCNC: 32 U/L — SIGNIFICANT CHANGE UP (ref 10–40)
AST SERPL-CCNC: 33 U/L — SIGNIFICANT CHANGE UP (ref 10–40)
AST SERPL-CCNC: 34 U/L — SIGNIFICANT CHANGE UP (ref 10–40)
BASE EXCESS BLDV CALC-SCNC: -1.3 MMOL/L — SIGNIFICANT CHANGE UP (ref -2–3)
BILIRUB SERPL-MCNC: 0.7 MG/DL — SIGNIFICANT CHANGE UP (ref 0.2–1.2)
BILIRUB SERPL-MCNC: 0.9 MG/DL — SIGNIFICANT CHANGE UP (ref 0.2–1.2)
BILIRUB SERPL-MCNC: 0.9 MG/DL — SIGNIFICANT CHANGE UP (ref 0.2–1.2)
BILIRUB SERPL-MCNC: 1 MG/DL — SIGNIFICANT CHANGE UP (ref 0.2–1.2)
BILIRUB SERPL-MCNC: 1.1 MG/DL — SIGNIFICANT CHANGE UP (ref 0.2–1.2)
BILIRUB SERPL-MCNC: 1.3 MG/DL — HIGH (ref 0.2–1.2)
BUN SERPL-MCNC: 12 MG/DL — SIGNIFICANT CHANGE UP (ref 7–23)
BUN SERPL-MCNC: 15 MG/DL — SIGNIFICANT CHANGE UP (ref 7–23)
BUN SERPL-MCNC: 17 MG/DL — SIGNIFICANT CHANGE UP (ref 7–23)
BUN SERPL-MCNC: 18 MG/DL — SIGNIFICANT CHANGE UP (ref 7–23)
BUN SERPL-MCNC: 19 MG/DL — SIGNIFICANT CHANGE UP (ref 7–23)
BUN SERPL-MCNC: 19 MG/DL — SIGNIFICANT CHANGE UP (ref 7–23)
CA-I SERPL-SCNC: 1.19 MMOL/L — SIGNIFICANT CHANGE UP (ref 1.15–1.33)
CALCIUM SERPL-MCNC: 8.3 MG/DL — LOW (ref 8.4–10.5)
CALCIUM SERPL-MCNC: 8.5 MG/DL — SIGNIFICANT CHANGE UP (ref 8.4–10.5)
CALCIUM SERPL-MCNC: 8.7 MG/DL — SIGNIFICANT CHANGE UP (ref 8.4–10.5)
CALCIUM SERPL-MCNC: 8.8 MG/DL — SIGNIFICANT CHANGE UP (ref 8.4–10.5)
CALCIUM SERPL-MCNC: 8.9 MG/DL — SIGNIFICANT CHANGE UP (ref 8.4–10.5)
CALCIUM SERPL-MCNC: 9.1 MG/DL — SIGNIFICANT CHANGE UP (ref 8.4–10.5)
CHLORIDE SERPL-SCNC: 105 MMOL/L — SIGNIFICANT CHANGE UP (ref 96–108)
CHLORIDE SERPL-SCNC: 106 MMOL/L — SIGNIFICANT CHANGE UP (ref 96–108)
CHLORIDE SERPL-SCNC: 106 MMOL/L — SIGNIFICANT CHANGE UP (ref 96–108)
CHLORIDE SERPL-SCNC: 107 MMOL/L — SIGNIFICANT CHANGE UP (ref 96–108)
CHLORIDE SERPL-SCNC: 109 MMOL/L — HIGH (ref 96–108)
CHLORIDE SERPL-SCNC: 109 MMOL/L — HIGH (ref 96–108)
CO2 BLDV-SCNC: 24.9 MMOL/L — SIGNIFICANT CHANGE UP (ref 22–26)
CO2 SERPL-SCNC: 22 MMOL/L — SIGNIFICANT CHANGE UP (ref 22–31)
CO2 SERPL-SCNC: 23 MMOL/L — SIGNIFICANT CHANGE UP (ref 22–31)
CO2 SERPL-SCNC: 23 MMOL/L — SIGNIFICANT CHANGE UP (ref 22–31)
CO2 SERPL-SCNC: 24 MMOL/L — SIGNIFICANT CHANGE UP (ref 22–31)
CO2 SERPL-SCNC: 24 MMOL/L — SIGNIFICANT CHANGE UP (ref 22–31)
CO2 SERPL-SCNC: 25 MMOL/L — SIGNIFICANT CHANGE UP (ref 22–31)
COVID-19 SPIKE DOMAIN AB INTERP: POSITIVE
COVID-19 SPIKE DOMAIN ANTIBODY RESULT: >250 U/ML — HIGH
CREAT SERPL-MCNC: 0.92 MG/DL — SIGNIFICANT CHANGE UP (ref 0.5–1.3)
CREAT SERPL-MCNC: 1.06 MG/DL — SIGNIFICANT CHANGE UP (ref 0.5–1.3)
CREAT SERPL-MCNC: 1.14 MG/DL — SIGNIFICANT CHANGE UP (ref 0.5–1.3)
CREAT SERPL-MCNC: 1.14 MG/DL — SIGNIFICANT CHANGE UP (ref 0.5–1.3)
CREAT SERPL-MCNC: 1.15 MG/DL — SIGNIFICANT CHANGE UP (ref 0.5–1.3)
CREAT SERPL-MCNC: 1.21 MG/DL — SIGNIFICANT CHANGE UP (ref 0.5–1.3)
GAS PNL BLDA: SIGNIFICANT CHANGE UP
GAS PNL BLDV: 129 MMOL/L — LOW (ref 136–145)
GAS PNL BLDV: SIGNIFICANT CHANGE UP
GLUCOSE BLDC GLUCOMTR-MCNC: 145 MG/DL — HIGH (ref 70–99)
GLUCOSE BLDC GLUCOMTR-MCNC: 175 MG/DL — HIGH (ref 70–99)
GLUCOSE BLDC GLUCOMTR-MCNC: 186 MG/DL — HIGH (ref 70–99)
GLUCOSE BLDC GLUCOMTR-MCNC: 196 MG/DL — HIGH (ref 70–99)
GLUCOSE BLDC GLUCOMTR-MCNC: 75 MG/DL — SIGNIFICANT CHANGE UP (ref 70–99)
GLUCOSE BLDC GLUCOMTR-MCNC: 85 MG/DL — SIGNIFICANT CHANGE UP (ref 70–99)
GLUCOSE SERPL-MCNC: 151 MG/DL — HIGH (ref 70–99)
GLUCOSE SERPL-MCNC: 176 MG/DL — HIGH (ref 70–99)
GLUCOSE SERPL-MCNC: 189 MG/DL — HIGH (ref 70–99)
GLUCOSE SERPL-MCNC: 204 MG/DL — HIGH (ref 70–99)
GLUCOSE SERPL-MCNC: 209 MG/DL — HIGH (ref 70–99)
GLUCOSE SERPL-MCNC: 82 MG/DL — SIGNIFICANT CHANGE UP (ref 70–99)
HCO3 BLDV-SCNC: 24 MMOL/L — SIGNIFICANT CHANGE UP (ref 22–29)
HCT VFR BLD CALC: 22.8 % — LOW (ref 39–50)
HCT VFR BLD CALC: 24.7 % — LOW (ref 39–50)
HCT VFR BLD CALC: 24.8 % — LOW (ref 39–50)
HCT VFR BLD CALC: 24.9 % — LOW (ref 39–50)
HCT VFR BLD CALC: 25 % — LOW (ref 39–50)
HCT VFR BLD CALC: 26.6 % — LOW (ref 39–50)
HCT VFR BLD CALC: 27.5 % — LOW (ref 39–50)
HCV AB S/CO SERPL IA: 0.05 S/CO — SIGNIFICANT CHANGE UP
HCV AB SERPL-IMP: SIGNIFICANT CHANGE UP
HGB BLD-MCNC: 7.8 G/DL — LOW (ref 13–17)
HGB BLD-MCNC: 8.4 G/DL — LOW (ref 13–17)
HGB BLD-MCNC: 8.5 G/DL — LOW (ref 13–17)
HGB BLD-MCNC: 8.6 G/DL — LOW (ref 13–17)
HGB BLD-MCNC: 8.6 G/DL — LOW (ref 13–17)
HGB BLD-MCNC: 8.9 G/DL — LOW (ref 13–17)
HGB BLD-MCNC: 9.3 G/DL — LOW (ref 13–17)
INR BLD: 1.26 — HIGH (ref 0.88–1.16)
INR BLD: 1.31 — HIGH (ref 0.88–1.16)
INR BLD: 1.36 — HIGH (ref 0.88–1.16)
INR BLD: 1.37 — HIGH (ref 0.88–1.16)
INR BLD: 1.45 — HIGH (ref 0.88–1.16)
LACTATE SERPL-SCNC: 1.5 MMOL/L — SIGNIFICANT CHANGE UP (ref 0.5–2)
LACTATE SERPL-SCNC: 1.6 MMOL/L — SIGNIFICANT CHANGE UP (ref 0.5–2)
LACTATE SERPL-SCNC: 1.8 MMOL/L — SIGNIFICANT CHANGE UP (ref 0.5–2)
LACTATE SERPL-SCNC: 2.2 MMOL/L — HIGH (ref 0.5–2)
LACTATE SERPL-SCNC: 2.3 MMOL/L — HIGH (ref 0.5–2)
MAGNESIUM SERPL-MCNC: 1.9 MG/DL — SIGNIFICANT CHANGE UP (ref 1.6–2.6)
MAGNESIUM SERPL-MCNC: 2 MG/DL — SIGNIFICANT CHANGE UP (ref 1.6–2.6)
MAGNESIUM SERPL-MCNC: 2 MG/DL — SIGNIFICANT CHANGE UP (ref 1.6–2.6)
MAGNESIUM SERPL-MCNC: 2.1 MG/DL — SIGNIFICANT CHANGE UP (ref 1.6–2.6)
MAGNESIUM SERPL-MCNC: 2.2 MG/DL — SIGNIFICANT CHANGE UP (ref 1.6–2.6)
MCHC RBC-ENTMCNC: 29.4 PG — SIGNIFICANT CHANGE UP (ref 27–34)
MCHC RBC-ENTMCNC: 29.4 PG — SIGNIFICANT CHANGE UP (ref 27–34)
MCHC RBC-ENTMCNC: 29.5 PG — SIGNIFICANT CHANGE UP (ref 27–34)
MCHC RBC-ENTMCNC: 29.5 PG — SIGNIFICANT CHANGE UP (ref 27–34)
MCHC RBC-ENTMCNC: 29.7 PG — SIGNIFICANT CHANGE UP (ref 27–34)
MCHC RBC-ENTMCNC: 30.3 PG — SIGNIFICANT CHANGE UP (ref 27–34)
MCHC RBC-ENTMCNC: 30.4 PG — SIGNIFICANT CHANGE UP (ref 27–34)
MCHC RBC-ENTMCNC: 33.5 GM/DL — SIGNIFICANT CHANGE UP (ref 32–36)
MCHC RBC-ENTMCNC: 33.8 GM/DL — SIGNIFICANT CHANGE UP (ref 32–36)
MCHC RBC-ENTMCNC: 34 GM/DL — SIGNIFICANT CHANGE UP (ref 32–36)
MCHC RBC-ENTMCNC: 34 GM/DL — SIGNIFICANT CHANGE UP (ref 32–36)
MCHC RBC-ENTMCNC: 34.2 GM/DL — SIGNIFICANT CHANGE UP (ref 32–36)
MCHC RBC-ENTMCNC: 34.5 GM/DL — SIGNIFICANT CHANGE UP (ref 32–36)
MCHC RBC-ENTMCNC: 34.7 GM/DL — SIGNIFICANT CHANGE UP (ref 32–36)
MCV RBC AUTO: 86.4 FL — SIGNIFICANT CHANGE UP (ref 80–100)
MCV RBC AUTO: 86.4 FL — SIGNIFICANT CHANGE UP (ref 80–100)
MCV RBC AUTO: 86.8 FL — SIGNIFICANT CHANGE UP (ref 80–100)
MCV RBC AUTO: 87.3 FL — SIGNIFICANT CHANGE UP (ref 80–100)
MCV RBC AUTO: 87.8 FL — SIGNIFICANT CHANGE UP (ref 80–100)
MCV RBC AUTO: 87.9 FL — SIGNIFICANT CHANGE UP (ref 80–100)
MCV RBC AUTO: 88 FL — SIGNIFICANT CHANGE UP (ref 80–100)
NRBC # BLD: 0 /100 WBCS — SIGNIFICANT CHANGE UP (ref 0–0)
PCO2 BLDV: 40 MMHG — LOW (ref 42–55)
PH BLDV: 7.38 — SIGNIFICANT CHANGE UP (ref 7.32–7.43)
PHOSPHATE SERPL-MCNC: 2.1 MG/DL — LOW (ref 2.5–4.5)
PHOSPHATE SERPL-MCNC: 2.5 MG/DL — SIGNIFICANT CHANGE UP (ref 2.5–4.5)
PHOSPHATE SERPL-MCNC: 2.9 MG/DL — SIGNIFICANT CHANGE UP (ref 2.5–4.5)
PHOSPHATE SERPL-MCNC: 3 MG/DL — SIGNIFICANT CHANGE UP (ref 2.5–4.5)
PHOSPHATE SERPL-MCNC: 4 MG/DL — SIGNIFICANT CHANGE UP (ref 2.5–4.5)
PLATELET # BLD AUTO: 101 K/UL — LOW (ref 150–400)
PLATELET # BLD AUTO: 101 K/UL — LOW (ref 150–400)
PLATELET # BLD AUTO: 85 K/UL — LOW (ref 150–400)
PLATELET # BLD AUTO: 86 K/UL — LOW (ref 150–400)
PLATELET # BLD AUTO: 92 K/UL — LOW (ref 150–400)
PLATELET # BLD AUTO: 94 K/UL — LOW (ref 150–400)
PLATELET # BLD AUTO: 98 K/UL — LOW (ref 150–400)
PO2 BLDV: 38 MMHG — SIGNIFICANT CHANGE UP (ref 25–45)
POTASSIUM BLDV-SCNC: 3.3 MMOL/L — LOW (ref 3.5–5.1)
POTASSIUM SERPL-MCNC: 3.6 MMOL/L — SIGNIFICANT CHANGE UP (ref 3.5–5.3)
POTASSIUM SERPL-MCNC: 3.6 MMOL/L — SIGNIFICANT CHANGE UP (ref 3.5–5.3)
POTASSIUM SERPL-MCNC: 3.7 MMOL/L — SIGNIFICANT CHANGE UP (ref 3.5–5.3)
POTASSIUM SERPL-MCNC: 3.8 MMOL/L — SIGNIFICANT CHANGE UP (ref 3.5–5.3)
POTASSIUM SERPL-MCNC: 4 MMOL/L — SIGNIFICANT CHANGE UP (ref 3.5–5.3)
POTASSIUM SERPL-MCNC: 4.1 MMOL/L — SIGNIFICANT CHANGE UP (ref 3.5–5.3)
POTASSIUM SERPL-SCNC: 3.6 MMOL/L — SIGNIFICANT CHANGE UP (ref 3.5–5.3)
POTASSIUM SERPL-SCNC: 3.6 MMOL/L — SIGNIFICANT CHANGE UP (ref 3.5–5.3)
POTASSIUM SERPL-SCNC: 3.7 MMOL/L — SIGNIFICANT CHANGE UP (ref 3.5–5.3)
POTASSIUM SERPL-SCNC: 3.8 MMOL/L — SIGNIFICANT CHANGE UP (ref 3.5–5.3)
POTASSIUM SERPL-SCNC: 4 MMOL/L — SIGNIFICANT CHANGE UP (ref 3.5–5.3)
POTASSIUM SERPL-SCNC: 4.1 MMOL/L — SIGNIFICANT CHANGE UP (ref 3.5–5.3)
PROT SERPL-MCNC: 5.2 G/DL — LOW (ref 6–8.3)
PROT SERPL-MCNC: 5.6 G/DL — LOW (ref 6–8.3)
PROT SERPL-MCNC: 5.6 G/DL — LOW (ref 6–8.3)
PROT SERPL-MCNC: 5.8 G/DL — LOW (ref 6–8.3)
PROT SERPL-MCNC: 5.8 G/DL — LOW (ref 6–8.3)
PROT SERPL-MCNC: 5.9 G/DL — LOW (ref 6–8.3)
PROTHROM AB SERPL-ACNC: 15 SEC — HIGH (ref 10.6–13.6)
PROTHROM AB SERPL-ACNC: 15.5 SEC — HIGH (ref 10.6–13.6)
PROTHROM AB SERPL-ACNC: 16.1 SEC — HIGH (ref 10.6–13.6)
PROTHROM AB SERPL-ACNC: 16.2 SEC — HIGH (ref 10.6–13.6)
PROTHROM AB SERPL-ACNC: 17.1 SEC — HIGH (ref 10.6–13.6)
RBC # BLD: 2.64 M/UL — LOW (ref 4.2–5.8)
RBC # BLD: 2.83 M/UL — LOW (ref 4.2–5.8)
RBC # BLD: 2.84 M/UL — LOW (ref 4.2–5.8)
RBC # BLD: 2.86 M/UL — LOW (ref 4.2–5.8)
RBC # BLD: 2.88 M/UL — LOW (ref 4.2–5.8)
RBC # BLD: 3.03 M/UL — LOW (ref 4.2–5.8)
RBC # BLD: 3.13 M/UL — LOW (ref 4.2–5.8)
RBC # FLD: 13.2 % — SIGNIFICANT CHANGE UP (ref 10.3–14.5)
RBC # FLD: 13.3 % — SIGNIFICANT CHANGE UP (ref 10.3–14.5)
RBC # FLD: 13.6 % — SIGNIFICANT CHANGE UP (ref 10.3–14.5)
RBC # FLD: 13.9 % — SIGNIFICANT CHANGE UP (ref 10.3–14.5)
RBC # FLD: 13.9 % — SIGNIFICANT CHANGE UP (ref 10.3–14.5)
RBC # FLD: 14.1 % — SIGNIFICANT CHANGE UP (ref 10.3–14.5)
RBC # FLD: 14.2 % — SIGNIFICANT CHANGE UP (ref 10.3–14.5)
SAO2 % BLDV: 74.9 % — SIGNIFICANT CHANGE UP (ref 67–88)
SARS-COV-2 IGG+IGM SERPL QL IA: >250 U/ML — HIGH
SARS-COV-2 IGG+IGM SERPL QL IA: POSITIVE
SODIUM SERPL-SCNC: 139 MMOL/L — SIGNIFICANT CHANGE UP (ref 135–145)
SODIUM SERPL-SCNC: 141 MMOL/L — SIGNIFICANT CHANGE UP (ref 135–145)
SODIUM SERPL-SCNC: 142 MMOL/L — SIGNIFICANT CHANGE UP (ref 135–145)
SODIUM SERPL-SCNC: 143 MMOL/L — SIGNIFICANT CHANGE UP (ref 135–145)
WBC # BLD: 10.06 K/UL — SIGNIFICANT CHANGE UP (ref 3.8–10.5)
WBC # BLD: 10.19 K/UL — SIGNIFICANT CHANGE UP (ref 3.8–10.5)
WBC # BLD: 6.01 K/UL — SIGNIFICANT CHANGE UP (ref 3.8–10.5)
WBC # BLD: 7.83 K/UL — SIGNIFICANT CHANGE UP (ref 3.8–10.5)
WBC # BLD: 7.86 K/UL — SIGNIFICANT CHANGE UP (ref 3.8–10.5)
WBC # BLD: 9.27 K/UL — SIGNIFICANT CHANGE UP (ref 3.8–10.5)
WBC # BLD: 9.8 K/UL — SIGNIFICANT CHANGE UP (ref 3.8–10.5)
WBC # FLD AUTO: 10.06 K/UL — SIGNIFICANT CHANGE UP (ref 3.8–10.5)
WBC # FLD AUTO: 10.19 K/UL — SIGNIFICANT CHANGE UP (ref 3.8–10.5)
WBC # FLD AUTO: 6.01 K/UL — SIGNIFICANT CHANGE UP (ref 3.8–10.5)
WBC # FLD AUTO: 7.83 K/UL — SIGNIFICANT CHANGE UP (ref 3.8–10.5)
WBC # FLD AUTO: 7.86 K/UL — SIGNIFICANT CHANGE UP (ref 3.8–10.5)
WBC # FLD AUTO: 9.27 K/UL — SIGNIFICANT CHANGE UP (ref 3.8–10.5)
WBC # FLD AUTO: 9.8 K/UL — SIGNIFICANT CHANGE UP (ref 3.8–10.5)

## 2021-09-21 PROCEDURE — 99292 CRITICAL CARE ADDL 30 MIN: CPT

## 2021-09-21 PROCEDURE — 71045 X-RAY EXAM CHEST 1 VIEW: CPT | Mod: 26,77

## 2021-09-21 PROCEDURE — 99291 CRITICAL CARE FIRST HOUR: CPT

## 2021-09-21 PROCEDURE — 93306 TTE W/DOPPLER COMPLETE: CPT | Mod: 26

## 2021-09-21 PROCEDURE — 71045 X-RAY EXAM CHEST 1 VIEW: CPT | Mod: 26

## 2021-09-21 RX ORDER — SODIUM CHLORIDE 9 MG/ML
1000 INJECTION, SOLUTION INTRAVENOUS
Refills: 0 | Status: DISCONTINUED | OUTPATIENT
Start: 2021-09-21 | End: 2021-09-25

## 2021-09-21 RX ORDER — POTASSIUM CHLORIDE 20 MEQ
40 PACKET (EA) ORAL ONCE
Refills: 0 | Status: COMPLETED | OUTPATIENT
Start: 2021-09-21 | End: 2021-09-21

## 2021-09-21 RX ORDER — POTASSIUM CHLORIDE 20 MEQ
20 PACKET (EA) ORAL ONCE
Refills: 0 | Status: COMPLETED | OUTPATIENT
Start: 2021-09-21 | End: 2021-09-21

## 2021-09-21 RX ORDER — ALBUMIN HUMAN 25 %
50 VIAL (ML) INTRAVENOUS
Refills: 0 | Status: COMPLETED | OUTPATIENT
Start: 2021-09-21 | End: 2021-09-21

## 2021-09-21 RX ORDER — ALBUMIN HUMAN 25 %
250 VIAL (ML) INTRAVENOUS ONCE
Refills: 0 | Status: COMPLETED | OUTPATIENT
Start: 2021-09-21 | End: 2021-09-21

## 2021-09-21 RX ORDER — OXYCODONE HYDROCHLORIDE 5 MG/1
5 TABLET ORAL EVERY 6 HOURS
Refills: 0 | Status: DISCONTINUED | OUTPATIENT
Start: 2021-09-21 | End: 2021-09-25

## 2021-09-21 RX ORDER — FUROSEMIDE 40 MG
20 TABLET ORAL ONCE
Refills: 0 | Status: COMPLETED | OUTPATIENT
Start: 2021-09-21 | End: 2021-09-21

## 2021-09-21 RX ORDER — POTASSIUM PHOSPHATE, MONOBASIC POTASSIUM PHOSPHATE, DIBASIC 236; 224 MG/ML; MG/ML
30 INJECTION, SOLUTION INTRAVENOUS ONCE
Refills: 0 | Status: COMPLETED | OUTPATIENT
Start: 2021-09-21 | End: 2021-09-21

## 2021-09-21 RX ORDER — POLYETHYLENE GLYCOL 3350 17 G/17G
17 POWDER, FOR SOLUTION ORAL DAILY
Refills: 0 | Status: DISCONTINUED | OUTPATIENT
Start: 2021-09-21 | End: 2021-09-25

## 2021-09-21 RX ORDER — HEPARIN SODIUM 5000 [USP'U]/ML
5000 INJECTION INTRAVENOUS; SUBCUTANEOUS EVERY 12 HOURS
Refills: 0 | Status: DISCONTINUED | OUTPATIENT
Start: 2021-09-21 | End: 2021-09-21

## 2021-09-21 RX ORDER — PANTOPRAZOLE SODIUM 20 MG/1
40 TABLET, DELAYED RELEASE ORAL
Refills: 0 | Status: DISCONTINUED | OUTPATIENT
Start: 2021-09-21 | End: 2021-09-25

## 2021-09-21 RX ORDER — DEXTROSE 50 % IN WATER 50 %
15 SYRINGE (ML) INTRAVENOUS ONCE
Refills: 0 | Status: DISCONTINUED | OUTPATIENT
Start: 2021-09-21 | End: 2021-09-25

## 2021-09-21 RX ORDER — CALCIUM GLUCONATE 100 MG/ML
2 VIAL (ML) INTRAVENOUS ONCE
Refills: 0 | Status: COMPLETED | OUTPATIENT
Start: 2021-09-21 | End: 2021-09-21

## 2021-09-21 RX ORDER — ACETAMINOPHEN 500 MG
650 TABLET ORAL EVERY 6 HOURS
Refills: 0 | Status: DISCONTINUED | OUTPATIENT
Start: 2021-09-21 | End: 2021-09-25

## 2021-09-21 RX ORDER — GLUCAGON INJECTION, SOLUTION 0.5 MG/.1ML
1 INJECTION, SOLUTION SUBCUTANEOUS ONCE
Refills: 0 | Status: DISCONTINUED | OUTPATIENT
Start: 2021-09-21 | End: 2021-09-25

## 2021-09-21 RX ORDER — SODIUM CHLORIDE 9 MG/ML
1000 INJECTION, SOLUTION INTRAVENOUS
Refills: 0 | Status: DISCONTINUED | OUTPATIENT
Start: 2021-09-21 | End: 2021-09-24

## 2021-09-21 RX ORDER — INSULIN LISPRO 100/ML
VIAL (ML) SUBCUTANEOUS
Refills: 0 | Status: DISCONTINUED | OUTPATIENT
Start: 2021-09-21 | End: 2021-09-25

## 2021-09-21 RX ORDER — PROTAMINE SULFATE 10 MG/ML
25 AMPUL (ML) INTRAVENOUS ONCE
Refills: 0 | Status: COMPLETED | OUTPATIENT
Start: 2021-09-21 | End: 2021-09-21

## 2021-09-21 RX ORDER — ACETAMINOPHEN 500 MG
1000 TABLET ORAL ONCE
Refills: 0 | Status: COMPLETED | OUTPATIENT
Start: 2021-09-21 | End: 2021-09-22

## 2021-09-21 RX ADMIN — Medication 250 MILLILITER(S): at 00:46

## 2021-09-21 RX ADMIN — Medication 100 MILLIGRAM(S): at 11:44

## 2021-09-21 RX ADMIN — Medication 2: at 11:44

## 2021-09-21 RX ADMIN — Medication 2: at 21:43

## 2021-09-21 RX ADMIN — Medication 50 MILLILITER(S): at 03:41

## 2021-09-21 RX ADMIN — PANTOPRAZOLE SODIUM 40 MILLIGRAM(S): 20 TABLET, DELAYED RELEASE ORAL at 07:39

## 2021-09-21 RX ADMIN — Medication 2: at 17:35

## 2021-09-21 RX ADMIN — SODIUM CHLORIDE 10 MILLILITER(S): 9 INJECTION INTRAMUSCULAR; INTRAVENOUS; SUBCUTANEOUS at 19:46

## 2021-09-21 RX ADMIN — FENTANYL CITRATE 25 MICROGRAM(S): 50 INJECTION INTRAVENOUS at 06:15

## 2021-09-21 RX ADMIN — Medication 125 MILLILITER(S): at 01:45

## 2021-09-21 RX ADMIN — CHLORHEXIDINE GLUCONATE 1 APPLICATION(S): 213 SOLUTION TOPICAL at 06:02

## 2021-09-21 RX ADMIN — Medication 500 MILLILITER(S): at 00:48

## 2021-09-21 RX ADMIN — Medication 250 MILLILITER(S): at 00:00

## 2021-09-21 RX ADMIN — TAMSULOSIN HYDROCHLORIDE 0.4 MILLIGRAM(S): 0.4 CAPSULE ORAL at 21:26

## 2021-09-21 RX ADMIN — Medication 40 MILLIEQUIVALENT(S): at 17:35

## 2021-09-21 RX ADMIN — POLYETHYLENE GLYCOL 3350 17 GRAM(S): 17 POWDER, FOR SOLUTION ORAL at 13:38

## 2021-09-21 RX ADMIN — FENTANYL CITRATE 25 MICROGRAM(S): 50 INJECTION INTRAVENOUS at 05:56

## 2021-09-21 RX ADMIN — Medication 50 MILLILITER(S): at 03:21

## 2021-09-21 RX ADMIN — Medication 12.9 MICROGRAM(S)/KG/MIN: at 17:45

## 2021-09-21 RX ADMIN — Medication 20 MILLIEQUIVALENT(S): at 23:25

## 2021-09-21 RX ADMIN — Medication 20 MILLIGRAM(S): at 06:02

## 2021-09-21 RX ADMIN — Medication 100 MILLIGRAM(S): at 19:45

## 2021-09-21 RX ADMIN — ATORVASTATIN CALCIUM 40 MILLIGRAM(S): 80 TABLET, FILM COATED ORAL at 21:26

## 2021-09-21 RX ADMIN — Medication 200 GRAM(S): at 13:23

## 2021-09-21 RX ADMIN — Medication 125 MILLILITER(S): at 02:09

## 2021-09-21 RX ADMIN — Medication 81 MILLIGRAM(S): at 11:44

## 2021-09-21 RX ADMIN — Medication 315 MILLIGRAM(S): at 13:22

## 2021-09-21 RX ADMIN — Medication 125 MILLILITER(S): at 13:24

## 2021-09-22 LAB
ALBUMIN SERPL ELPH-MCNC: 3.5 G/DL — SIGNIFICANT CHANGE UP (ref 3.3–5)
ALP SERPL-CCNC: 42 U/L — SIGNIFICANT CHANGE UP (ref 40–120)
ALT FLD-CCNC: 11 U/L — SIGNIFICANT CHANGE UP (ref 10–45)
ANION GAP SERPL CALC-SCNC: 7 MMOL/L — SIGNIFICANT CHANGE UP (ref 5–17)
ANION GAP SERPL CALC-SCNC: 8 MMOL/L — SIGNIFICANT CHANGE UP (ref 5–17)
APTT BLD: 33.2 SEC — SIGNIFICANT CHANGE UP (ref 27.5–35.5)
APTT BLD: 41.6 SEC — HIGH (ref 27.5–35.5)
AST SERPL-CCNC: 26 U/L — SIGNIFICANT CHANGE UP (ref 10–40)
BILIRUB SERPL-MCNC: 0.9 MG/DL — SIGNIFICANT CHANGE UP (ref 0.2–1.2)
BUN SERPL-MCNC: 18 MG/DL — SIGNIFICANT CHANGE UP (ref 7–23)
BUN SERPL-MCNC: 19 MG/DL — SIGNIFICANT CHANGE UP (ref 7–23)
CALCIUM SERPL-MCNC: 8.4 MG/DL — SIGNIFICANT CHANGE UP (ref 8.4–10.5)
CALCIUM SERPL-MCNC: 8.5 MG/DL — SIGNIFICANT CHANGE UP (ref 8.4–10.5)
CHLORIDE SERPL-SCNC: 107 MMOL/L — SIGNIFICANT CHANGE UP (ref 96–108)
CHLORIDE SERPL-SCNC: 108 MMOL/L — SIGNIFICANT CHANGE UP (ref 96–108)
CO2 SERPL-SCNC: 23 MMOL/L — SIGNIFICANT CHANGE UP (ref 22–31)
CO2 SERPL-SCNC: 24 MMOL/L — SIGNIFICANT CHANGE UP (ref 22–31)
CREAT SERPL-MCNC: 0.99 MG/DL — SIGNIFICANT CHANGE UP (ref 0.5–1.3)
CREAT SERPL-MCNC: 1.13 MG/DL — SIGNIFICANT CHANGE UP (ref 0.5–1.3)
GAS PNL BLDA: SIGNIFICANT CHANGE UP
GAS PNL BLDA: SIGNIFICANT CHANGE UP
GLUCOSE BLDC GLUCOMTR-MCNC: 166 MG/DL — HIGH (ref 70–99)
GLUCOSE BLDC GLUCOMTR-MCNC: 167 MG/DL — HIGH (ref 70–99)
GLUCOSE BLDC GLUCOMTR-MCNC: 207 MG/DL — HIGH (ref 70–99)
GLUCOSE BLDC GLUCOMTR-MCNC: 240 MG/DL — HIGH (ref 70–99)
GLUCOSE SERPL-MCNC: 178 MG/DL — HIGH (ref 70–99)
GLUCOSE SERPL-MCNC: 238 MG/DL — HIGH (ref 70–99)
HCT VFR BLD CALC: 25.3 % — LOW (ref 39–50)
HCT VFR BLD CALC: 27.3 % — LOW (ref 39–50)
HGB BLD-MCNC: 8.7 G/DL — LOW (ref 13–17)
HGB BLD-MCNC: 9.2 G/DL — LOW (ref 13–17)
INR BLD: 1.17 — HIGH (ref 0.88–1.16)
INR BLD: 1.32 — HIGH (ref 0.88–1.16)
MAGNESIUM SERPL-MCNC: 1.9 MG/DL — SIGNIFICANT CHANGE UP (ref 1.6–2.6)
MAGNESIUM SERPL-MCNC: 2.1 MG/DL — SIGNIFICANT CHANGE UP (ref 1.6–2.6)
MAGNESIUM SERPL-MCNC: 2.1 MG/DL — SIGNIFICANT CHANGE UP (ref 1.6–2.6)
MCHC RBC-ENTMCNC: 29.4 PG — SIGNIFICANT CHANGE UP (ref 27–34)
MCHC RBC-ENTMCNC: 30.1 PG — SIGNIFICANT CHANGE UP (ref 27–34)
MCHC RBC-ENTMCNC: 33.7 GM/DL — SIGNIFICANT CHANGE UP (ref 32–36)
MCHC RBC-ENTMCNC: 34.4 GM/DL — SIGNIFICANT CHANGE UP (ref 32–36)
MCV RBC AUTO: 87.2 FL — SIGNIFICANT CHANGE UP (ref 80–100)
MCV RBC AUTO: 87.5 FL — SIGNIFICANT CHANGE UP (ref 80–100)
NRBC # BLD: 0 /100 WBCS — SIGNIFICANT CHANGE UP (ref 0–0)
NRBC # BLD: 0 /100 WBCS — SIGNIFICANT CHANGE UP (ref 0–0)
PHOSPHATE SERPL-MCNC: 1.6 MG/DL — LOW (ref 2.5–4.5)
PHOSPHATE SERPL-MCNC: 2.7 MG/DL — SIGNIFICANT CHANGE UP (ref 2.5–4.5)
PLATELET # BLD AUTO: 83 K/UL — LOW (ref 150–400)
PLATELET # BLD AUTO: 85 K/UL — LOW (ref 150–400)
POTASSIUM SERPL-MCNC: 3.6 MMOL/L — SIGNIFICANT CHANGE UP (ref 3.5–5.3)
POTASSIUM SERPL-MCNC: 3.6 MMOL/L — SIGNIFICANT CHANGE UP (ref 3.5–5.3)
POTASSIUM SERPL-MCNC: 3.8 MMOL/L — SIGNIFICANT CHANGE UP (ref 3.5–5.3)
POTASSIUM SERPL-SCNC: 3.6 MMOL/L — SIGNIFICANT CHANGE UP (ref 3.5–5.3)
POTASSIUM SERPL-SCNC: 3.6 MMOL/L — SIGNIFICANT CHANGE UP (ref 3.5–5.3)
POTASSIUM SERPL-SCNC: 3.8 MMOL/L — SIGNIFICANT CHANGE UP (ref 3.5–5.3)
PROT SERPL-MCNC: 5.3 G/DL — LOW (ref 6–8.3)
PROTHROM AB SERPL-ACNC: 13.9 SEC — HIGH (ref 10.6–13.6)
PROTHROM AB SERPL-ACNC: 15.6 SEC — HIGH (ref 10.6–13.6)
RBC # BLD: 2.89 M/UL — LOW (ref 4.2–5.8)
RBC # BLD: 3.13 M/UL — LOW (ref 4.2–5.8)
RBC # FLD: 14 % — SIGNIFICANT CHANGE UP (ref 10.3–14.5)
RBC # FLD: 14 % — SIGNIFICANT CHANGE UP (ref 10.3–14.5)
SODIUM SERPL-SCNC: 138 MMOL/L — SIGNIFICANT CHANGE UP (ref 135–145)
SODIUM SERPL-SCNC: 139 MMOL/L — SIGNIFICANT CHANGE UP (ref 135–145)
WBC # BLD: 9.63 K/UL — SIGNIFICANT CHANGE UP (ref 3.8–10.5)
WBC # BLD: 9.92 K/UL — SIGNIFICANT CHANGE UP (ref 3.8–10.5)
WBC # FLD AUTO: 9.63 K/UL — SIGNIFICANT CHANGE UP (ref 3.8–10.5)
WBC # FLD AUTO: 9.92 K/UL — SIGNIFICANT CHANGE UP (ref 3.8–10.5)

## 2021-09-22 PROCEDURE — 99292 CRITICAL CARE ADDL 30 MIN: CPT

## 2021-09-22 PROCEDURE — 71045 X-RAY EXAM CHEST 1 VIEW: CPT | Mod: 26,77

## 2021-09-22 PROCEDURE — 71045 X-RAY EXAM CHEST 1 VIEW: CPT | Mod: 26

## 2021-09-22 PROCEDURE — 99291 CRITICAL CARE FIRST HOUR: CPT

## 2021-09-22 RX ORDER — CEFAZOLIN SODIUM 1 G
VIAL (EA) INJECTION
Refills: 0 | Status: DISCONTINUED | OUTPATIENT
Start: 2021-09-22 | End: 2021-09-22

## 2021-09-22 RX ORDER — METOPROLOL TARTRATE 50 MG
25 TABLET ORAL EVERY 12 HOURS
Refills: 0 | Status: DISCONTINUED | OUTPATIENT
Start: 2021-09-22 | End: 2021-09-25

## 2021-09-22 RX ORDER — FUROSEMIDE 40 MG
20 TABLET ORAL ONCE
Refills: 0 | Status: COMPLETED | OUTPATIENT
Start: 2021-09-22 | End: 2021-09-22

## 2021-09-22 RX ORDER — CEFAZOLIN SODIUM 1 G
2000 VIAL (EA) INJECTION ONCE
Refills: 0 | Status: DISCONTINUED | OUTPATIENT
Start: 2021-09-22 | End: 2021-09-22

## 2021-09-22 RX ORDER — CEFAZOLIN SODIUM 1 G
2000 VIAL (EA) INJECTION EVERY 8 HOURS
Refills: 0 | Status: DISCONTINUED | OUTPATIENT
Start: 2021-09-22 | End: 2021-09-22

## 2021-09-22 RX ORDER — POTASSIUM CHLORIDE 20 MEQ
20 PACKET (EA) ORAL
Refills: 0 | Status: COMPLETED | OUTPATIENT
Start: 2021-09-22 | End: 2021-09-22

## 2021-09-22 RX ORDER — HEPARIN SODIUM 5000 [USP'U]/ML
2500 INJECTION INTRAVENOUS; SUBCUTANEOUS EVERY 12 HOURS
Refills: 0 | Status: DISCONTINUED | OUTPATIENT
Start: 2021-09-22 | End: 2021-09-25

## 2021-09-22 RX ORDER — METOPROLOL TARTRATE 50 MG
12.5 TABLET ORAL EVERY 12 HOURS
Refills: 0 | Status: DISCONTINUED | OUTPATIENT
Start: 2021-09-22 | End: 2021-09-22

## 2021-09-22 RX ORDER — POTASSIUM CHLORIDE 20 MEQ
20 PACKET (EA) ORAL ONCE
Refills: 0 | Status: COMPLETED | OUTPATIENT
Start: 2021-09-22 | End: 2021-09-22

## 2021-09-22 RX ORDER — CALCIUM GLUCONATE 100 MG/ML
2 VIAL (ML) INTRAVENOUS ONCE
Refills: 0 | Status: COMPLETED | OUTPATIENT
Start: 2021-09-22 | End: 2021-09-22

## 2021-09-22 RX ORDER — MAGNESIUM SULFATE 500 MG/ML
2 VIAL (ML) INJECTION ONCE
Refills: 0 | Status: COMPLETED | OUTPATIENT
Start: 2021-09-22 | End: 2021-09-22

## 2021-09-22 RX ADMIN — POLYETHYLENE GLYCOL 3350 17 GRAM(S): 17 POWDER, FOR SOLUTION ORAL at 11:07

## 2021-09-22 RX ADMIN — Medication 200 GRAM(S): at 04:32

## 2021-09-22 RX ADMIN — OXYCODONE HYDROCHLORIDE 5 MILLIGRAM(S): 5 TABLET ORAL at 21:47

## 2021-09-22 RX ADMIN — OXYCODONE HYDROCHLORIDE 5 MILLIGRAM(S): 5 TABLET ORAL at 18:11

## 2021-09-22 RX ADMIN — Medication 12.5 MILLIGRAM(S): at 13:07

## 2021-09-22 RX ADMIN — Medication 100 MILLIEQUIVALENT(S): at 19:07

## 2021-09-22 RX ADMIN — Medication 25 MILLIGRAM(S): at 18:10

## 2021-09-22 RX ADMIN — Medication 50 GRAM(S): at 20:24

## 2021-09-22 RX ADMIN — OXYCODONE HYDROCHLORIDE 5 MILLIGRAM(S): 5 TABLET ORAL at 08:59

## 2021-09-22 RX ADMIN — POTASSIUM PHOSPHATE, MONOBASIC POTASSIUM PHOSPHATE, DIBASIC 83.33 MILLIMOLE(S): 236; 224 INJECTION, SOLUTION INTRAVENOUS at 00:06

## 2021-09-22 RX ADMIN — Medication 100 MILLIEQUIVALENT(S): at 21:40

## 2021-09-22 RX ADMIN — Medication 2: at 21:40

## 2021-09-22 RX ADMIN — Medication 100 MILLIGRAM(S): at 03:21

## 2021-09-22 RX ADMIN — ATORVASTATIN CALCIUM 40 MILLIGRAM(S): 80 TABLET, FILM COATED ORAL at 21:38

## 2021-09-22 RX ADMIN — Medication 4: at 11:08

## 2021-09-22 RX ADMIN — Medication 1000 MILLIGRAM(S): at 03:16

## 2021-09-22 RX ADMIN — Medication 100 MILLIEQUIVALENT(S): at 15:48

## 2021-09-22 RX ADMIN — Medication 100 MILLIGRAM(S): at 11:07

## 2021-09-22 RX ADMIN — Medication 20 MILLIGRAM(S): at 15:48

## 2021-09-22 RX ADMIN — OXYCODONE HYDROCHLORIDE 5 MILLIGRAM(S): 5 TABLET ORAL at 15:48

## 2021-09-22 RX ADMIN — PANTOPRAZOLE SODIUM 40 MILLIGRAM(S): 20 TABLET, DELAYED RELEASE ORAL at 06:40

## 2021-09-22 RX ADMIN — TAMSULOSIN HYDROCHLORIDE 0.4 MILLIGRAM(S): 0.4 CAPSULE ORAL at 21:38

## 2021-09-22 RX ADMIN — Medication 81 MILLIGRAM(S): at 11:08

## 2021-09-22 RX ADMIN — Medication 20 MILLIGRAM(S): at 10:11

## 2021-09-22 RX ADMIN — CHLORHEXIDINE GLUCONATE 1 APPLICATION(S): 213 SOLUTION TOPICAL at 06:07

## 2021-09-22 RX ADMIN — OXYCODONE HYDROCHLORIDE 5 MILLIGRAM(S): 5 TABLET ORAL at 22:30

## 2021-09-22 RX ADMIN — Medication 2: at 06:41

## 2021-09-22 RX ADMIN — OXYCODONE HYDROCHLORIDE 5 MILLIGRAM(S): 5 TABLET ORAL at 09:37

## 2021-09-22 RX ADMIN — HEPARIN SODIUM 2500 UNIT(S): 5000 INJECTION INTRAVENOUS; SUBCUTANEOUS at 18:10

## 2021-09-22 RX ADMIN — Medication 400 MILLIGRAM(S): at 02:26

## 2021-09-22 NOTE — PHYSICAL THERAPY INITIAL EVALUATION ADULT - TRANSFER SKILLS, REHAB EVAL
independent/needs device Methotrexate Pregnancy And Lactation Text: This medication is Pregnancy Category X and is known to cause fetal harm. This medication is excreted in breast milk.

## 2021-09-22 NOTE — PHYSICAL THERAPY INITIAL EVALUATION ADULT - PERTINENT HX OF CURRENT PROBLEM, REHAB EVAL
71 year old male with severe mitral valve insufficiency. He presented to his cardiologist, Dr. Fabian, for further evaluation of mitral regurgitation and cardiac clearance prior to knee replacement surgery. He was seen in the outpatient office for surgical consult with Dr. Jarquin and now presents for elective surgery. NYHA III.

## 2021-09-22 NOTE — PHYSICAL THERAPY INITIAL EVALUATION ADULT - GAIT DEVIATIONS NOTED, PT EVAL
fairly steady initially with verbal cues for sequencing and to decreased flexed posture, 1 seated rest break, decreased bilateral knee ext as patient fatigued, bilateral genu varum left>right, left foot supinated during gait

## 2021-09-22 NOTE — PHYSICAL THERAPY INITIAL EVALUATION ADULT - ADDITIONAL COMMENTS
patient was ambulating at home without an assistive device, was using a cane or rolling walker in the community. Was limited by bilateral knee OA, awaiting orthopedic sx.

## 2021-09-22 NOTE — PHYSICAL THERAPY INITIAL EVALUATION ADULT - IMPAIRMENTS FOUND, PT EVAL
aerobic capacity/endurance/gait, locomotion, and balance/joint integrity and mobility/posture/ventilation and respiration/gas exchange

## 2021-09-22 NOTE — PHYSICAL THERAPY INITIAL EVALUATION ADULT - IMPAIRED TRANSFERS: SIT/STAND, REHAB EVAL
verbal cues provided for safety awareness/impaired balance/pain/impaired postural control/decreased strength

## 2021-09-22 NOTE — PHYSICAL THERAPY INITIAL EVALUATION ADULT - GENERAL OBSERVATIONS, REHAB EVAL
patient received seated out of bed with no acute distress. +EKG +chest tube to wall suction x 2 +juliana +central line +temporary pacemaker +NC 6L/min +nye

## 2021-09-23 LAB
ALBUMIN SERPL ELPH-MCNC: 3.7 G/DL — SIGNIFICANT CHANGE UP (ref 3.3–5)
ALP SERPL-CCNC: 59 U/L — SIGNIFICANT CHANGE UP (ref 40–120)
ALT FLD-CCNC: 12 U/L — SIGNIFICANT CHANGE UP (ref 10–45)
ANION GAP SERPL CALC-SCNC: 10 MMOL/L — SIGNIFICANT CHANGE UP (ref 5–17)
ANION GAP SERPL CALC-SCNC: 7 MMOL/L — SIGNIFICANT CHANGE UP (ref 5–17)
APTT BLD: 28.9 SEC — SIGNIFICANT CHANGE UP (ref 27.5–35.5)
APTT BLD: 31.7 SEC — SIGNIFICANT CHANGE UP (ref 27.5–35.5)
AST SERPL-CCNC: 24 U/L — SIGNIFICANT CHANGE UP (ref 10–40)
BILIRUB SERPL-MCNC: 1 MG/DL — SIGNIFICANT CHANGE UP (ref 0.2–1.2)
BUN SERPL-MCNC: 18 MG/DL — SIGNIFICANT CHANGE UP (ref 7–23)
BUN SERPL-MCNC: 21 MG/DL — SIGNIFICANT CHANGE UP (ref 7–23)
CALCIUM SERPL-MCNC: 7.4 MG/DL — LOW (ref 8.4–10.5)
CALCIUM SERPL-MCNC: 8.2 MG/DL — LOW (ref 8.4–10.5)
CHLORIDE SERPL-SCNC: 106 MMOL/L — SIGNIFICANT CHANGE UP (ref 96–108)
CHLORIDE SERPL-SCNC: 109 MMOL/L — HIGH (ref 96–108)
CO2 SERPL-SCNC: 21 MMOL/L — LOW (ref 22–31)
CO2 SERPL-SCNC: 22 MMOL/L — SIGNIFICANT CHANGE UP (ref 22–31)
CREAT SERPL-MCNC: 1.01 MG/DL — SIGNIFICANT CHANGE UP (ref 0.5–1.3)
CREAT SERPL-MCNC: 1.02 MG/DL — SIGNIFICANT CHANGE UP (ref 0.5–1.3)
GAS PNL BLDA: SIGNIFICANT CHANGE UP
GAS PNL BLDA: SIGNIFICANT CHANGE UP
GLUCOSE BLDC GLUCOMTR-MCNC: 139 MG/DL — HIGH (ref 70–99)
GLUCOSE BLDC GLUCOMTR-MCNC: 212 MG/DL — HIGH (ref 70–99)
GLUCOSE BLDC GLUCOMTR-MCNC: 225 MG/DL — HIGH (ref 70–99)
GLUCOSE SERPL-MCNC: 145 MG/DL — HIGH (ref 70–99)
GLUCOSE SERPL-MCNC: 168 MG/DL — HIGH (ref 70–99)
HCT VFR BLD CALC: 25.5 % — LOW (ref 39–50)
HCT VFR BLD CALC: 27.8 % — LOW (ref 39–50)
HGB BLD-MCNC: 8.6 G/DL — LOW (ref 13–17)
HGB BLD-MCNC: 9.2 G/DL — LOW (ref 13–17)
INR BLD: 0.99 — SIGNIFICANT CHANGE UP (ref 0.88–1.16)
INR BLD: 1.11 — SIGNIFICANT CHANGE UP (ref 0.88–1.16)
LACTATE SERPL-SCNC: 1.2 MMOL/L — SIGNIFICANT CHANGE UP (ref 0.5–2)
MAGNESIUM SERPL-MCNC: 2.1 MG/DL — SIGNIFICANT CHANGE UP (ref 1.6–2.6)
MAGNESIUM SERPL-MCNC: 2.1 MG/DL — SIGNIFICANT CHANGE UP (ref 1.6–2.6)
MAGNESIUM SERPL-MCNC: 2.2 MG/DL — SIGNIFICANT CHANGE UP (ref 1.6–2.6)
MCHC RBC-ENTMCNC: 29.1 PG — SIGNIFICANT CHANGE UP (ref 27–34)
MCHC RBC-ENTMCNC: 29.8 PG — SIGNIFICANT CHANGE UP (ref 27–34)
MCHC RBC-ENTMCNC: 33.1 GM/DL — SIGNIFICANT CHANGE UP (ref 32–36)
MCHC RBC-ENTMCNC: 33.7 GM/DL — SIGNIFICANT CHANGE UP (ref 32–36)
MCV RBC AUTO: 88 FL — SIGNIFICANT CHANGE UP (ref 80–100)
MCV RBC AUTO: 88.2 FL — SIGNIFICANT CHANGE UP (ref 80–100)
NRBC # BLD: 0 /100 WBCS — SIGNIFICANT CHANGE UP (ref 0–0)
NRBC # BLD: 0 /100 WBCS — SIGNIFICANT CHANGE UP (ref 0–0)
PHOSPHATE SERPL-MCNC: 1.5 MG/DL — LOW (ref 2.5–4.5)
PHOSPHATE SERPL-MCNC: 2.1 MG/DL — LOW (ref 2.5–4.5)
PLATELET # BLD AUTO: 107 K/UL — LOW (ref 150–400)
PLATELET # BLD AUTO: 89 K/UL — LOW (ref 150–400)
POTASSIUM SERPL-MCNC: 3.6 MMOL/L — SIGNIFICANT CHANGE UP (ref 3.5–5.3)
POTASSIUM SERPL-MCNC: 3.8 MMOL/L — SIGNIFICANT CHANGE UP (ref 3.5–5.3)
POTASSIUM SERPL-MCNC: 4 MMOL/L — SIGNIFICANT CHANGE UP (ref 3.5–5.3)
POTASSIUM SERPL-SCNC: 3.6 MMOL/L — SIGNIFICANT CHANGE UP (ref 3.5–5.3)
POTASSIUM SERPL-SCNC: 3.8 MMOL/L — SIGNIFICANT CHANGE UP (ref 3.5–5.3)
POTASSIUM SERPL-SCNC: 4 MMOL/L — SIGNIFICANT CHANGE UP (ref 3.5–5.3)
PROT SERPL-MCNC: 6.1 G/DL — SIGNIFICANT CHANGE UP (ref 6–8.3)
PROTHROM AB SERPL-ACNC: 11.9 SEC — SIGNIFICANT CHANGE UP (ref 10.6–13.6)
PROTHROM AB SERPL-ACNC: 13.2 SEC — SIGNIFICANT CHANGE UP (ref 10.6–13.6)
RBC # BLD: 2.89 M/UL — LOW (ref 4.2–5.8)
RBC # BLD: 3.16 M/UL — LOW (ref 4.2–5.8)
RBC # FLD: 13.5 % — SIGNIFICANT CHANGE UP (ref 10.3–14.5)
RBC # FLD: 13.6 % — SIGNIFICANT CHANGE UP (ref 10.3–14.5)
SODIUM SERPL-SCNC: 137 MMOL/L — SIGNIFICANT CHANGE UP (ref 135–145)
SODIUM SERPL-SCNC: 138 MMOL/L — SIGNIFICANT CHANGE UP (ref 135–145)
WBC # BLD: 9.4 K/UL — SIGNIFICANT CHANGE UP (ref 3.8–10.5)
WBC # BLD: 9.97 K/UL — SIGNIFICANT CHANGE UP (ref 3.8–10.5)
WBC # FLD AUTO: 9.4 K/UL — SIGNIFICANT CHANGE UP (ref 3.8–10.5)
WBC # FLD AUTO: 9.97 K/UL — SIGNIFICANT CHANGE UP (ref 3.8–10.5)

## 2021-09-23 PROCEDURE — 71045 X-RAY EXAM CHEST 1 VIEW: CPT | Mod: 26

## 2021-09-23 PROCEDURE — 99292 CRITICAL CARE ADDL 30 MIN: CPT

## 2021-09-23 PROCEDURE — 71250 CT THORAX DX C-: CPT | Mod: 26

## 2021-09-23 PROCEDURE — 93010 ELECTROCARDIOGRAM REPORT: CPT

## 2021-09-23 PROCEDURE — 99291 CRITICAL CARE FIRST HOUR: CPT

## 2021-09-23 PROCEDURE — 71045 X-RAY EXAM CHEST 1 VIEW: CPT | Mod: 26,77

## 2021-09-23 RX ORDER — AMIODARONE HYDROCHLORIDE 400 MG/1
150 TABLET ORAL ONCE
Refills: 0 | Status: COMPLETED | OUTPATIENT
Start: 2021-09-23 | End: 2021-09-23

## 2021-09-23 RX ORDER — POTASSIUM CHLORIDE 20 MEQ
40 PACKET (EA) ORAL ONCE
Refills: 0 | Status: COMPLETED | OUTPATIENT
Start: 2021-09-23 | End: 2021-09-23

## 2021-09-23 RX ORDER — FUROSEMIDE 40 MG
20 TABLET ORAL ONCE
Refills: 0 | Status: COMPLETED | OUTPATIENT
Start: 2021-09-23 | End: 2021-09-23

## 2021-09-23 RX ORDER — AMIODARONE HYDROCHLORIDE 400 MG/1
400 TABLET ORAL EVERY 8 HOURS
Refills: 0 | Status: DISCONTINUED | OUTPATIENT
Start: 2021-09-23 | End: 2021-09-25

## 2021-09-23 RX ORDER — AMIODARONE HYDROCHLORIDE 400 MG/1
TABLET ORAL
Refills: 0 | Status: DISCONTINUED | OUTPATIENT
Start: 2021-09-23 | End: 2021-09-25

## 2021-09-23 RX ORDER — AMIODARONE HYDROCHLORIDE 400 MG/1
200 TABLET ORAL DAILY
Refills: 0 | Status: CANCELLED | OUTPATIENT
Start: 2021-09-27 | End: 2021-09-25

## 2021-09-23 RX ORDER — POTASSIUM CHLORIDE 20 MEQ
20 PACKET (EA) ORAL ONCE
Refills: 0 | Status: COMPLETED | OUTPATIENT
Start: 2021-09-23 | End: 2021-09-23

## 2021-09-23 RX ORDER — ALBUMIN HUMAN 25 %
250 VIAL (ML) INTRAVENOUS ONCE
Refills: 0 | Status: COMPLETED | OUTPATIENT
Start: 2021-09-23 | End: 2021-09-23

## 2021-09-23 RX ORDER — POTASSIUM PHOSPHATE, MONOBASIC POTASSIUM PHOSPHATE, DIBASIC 236; 224 MG/ML; MG/ML
30 INJECTION, SOLUTION INTRAVENOUS ONCE
Refills: 0 | Status: COMPLETED | OUTPATIENT
Start: 2021-09-23 | End: 2021-09-23

## 2021-09-23 RX ADMIN — POTASSIUM PHOSPHATE, MONOBASIC POTASSIUM PHOSPHATE, DIBASIC 83.33 MILLIMOLE(S): 236; 224 INJECTION, SOLUTION INTRAVENOUS at 06:04

## 2021-09-23 RX ADMIN — Medication 100 MILLIEQUIVALENT(S): at 16:00

## 2021-09-23 RX ADMIN — Medication 25 MILLIGRAM(S): at 18:24

## 2021-09-23 RX ADMIN — OXYCODONE HYDROCHLORIDE 5 MILLIGRAM(S): 5 TABLET ORAL at 04:30

## 2021-09-23 RX ADMIN — Medication 4: at 16:59

## 2021-09-23 RX ADMIN — TAMSULOSIN HYDROCHLORIDE 0.4 MILLIGRAM(S): 0.4 CAPSULE ORAL at 21:05

## 2021-09-23 RX ADMIN — HEPARIN SODIUM 2500 UNIT(S): 5000 INJECTION INTRAVENOUS; SUBCUTANEOUS at 18:22

## 2021-09-23 RX ADMIN — Medication 4: at 21:05

## 2021-09-23 RX ADMIN — HEPARIN SODIUM 2500 UNIT(S): 5000 INJECTION INTRAVENOUS; SUBCUTANEOUS at 05:42

## 2021-09-23 RX ADMIN — OXYCODONE HYDROCHLORIDE 5 MILLIGRAM(S): 5 TABLET ORAL at 03:56

## 2021-09-23 RX ADMIN — AMIODARONE HYDROCHLORIDE 600 MILLIGRAM(S): 400 TABLET ORAL at 16:10

## 2021-09-23 RX ADMIN — Medication 40 MILLIEQUIVALENT(S): at 03:17

## 2021-09-23 RX ADMIN — AMIODARONE HYDROCHLORIDE 400 MILLIGRAM(S): 400 TABLET ORAL at 21:05

## 2021-09-23 RX ADMIN — Medication 10 MILLIGRAM(S): at 11:38

## 2021-09-23 RX ADMIN — ATORVASTATIN CALCIUM 40 MILLIGRAM(S): 80 TABLET, FILM COATED ORAL at 21:05

## 2021-09-23 RX ADMIN — Medication 125 MILLILITER(S): at 15:25

## 2021-09-23 RX ADMIN — AMIODARONE HYDROCHLORIDE 600 MILLIGRAM(S): 400 TABLET ORAL at 19:38

## 2021-09-23 RX ADMIN — Medication 25 MILLIGRAM(S): at 05:42

## 2021-09-23 RX ADMIN — AMIODARONE HYDROCHLORIDE 600 MILLIGRAM(S): 400 TABLET ORAL at 15:20

## 2021-09-23 RX ADMIN — PANTOPRAZOLE SODIUM 40 MILLIGRAM(S): 20 TABLET, DELAYED RELEASE ORAL at 05:41

## 2021-09-23 RX ADMIN — CHLORHEXIDINE GLUCONATE 1 APPLICATION(S): 213 SOLUTION TOPICAL at 05:42

## 2021-09-23 RX ADMIN — Medication 20 MILLIGRAM(S): at 11:37

## 2021-09-23 RX ADMIN — POLYETHYLENE GLYCOL 3350 17 GRAM(S): 17 POWDER, FOR SOLUTION ORAL at 11:38

## 2021-09-23 RX ADMIN — Medication 81 MILLIGRAM(S): at 11:37

## 2021-09-23 NOTE — CHART NOTE - NSCHARTNOTEFT_GEN_A_CORE
Anterior CT removed on right side, after clamp trial as discussed with Dr. Jarquin. Tie down and occlusive dressing applied.

## 2021-09-23 NOTE — DIETITIAN INITIAL EVALUATION ADULT. - ADD RECOMMEND
1. Continue with current diet order 2. Encourage pt to meet nutritional needs as able 3. Encourage adherence to diet education (provide diet ed reinforcement as able) 4. Bowel/pain regimen per team

## 2021-09-23 NOTE — DIETITIAN INITIAL EVALUATION ADULT. - OTHER INFO
72 y/o Thai/English speaking male, former smoker, presents w/ PMHx of HTN, HLD, DM, tonsil cancer s/p surgery + radiation in 2019 and CAD (s/p PCI 2018) with severe mitral valve insufficiency. He presented to his cardiologist, Dr. Fabian, for further evaluation of mitral regurgitation and cardiac clearance prior to knee replacement surgery. TTE (7/22/21): LVEF 50-55%, LV apex hypokinetic and aneurysmal, severe MR, minimal TR. Denies CP, SOB, palpitations, dizziness/syncope, abdominal pain, N/V, fever/chillls, LE edema, orthopnea, PND, exposure to COVID positive people, cough, loss of taste/smell. He has received covid vaccine x 2. 9/10/21 cardiac cath revealed patent LAD and RPDA stents. S/p mitral valve repair 9/20.     Pt seen at bedside for initial assessment on NC/HF.  services used [employer] ID: [id number].  Denies nausea/vomiting at this time. Reports last bowel movement 9/20 (per chart, last BM 9/21). Confirms NKFA. Denies change in appetite PTA; endorses 3 meals/day at home (following [place] diet at home (rice soup, beef and meats). Reports current body weight 190 pounds (consistent with dosing weight 190 pounds). Verbalizes no recent weight loss. Generalized edema 1+. No pressure ulcers documented at this time. Right thoracotomy and right groin surgical incisions per chart. Arcadio score=20. Pt noted with hypophosphatemia x 2 days; will continue to monitor. Fingersticks 9/22-9/23: 139-240 mg/dL; ISS ordered. Observed pt with no overt signs of muscle or fat wasting. Based on ASPEN guidelines, pt does not meet criteria for malnutrition at this time. Discussed current diet orde DASH/TLC Consistent Carbohydrate Diet; provided pt with diet education regarding importance of meeting nutritional needs post operatively ; amenable to education. Provided pt with in depth diet education on DASH/TLC Consistent Carbohydrate diet (handouts provided in English; pt reports daughter will translate). Pt reports poor appetite during hospital stay able to complete ~50% of tray; offered trial of Glucerna 1x/day (220 kcal,10 g protein per serving); willing to trial. Made aware RD remains available. RD to follow up per protocol. See nutrition recommendations below.

## 2021-09-23 NOTE — DIETITIAN INITIAL EVALUATION ADULT. - OTHER CALCULATIONS
Defer fluids to team. Based on Standards of Care pt >% IBW thus ideal body weight used for all calculations. Needs adjusted for advanced age and post op healing.

## 2021-09-23 NOTE — DIETITIAN INITIAL EVALUATION ADULT. - PERTINENT LABORATORY DATA
Sodium, Serum: 138 mmol/L  Potassium, Serum: 3.6 mmol/L  Chloride, Serum: 109 mmol/L (Elevated)  BUN, Serum: 18 mg/dL  Creatinine, Serum: 1.01 mg/dL  Glucose, Serum: 145 mg/dL (Elevated)  Calcium, Serum: 7.4 mg/dL (Low)  Magnesium, Serum: 2.1 mg/dL  Phosphorus, Serum: 1.5 mg/dL (Low)    Last HbA1c 6.2% (9/14); noted h/o diabetes    Fingersticks 9/22-9/23 9/22: 167 mg/dL  9/22: 240 mg/dL  9/22: 207 mg/dL  9/22: 166 mg/dL  9/23: 139 mg/dL

## 2021-09-24 LAB
ALBUMIN SERPL ELPH-MCNC: 3.3 G/DL — SIGNIFICANT CHANGE UP (ref 3.3–5)
ALP SERPL-CCNC: 62 U/L — SIGNIFICANT CHANGE UP (ref 40–120)
ALT FLD-CCNC: 20 U/L — SIGNIFICANT CHANGE UP (ref 10–45)
ANION GAP SERPL CALC-SCNC: 6 MMOL/L — SIGNIFICANT CHANGE UP (ref 5–17)
APTT BLD: 28.9 SEC — SIGNIFICANT CHANGE UP (ref 27.5–35.5)
AST SERPL-CCNC: 38 U/L — SIGNIFICANT CHANGE UP (ref 10–40)
BILIRUB SERPL-MCNC: 0.7 MG/DL — SIGNIFICANT CHANGE UP (ref 0.2–1.2)
BLD GP AB SCN SERPL QL: NEGATIVE — SIGNIFICANT CHANGE UP
BUN SERPL-MCNC: 22 MG/DL — SIGNIFICANT CHANGE UP (ref 7–23)
CALCIUM SERPL-MCNC: 8.1 MG/DL — LOW (ref 8.4–10.5)
CHLORIDE SERPL-SCNC: 106 MMOL/L — SIGNIFICANT CHANGE UP (ref 96–108)
CO2 SERPL-SCNC: 23 MMOL/L — SIGNIFICANT CHANGE UP (ref 22–31)
CREAT SERPL-MCNC: 0.96 MG/DL — SIGNIFICANT CHANGE UP (ref 0.5–1.3)
GAS PNL BLDA: SIGNIFICANT CHANGE UP
GLUCOSE BLDC GLUCOMTR-MCNC: 153 MG/DL — HIGH (ref 70–99)
GLUCOSE BLDC GLUCOMTR-MCNC: 158 MG/DL — HIGH (ref 70–99)
GLUCOSE BLDC GLUCOMTR-MCNC: 158 MG/DL — HIGH (ref 70–99)
GLUCOSE BLDC GLUCOMTR-MCNC: 179 MG/DL — HIGH (ref 70–99)
GLUCOSE BLDC GLUCOMTR-MCNC: 241 MG/DL — HIGH (ref 70–99)
GLUCOSE SERPL-MCNC: 160 MG/DL — HIGH (ref 70–99)
HCT VFR BLD CALC: 23.9 % — LOW (ref 39–50)
HCT VFR BLD CALC: 24.8 % — LOW (ref 39–50)
HGB BLD-MCNC: 8 G/DL — LOW (ref 13–17)
HGB BLD-MCNC: 8.3 G/DL — LOW (ref 13–17)
INR BLD: 0.95 — SIGNIFICANT CHANGE UP (ref 0.88–1.16)
MAGNESIUM SERPL-MCNC: 2.2 MG/DL — SIGNIFICANT CHANGE UP (ref 1.6–2.6)
MCHC RBC-ENTMCNC: 29.5 PG — SIGNIFICANT CHANGE UP (ref 27–34)
MCHC RBC-ENTMCNC: 29.7 PG — SIGNIFICANT CHANGE UP (ref 27–34)
MCHC RBC-ENTMCNC: 33.5 GM/DL — SIGNIFICANT CHANGE UP (ref 32–36)
MCHC RBC-ENTMCNC: 33.5 GM/DL — SIGNIFICANT CHANGE UP (ref 32–36)
MCV RBC AUTO: 88.3 FL — SIGNIFICANT CHANGE UP (ref 80–100)
MCV RBC AUTO: 88.8 FL — SIGNIFICANT CHANGE UP (ref 80–100)
NRBC # BLD: 0 /100 WBCS — SIGNIFICANT CHANGE UP (ref 0–0)
NRBC # BLD: 0 /100 WBCS — SIGNIFICANT CHANGE UP (ref 0–0)
PHOSPHATE SERPL-MCNC: 2.3 MG/DL — LOW (ref 2.5–4.5)
PLATELET # BLD AUTO: 105 K/UL — LOW (ref 150–400)
PLATELET # BLD AUTO: 124 K/UL — LOW (ref 150–400)
POTASSIUM SERPL-MCNC: 4.1 MMOL/L — SIGNIFICANT CHANGE UP (ref 3.5–5.3)
POTASSIUM SERPL-SCNC: 4.1 MMOL/L — SIGNIFICANT CHANGE UP (ref 3.5–5.3)
PROT SERPL-MCNC: 5.3 G/DL — LOW (ref 6–8.3)
PROTHROM AB SERPL-ACNC: 11.4 SEC — SIGNIFICANT CHANGE UP (ref 10.6–13.6)
RBC # BLD: 2.69 M/UL — LOW (ref 4.2–5.8)
RBC # BLD: 2.81 M/UL — LOW (ref 4.2–5.8)
RBC # FLD: 13.2 % — SIGNIFICANT CHANGE UP (ref 10.3–14.5)
RBC # FLD: 13.2 % — SIGNIFICANT CHANGE UP (ref 10.3–14.5)
RH IG SCN BLD-IMP: POSITIVE — SIGNIFICANT CHANGE UP
SODIUM SERPL-SCNC: 135 MMOL/L — SIGNIFICANT CHANGE UP (ref 135–145)
WBC # BLD: 6.36 K/UL — SIGNIFICANT CHANGE UP (ref 3.8–10.5)
WBC # BLD: 6.93 K/UL — SIGNIFICANT CHANGE UP (ref 3.8–10.5)
WBC # FLD AUTO: 6.36 K/UL — SIGNIFICANT CHANGE UP (ref 3.8–10.5)
WBC # FLD AUTO: 6.93 K/UL — SIGNIFICANT CHANGE UP (ref 3.8–10.5)

## 2021-09-24 PROCEDURE — 71045 X-RAY EXAM CHEST 1 VIEW: CPT | Mod: 26

## 2021-09-24 RX ORDER — SODIUM CHLORIDE 9 MG/ML
3 INJECTION INTRAMUSCULAR; INTRAVENOUS; SUBCUTANEOUS EVERY 8 HOURS
Refills: 0 | Status: DISCONTINUED | OUTPATIENT
Start: 2021-09-24 | End: 2021-09-25

## 2021-09-24 RX ORDER — FUROSEMIDE 40 MG
40 TABLET ORAL ONCE
Refills: 0 | Status: COMPLETED | OUTPATIENT
Start: 2021-09-24 | End: 2021-09-24

## 2021-09-24 RX ADMIN — HEPARIN SODIUM 2500 UNIT(S): 5000 INJECTION INTRAVENOUS; SUBCUTANEOUS at 17:17

## 2021-09-24 RX ADMIN — HEPARIN SODIUM 2500 UNIT(S): 5000 INJECTION INTRAVENOUS; SUBCUTANEOUS at 05:44

## 2021-09-24 RX ADMIN — Medication 40 MILLIGRAM(S): at 06:13

## 2021-09-24 RX ADMIN — Medication 25 MILLIGRAM(S): at 05:43

## 2021-09-24 RX ADMIN — AMIODARONE HYDROCHLORIDE 400 MILLIGRAM(S): 400 TABLET ORAL at 22:53

## 2021-09-24 RX ADMIN — AMIODARONE HYDROCHLORIDE 400 MILLIGRAM(S): 400 TABLET ORAL at 14:42

## 2021-09-24 RX ADMIN — SODIUM CHLORIDE 3 MILLILITER(S): 9 INJECTION INTRAMUSCULAR; INTRAVENOUS; SUBCUTANEOUS at 22:43

## 2021-09-24 RX ADMIN — PANTOPRAZOLE SODIUM 40 MILLIGRAM(S): 20 TABLET, DELAYED RELEASE ORAL at 05:43

## 2021-09-24 RX ADMIN — Medication 2: at 06:53

## 2021-09-24 RX ADMIN — Medication 2: at 17:17

## 2021-09-24 RX ADMIN — ATORVASTATIN CALCIUM 40 MILLIGRAM(S): 80 TABLET, FILM COATED ORAL at 22:53

## 2021-09-24 RX ADMIN — SODIUM CHLORIDE 3 MILLILITER(S): 9 INJECTION INTRAMUSCULAR; INTRAVENOUS; SUBCUTANEOUS at 14:33

## 2021-09-24 RX ADMIN — AMIODARONE HYDROCHLORIDE 400 MILLIGRAM(S): 400 TABLET ORAL at 05:43

## 2021-09-24 RX ADMIN — Medication 2: at 22:54

## 2021-09-24 RX ADMIN — POLYETHYLENE GLYCOL 3350 17 GRAM(S): 17 POWDER, FOR SOLUTION ORAL at 11:25

## 2021-09-24 RX ADMIN — TAMSULOSIN HYDROCHLORIDE 0.4 MILLIGRAM(S): 0.4 CAPSULE ORAL at 22:53

## 2021-09-24 RX ADMIN — Medication 4: at 11:24

## 2021-09-24 RX ADMIN — Medication 25 MILLIGRAM(S): at 17:17

## 2021-09-24 RX ADMIN — CHLORHEXIDINE GLUCONATE 1 APPLICATION(S): 213 SOLUTION TOPICAL at 05:44

## 2021-09-24 RX ADMIN — Medication 81 MILLIGRAM(S): at 11:25

## 2021-09-24 NOTE — CHART NOTE - NSCHARTNOTEFT_GEN_A_CORE
Remaining CT removed per Dr. Jarquin.  CXR post pull shows right sided haziness, unchanged, no pneumothorax.  Patient remains stable.

## 2021-09-24 NOTE — PROGRESS NOTE ADULT - PROVIDER SPECIALTY LIST ADULT
Critical Care
CT Surgery

## 2021-09-24 NOTE — PROGRESS NOTE ADULT - SUBJECTIVE AND OBJECTIVE BOX
CTICU TO [facility] TRANSFER NOTE     Operation / Date: Minimally invasive MV repair on 9/20/21     SUBJECTIVE ASSESSMENT:  71y Male seen at bedside after transfer from CTICU.  He feels well today, ambulated 2 times today in the ICU and is tolerating NC.  Pain is well controlled.  Denies HA, AMS, CP, palpitations, SOB, cough, hemoptysis, n/v/d, fever.      Vital Signs Last 24 Hrs  T(C): 36.7 (24 Sep 2021 14:00), Max: 36.9 (23 Sep 2021 17:09)  T(F): 98.1 (24 Sep 2021 14:00), Max: 98.5 (23 Sep 2021 17:09)  HR: 75 (24 Sep 2021 15:14) (58 - 121)  BP: 143/79 (24 Sep 2021 15:14) (99/58 - 143/79)  BP(mean): 84 (24 Sep 2021 10:15) (75 - 84)  RR: 20 (24 Sep 2021 15:14) (16 - 32)  SpO2: 96% (24 Sep 2021 15:14) (90% - 100%)  I&O's Detail    23 Sep 2021 07:01  -  24 Sep 2021 07:00  --------------------------------------------------------  IN:    Albumin 5%  - 250 mL: 250 mL    IV PiggyBack: 916.5 mL    Oral Fluid: 700 mL    sodium chloride 0.9%: 160 mL  Total IN: 2026.5 mL    OUT:    Chest Tube (mL): 90 mL    Chest Tube (mL): 0 mL    Indwelling Catheter - Urethral (mL): 1400 mL    Voided (mL): 1300 mL  Total OUT: 2790 mL    Total NET: -763.5 mL      24 Sep 2021 07:01  -  24 Sep 2021 16:22  --------------------------------------------------------  IN:  Total IN: 0 mL    OUT:    Voided (mL): 775 mL  Total OUT: 775 mL    Total NET: -775 mL    CHEST TUBE: No   ZACARIAS DRAIN:  No.  EPICARDIAL WIRES: Yes.  TIE DOWNS: Yes.  WARNER: No.    PHYSICAL EXAM:  General: Sitting up in bed, no acute distress.  Neurological: AAOx3, no AMS or focal deficits.   Cardiovascular: RRR, S1/S2, no m/r/g.   Respiratory: No acute distress on 6L NC. Bilateral rales, no wheeze or rhonchi.    Gastrointestinal: ND, NBS, non-TTP.   Extremities: Warm and well perfused, no calf ttp or edema b/l.   Vascular: Pulses 2+ throughout  Incision Sites: R thoracotomy: C/D/I    LABS:                        8.3    6.93  )-----------( 124      ( 24 Sep 2021 11:28 )             24.8       COUMADIN:  No.     PT/INR - ( 24 Sep 2021 02:32 )   PT: 11.4 sec;   INR: 0.95          PTT - ( 24 Sep 2021 02:32 )  PTT:28.9 sec    09-24    135  |  106  |  22  ----------------------------<  160<H>  4.1   |  23  |  0.96    Ca    8.1<L>      24 Sep 2021 02:32  Phos  2.3     09-24  Mg     2.2     09-24    TPro  5.3<L>  /  Alb  3.3  /  TBili  0.7  /  DBili  x   /  AST  38  /  ALT  20  /  AlkPhos  62  09-24    MEDICATIONS  (STANDING):  aMIOdarone    Tablet 400 milliGRAM(s) Oral every 8 hours  aMIOdarone    Tablet   Oral   aspirin enteric coated 81 milliGRAM(s) Oral daily  atorvastatin 40 milliGRAM(s) Oral at bedtime  dextrose 40% Gel 15 Gram(s) Oral once  dextrose 5%. 1000 milliLiter(s) (50 mL/Hr) IV Continuous <Continuous>  glucagon  Injectable 1 milliGRAM(s) IntraMuscular once  heparin   Injectable 2500 Unit(s) SubCutaneous every 12 hours  influenza   Vaccine 0.5 milliLiter(s) IntraMuscular once  insulin lispro (ADMELOG) corrective regimen sliding scale   SubCutaneous Before meals and at bedtime  metoprolol tartrate 25 milliGRAM(s) Oral every 12 hours  pantoprazole    Tablet 40 milliGRAM(s) Oral before breakfast  polyethylene glycol 3350 17 Gram(s) Oral daily  sodium chloride 0.9% lock flush 3 milliLiter(s) IV Push every 8 hours  tamsulosin 0.4 milliGRAM(s) Oral at bedtime    MEDICATIONS  (PRN):  acetaminophen   Tablet .. 650 milliGRAM(s) Oral every 6 hours PRN Mild Pain (1 - 3)  oxyCODONE    IR 5 milliGRAM(s) Oral every 6 hours PRN Moderate Pain (4 - 6)    RADIOLOGY & ADDITIONAL TESTS:    
CTICU  CRITICAL  CARE  attending       7p to 12 midnight    Hand off received 					   Pertinent clinical, laboratory, radiographic, hemodynamic, echocardiographic, respiratory data, microbiologic data and chart were reviewed and analyzed frequently throughout the course of the day and night  Patient seen and examined with CTS/ SH attending at bedside    Pt is a 71y , Male, post op day # 3 s/p MVR; min invasive via right mini thoracotomy;    post op:    inotrope support with dobutamine  weaned off    today:    remains extubated  atrial fibrillation with RVR  s/p amiodarone bolus x 3; enteral Amio load  hypoxemia on O2 supplementation via HFNC  CT chest shows right hemothorax;   Right lung haziness on CXR;       lab results; frequent electrolyte checks and repleted prn  HPI:    70 y/o Georgian/English speaking male, former smoker, presents w/ PMHx of HTN, HLD, DM, tonsil cancer s/p surgery + radiation in 2019 and CAD (s/p PCI 2018) with severe mitral valve insufficiency. He presented to his cardiologist, Dr. Fabian, for further evaluation of mitral regurgitation and cardiac clearance prior to knee replacement surgery. TTE (7/22/21): LVEF 50-55%, LV apex hypokinetic and aneurysmal, severe MR, minimal TR. Denies CP, SOB, palpitations, dizziness/syncope, abdominal pain, N/V, fever/chillls, LE edema, orthopnea, PND, exposure to COVID positive people, cough, loss of taste/smell. He has received covid vaccine x 2. 9/10/21 cardiac cath revealed patent LAD and RPDA stents.     He was seen in the outpatient office for surgical consult with Dr. Jarquin and now presents for elective surgery. NYHA III.             , FAMILY HISTORY:  Family history of stroke (Sibling)    Family history of cerebrovascular accident (CVA) (Sibling, Mother)          , FAMILY HISTORY:  Family history of stroke (Sibling)    Family history of cerebrovascular accident (CVA) (Sibling, Mother)    Family history of acute myocardial infarction (Sibling)    PAST MEDICAL & SURGICAL HISTORY:  Benign prostatic hypertrophy    HTN (hypertension)    HLD (hyperlipidemia)    DM (diabetes mellitus)    Atherosclerosis of coronary artery  CAD (coronary artery disease)    Type 2 diabetes mellitus  Diabetes    Hyperlipidemia  Hyperlipidemia    Essential hypertension  Hypertension    No significant past surgical history      Patient is a 71y old  Male who presents with a chief complaint of MR (23 Sep 2021 13:09)      14 system review limited 2/2 post op morbidity    Vital signs, hemodynamic and respiratory parameters were reviewed from the bedside nursing flowsheet.  ICU Vital Signs Last 24 Hrs  T(C): 36.9 (23 Sep 2021 17:09), Max: 37.2 (22 Sep 2021 22:54)  T(F): 98.5 (23 Sep 2021 17:09), Max: 99 (22 Sep 2021 22:54)  HR: 79 (23 Sep 2021 21:27) (69 - 121)  BP: 100/59 (23 Sep 2021 21:00) (84/58 - 115/57)  BP(mean): 78 (23 Sep 2021 21:00) (66 - 79)  ABP: 122/61 (23 Sep 2021 21:27) (91/58 - 157/75)  ABP(mean): 80 (23 Sep 2021 21:27) (65 - 100)  RR: 23 (23 Sep 2021 21:27) (10 - 34)  SpO2: 100% (23 Sep 2021 21:27) (92% - 100%)    Adult Advanced Hemodynamics Last 24 Hrs  CVP(mm Hg): --  CVP(cm H2O): --  CO: --  CI: --  PA: --  PA(mean): --  PCWP: --  SVR: --  SVRI: --  PVR: --  PVRI: --, ABG - ( 23 Sep 2021 14:33 )  pH, Arterial: 7.43  pH, Blood: x     /  pCO2: 33    /  pO2: 58    / HCO3: 22    / Base Excess: -1.7  /  SaO2: 92.9                Intake and output was reviewed and the fluid balance was calculated  Daily     Daily   I&O's Summary    22 Sep 2021 07:01  -  23 Sep 2021 07:00  --------------------------------------------------------  IN: 831 mL / OUT: 3345 mL / NET: -2514 mL    23 Sep 2021 07:01  -  23 Sep 2021 22:41  --------------------------------------------------------  IN: 1716.5 mL / OUT: 1490 mL / NET: 226.5 mL        All lines and drain sites were assessed  Glycemic trend was reviewedCAPILLARY BLOOD GLUCOSE      POCT Blood Glucose.: 225 mg/dL (23 Sep 2021 21:00)    No acute change in mental status  Auscultation of the chest reveals equal bs  Abdomen is soft  Extremities are warm and well perfused  Wounds appear clean and unremarkable  Antibiotics are periop    labs  CBC Full  -  ( 23 Sep 2021 14:34 )  WBC Count : 9.97 K/uL  RBC Count : 3.16 M/uL  Hemoglobin : 9.2 g/dL  Hematocrit : 27.8 %  Platelet Count - Automated : 107 K/uL  Mean Cell Volume : 88.0 fl  Mean Cell Hemoglobin : 29.1 pg  Mean Cell Hemoglobin Concentration : 33.1 gm/dL  Auto Neutrophil # : x  Auto Lymphocyte # : x  Auto Monocyte # : x  Auto Eosinophil # : x  Auto Basophil # : x  Auto Neutrophil % : x  Auto Lymphocyte % : x  Auto Monocyte % : x  Auto Eosinophil % : x  Auto Basophil % : x    09-23    x   |  x   |  x   ----------------------------<  x   3.8   |  x   |  x     Ca    8.2<L>      23 Sep 2021 14:34  Phos  2.1     09-23  Mg     2.2     09-23    TPro  6.1  /  Alb  3.7  /  TBili  1.0  /  DBili  x   /  AST  24  /  ALT  12  /  AlkPhos  59  09-23    PT/INR - ( 23 Sep 2021 14:34 )   PT: 11.9 sec;   INR: 0.99          PTT - ( 23 Sep 2021 14:34 )  PTT:28.9 sec  The current medications were reviewed   MEDICATIONS  (STANDING):  aMIOdarone    Tablet 400 milliGRAM(s) Oral every 8 hours  aMIOdarone    Tablet   Oral   aspirin enteric coated 81 milliGRAM(s) Oral daily  atorvastatin 40 milliGRAM(s) Oral at bedtime  chlorhexidine 2% Cloths 1 Application(s) Topical <User Schedule>  dextrose 40% Gel 15 Gram(s) Oral once  dextrose 5%. 1000 milliLiter(s) (50 mL/Hr) IV Continuous <Continuous>  dextrose 5%. 1000 milliLiter(s) (100 mL/Hr) IV Continuous <Continuous>  dextrose 50% Injectable 50 milliLiter(s) IV Push every 15 minutes  dextrose 50% Injectable 25 milliLiter(s) IV Push every 15 minutes  glucagon  Injectable 1 milliGRAM(s) IntraMuscular once  heparin   Injectable 2500 Unit(s) SubCutaneous every 12 hours  influenza   Vaccine 0.5 milliLiter(s) IntraMuscular once  insulin lispro (ADMELOG) corrective regimen sliding scale   SubCutaneous Before meals and at bedtime  metoprolol tartrate 25 milliGRAM(s) Oral every 12 hours  pantoprazole    Tablet 40 milliGRAM(s) Oral before breakfast  polyethylene glycol 3350 17 Gram(s) Oral daily  sodium chloride 0.9%. 1000 milliLiter(s) (10 mL/Hr) IV Continuous <Continuous>  tamsulosin 0.4 milliGRAM(s) Oral at bedtime    MEDICATIONS  (PRN):  acetaminophen   Tablet .. 650 milliGRAM(s) Oral every 6 hours PRN Mild Pain (1 - 3)  oxyCODONE    IR 5 milliGRAM(s) Oral every 6 hours PRN Moderate Pain (4 - 6)       PROBLEM LIST/ ASSESSMENT:  HEALTH ISSUES - PROBLEM Dx:      ,   Patient is a 71y old  Male who presents with a chief complaint of MR (23 Sep 2021 13:09)     s/p MVR; min invasive via right mini thoracotomy      My plan includes :    CV:    Inotrope support weaned off  Maintain MAP >60  c/w oral amiodarone loading; for 5g  replete lytes PRN    Pulm:    supplemental O2 as needed  HFNC alternating with standard nasal canula  Titrate Fio2 to maintain Sao2 >95  serial ABGs    maintain negative fluid balance    close hemodynamic, ventilatory and drain monitoring and management per post op routine    Monitor for arrhythmias and monitor parameters for organ perfusion  monitor neurologic status  Head of the bed should remain elevated to 45 deg .   chest PT and IS will be encouraged  monitor adequacy of oxygenation and ventilation and attempt to wean oxygen  Nutritional goals will be met using po eventually , ensure adequate caloric intake and montior the same  Stress ulcer and VTE prophylaxis will be achieved    Glycemic control is satisfactory  Electrolytes have been repleted as necessary and wound care has been carried out. Pain control has been achieved.   agressive physical therapy and early mobility and ambulation goals will be met   The family was updated about the course and plan  CRITICAL CARE TIME SPENT in evaluation and management, reassessments, review and interpretation of labs and x-rays, ventilator and hemodynamic management, formulating a plan and coordinating care: __30____ MIN.  Time does not include procedural time.  CTICU ATTENDING     					    Waqas Long MD                        	
CTICU  CRITICAL  CARE  attending     Hand off received 					   Pertinent clinical, laboratory, radiographic, hemodynamic, echocardiographic, respiratory data, microbiologic data and chart were reviewed and analyzed frequently throughout the course of the day and night        70 y/o Upper sorbian/English speaking male, former smoker, presents with HTN, HLD, DM, tonsil cancer s/p surgery + radiation in 2019 and CAD (s/p PCI 2018) with severe mitral valve insufficiency.   He presented to his cardiologist, Dr. Fabian, for further evaluation of mitral regurgitation and cardiac clearance prior to knee replacement surgery.   TTE (7/22/21): LVEF 50-55%, LV apex hypokinetic and aneurysmal, severe MR, minimal TR. Denies CP, SOB, palpitations, dizziness/syncope, abdominal pain, N/V, fever/chillls, LE edema, orthopnea, PND, exposure to COVID positive people, cough, loss of taste/smell. He has received covid vaccine x 2. 9/10/21 cardiac cath revealed patent LAD and RPDA stents.     S/P Mitral valve repair.  Immediate postoperative course complicated by mediastinal bleeding.        FAMILY HISTORY:  Family history of stroke (Sibling)      Family history of cerebrovascular accident (CVA) (Sibling, Mother)    Family history of acute myocardial infarction (Sibling)    PAST MEDICAL & SURGICAL HISTORY:  Benign prostatic hypertrophy  HTN (hypertension)  HLD (hyperlipidemia)  DM (diabetes mellitus)  Atherosclerosis of coronary artery  CAD (coronary artery disease)  Type 2 diabetes mellitus  Hyperlipidemia  Hyperlipidemia  Essential hypertension  Hypertension  No significant past surgical history        14 system review was unremarkable      Vital signs, hemodynamic and respiratory parameters were reviewed from the bedside nursing flow sheet.  ICU Vital Signs Last 24 Hrs  T(C): 37.3 (22 Sep 2021 18:01), Max: 37.6 (22 Sep 2021 14:00)  T(F): 99.1 (22 Sep 2021 18:01), Max: 99.6 (22 Sep 2021 14:00)  HR: 81 (22 Sep 2021 20:53) (80 - 107)  BP: 119/67 (22 Sep 2021 10:55) (118/59 - 129/73)  BP(mean): 87 (22 Sep 2021 10:55) (82 - 96)  ABP: 134/65 (22 Sep 2021 20:00) (114/62 - 143/67)  ABP(mean): 85 (22 Sep 2021 20:00) (71 - 92)  RR: 20 (22 Sep 2021 20:53) (7 - 33)  SpO2: 94% (22 Sep 2021 20:53) (88% - 95%)    Adult Advanced Hemodynamics Last 24 Hrs  CVP(mm Hg): 9 (22 Sep 2021 10:20) (6 - 15)  CVP(cm H2O): --  CO: --  CI: --  PA: --  PA(mean): --  PCWP: --  SVR: --  SVRI: --  PVR: --  PVRI: --, ABG - ( 22 Sep 2021 13:00 )  pH, Arterial: 7.46  pH, Blood: x     /  pCO2: 33    /  pO2: 69    / HCO3: 24    / Base Excess: 0.3   /  SaO2: 96.3                Intake and output was reviewed and the fluid balance was calculated  Daily     Daily   I&O's Summary    21 Sep 2021 07:01  -  22 Sep 2021 07:00  --------------------------------------------------------  IN: 2535.6 mL / OUT: 2970 mL / NET: -434.4 mL    22 Sep 2021 07:01  -  22 Sep 2021 21:07  --------------------------------------------------------  IN: 297.7 mL / OUT: 1960 mL / NET: -1662.3 mL        All lines and drain sites were assessed    Neuro: No focal motor deficit.   Neck: No JVD.  CVS: S1, S2, No S3.  Lungs: Good air entry bilaterally.  Abd: Soft. No tenderness. + Bowel sounds.  Vascular: + DP/PT.  Extremities: No edema.  Lymphatic: Normal.  Skin: No abnormalities.      labs  CBC Full  -  ( 22 Sep 2021 13:04 )  WBC Count : 9.92 K/uL  RBC Count : 3.13 M/uL  Hemoglobin : 9.2 g/dL  Hematocrit : 27.3 %  Platelet Count - Automated : 85 K/uL  Mean Cell Volume : 87.2 fl  Mean Cell Hemoglobin : 29.4 pg  Mean Cell Hemoglobin Concentration : 33.7 gm/dL  Auto Neutrophil # : x  Auto Lymphocyte # : x  Auto Monocyte # : x  Auto Eosinophil # : x  Auto Basophil # : x  Auto Neutrophil % : x  Auto Lymphocyte % : x  Auto Monocyte % : x  Auto Eosinophil % : x  Auto Basophil % : x    09-22    x   |  x   |  x   ----------------------------<  x   3.6   |  x   |  x     Ca    8.4      22 Sep 2021 13:04  Phos  1.6     09-22  Mg     1.9     09-22    TPro  5.3<L>  /  Alb  3.5  /  TBili  0.9  /  DBili  x   /  AST  26  /  ALT  11  /  AlkPhos  42  09-22    PT/INR - ( 22 Sep 2021 13:04 )   PT: 13.9 sec;   INR: 1.17          PTT - ( 22 Sep 2021 13:04 )  PTT:33.2 sec  The current medications were reviewed   MEDICATIONS  (STANDING):  aspirin enteric coated 81 milliGRAM(s) Oral daily  atorvastatin 40 milliGRAM(s) Oral at bedtime  chlorhexidine 2% Cloths 1 Application(s) Topical <User Schedule>  dextrose 40% Gel 15 Gram(s) Oral once  dextrose 5%. 1000 milliLiter(s) (50 mL/Hr) IV Continuous <Continuous>  dextrose 5%. 1000 milliLiter(s) (100 mL/Hr) IV Continuous <Continuous>  dextrose 50% Injectable 50 milliLiter(s) IV Push every 15 minutes  dextrose 50% Injectable 25 milliLiter(s) IV Push every 15 minutes  DOBUTamine Infusion 5 MICROgram(s)/kG/Min (12.9 mL/Hr) IV Continuous <Continuous>  glucagon  Injectable 1 milliGRAM(s) IntraMuscular once  heparin   Injectable 2500 Unit(s) SubCutaneous every 12 hours  influenza   Vaccine 0.5 milliLiter(s) IntraMuscular once  insulin lispro (ADMELOG) corrective regimen sliding scale   SubCutaneous Before meals and at bedtime  metoprolol tartrate 25 milliGRAM(s) Oral every 12 hours  pantoprazole    Tablet 40 milliGRAM(s) Oral before breakfast  polyethylene glycol 3350 17 Gram(s) Oral daily  potassium chloride  20 mEq/100 mL IVPB 20 milliEquivalent(s) IV Intermittent every 1 hour  sodium chloride 0.9%. 1000 milliLiter(s) (10 mL/Hr) IV Continuous <Continuous>  tamsulosin 0.4 milliGRAM(s) Oral at bedtime    MEDICATIONS  (PRN):  acetaminophen   Tablet .. 650 milliGRAM(s) Oral every 6 hours PRN Mild Pain (1 - 3)  oxyCODONE    IR 5 milliGRAM(s) Oral every 6 hours PRN Moderate Pain (4 - 6)          71y old  Male with severe mitral regurgitation.  S/P Mitral valve repair.  Diminished CT drainage.  Hemodynamically stable.  Good oxygenation.  Fair urine out put.        My plan includes :  CT chest to rule out right hemothorax.  Statin and Betablocker.  Close hemodynamic, ventilatory and drain monitoring and management  Monitor for arrhythmias and monitor parameters for organ perfusion  Monitor neurologic status  Monitor renal function.  Head of the bed should remain elevated to 45 deg .   Chest PT and IS will be encouraged  Monitor adequacy of oxygenation and ventilation and attempt to wean oxygen  Nutritional goals will be met using po eventually , ensure adequate caloric intake and monitor the same  Stress ulcer and VTE prophylaxis will be achieved    Glycemic control is satisfactory  Electrolytes have been repleted as necessary and wound care has been carried out. Pain control has been achieved.   Aggressive physical therapy and early mobility and ambulation goals will be met   The family was updated about the course and plan  CRITICAL CARE TIME SPENT in evaluation and management, reassessments, review and interpretation of labs and x-rays, ventilator and hemodynamic management, formulating a plan and coordinating care: ___30____ MIN.  Time does not include procedural time.  CTICU ATTENDING     					    Dileep Arndt MD                        	
CTICU  CRITICAL  CARE  attending     Hand off received 					   Pertinent clinical, laboratory, radiographic, hemodynamic, echocardiographic, respiratory data, microbiologic data and chart were reviewed and analyzed frequently throughout the course of the day and night        72 y/o Irish/English speaking male, former smoker, presents with HTN, HLD, DM, tonsil cancer s/p surgery + radiation in 2019 and CAD (s/p PCI 2018) with severe mitral valve insufficiency.   He presented to his cardiologist, Dr. Fabian, for further evaluation of mitral regurgitation and cardiac clearance prior to knee replacement surgery.   TTE (7/22/21): LVEF 50-55%, LV apex hypokinetic and aneurysmal, severe MR, minimal TR. Denies CP, SOB, palpitations, dizziness/syncope, abdominal pain, N/V, fever/chillls, LE edema, orthopnea, PND, exposure to COVID positive people, cough, loss of taste/smell. He has received covid vaccine x 2. 9/10/21 cardiac cath revealed patent LAD and RPDA stents.     S/P Mitral valve repair.  Immediate postoperative course complicated by mediastinal bleeding.      FAMILY HISTORY:  Family history of stroke (Sibling)    Family history of cerebrovascular accident (CVA) (Sibling, Mother)    Family history of acute myocardial infarction (Sibling)    PAST MEDICAL & SURGICAL HISTORY:  Benign prostatic hypertrophy  HTN (hypertension)  HLD (hyperlipidemia)  DM (diabetes mellitus)  Atherosclerosis of coronary artery  CAD (coronary artery disease)  Type 2 diabetes mellitus  Diabetes  Hyperlipidemia  Hyperlipidemia  Essential hypertension  Hypertension  No significant past surgical history        14 system review was unremarkable    Vital signs, hemodynamic and respiratory parameters were reviewed from the bedside nursing flow sheet.  ICU Vital Signs Last 24 Hrs  T(C): 36.8 (21 Sep 2021 17:15), Max: 36.8 (21 Sep 2021 17:15)  T(F): 98.2 (21 Sep 2021 17:15), Max: 98.2 (21 Sep 2021 17:15)  HR: 103 (21 Sep 2021 22:00) (78 - 103)  BP: --  BP(mean): --  ABP: 142/64 (21 Sep 2021 22:00) (102/47 - 163/68)  ABP(mean): 85 (21 Sep 2021 22:00) (62 - 92)  RR: 28 (21 Sep 2021 22:00) (6 - 42)  SpO2: 95% (21 Sep 2021 22:00) (93% - 100%)    Adult Advanced Hemodynamics Last 24 Hrs  CVP(mm Hg): 12 (21 Sep 2021 22:00) (5 - 295)  CVP(cm H2O): --  CO: --  CI: --  PA: --  PA(mean): --  PCWP: --  SVR: --  SVRI: --  PVR: --  PVRI: --, ABG - ( 21 Sep 2021 21:51 )  pH, Arterial: 7.43  pH, Blood: x     /  pCO2: 36    /  pO2: 74    / HCO3: 24    / Base Excess: -0.1  /  SaO2: 96.7              Mode: CPAP with PS  FiO2: 50  PEEP: 5  MAP: 9  PIP: 17    Intake and output was reviewed and the fluid balance was calculated  Daily     Daily   I&O's Summary    20 Sep 2021 07:01  -  21 Sep 2021 07:00  --------------------------------------------------------  IN: 5316.4 mL / OUT: 6295 mL / NET: -978.6 mL    21 Sep 2021 07:01  -  21 Sep 2021 22:31  --------------------------------------------------------  IN: 1786.3 mL / OUT: 2080 mL / NET: -293.7 mL        All lines and drain sites were assessed    Neuro: Follows commands. Moves all 4 extremities.  Neck: No JVD.  CVS: S1, S2, No S3.  Lungs: Good air entry bilaterally.  Abd: Soft. No tenderness. + Bowel sounds.  Vascular: + DP/PT.  Extremities: No edema.  Lymphatic: Normal.  Skin: No abnormalities.      labs  CBC Full  -  ( 21 Sep 2021 21:40 )  WBC Count : 9.80 K/uL  RBC Count : 3.03 M/uL  Hemoglobin : 8.9 g/dL  Hematocrit : 26.6 %  Platelet Count - Automated : 85 K/uL  Mean Cell Volume : 87.8 fl  Mean Cell Hemoglobin : 29.4 pg  Mean Cell Hemoglobin Concentration : 33.5 gm/dL  Auto Neutrophil # : x  Auto Lymphocyte # : x  Auto Monocyte # : x  Auto Eosinophil # : x  Auto Basophil # : x  Auto Neutrophil % : x  Auto Lymphocyte % : x  Auto Monocyte % : x  Auto Eosinophil % : x  Auto Basophil % : x    09-21    139  |  106  |  18  ----------------------------<  204<H>  3.7   |  24  |  1.14    Ca    8.7      21 Sep 2021 21:40  Phos  2.1     09-21  Mg     2.0     09-21    TPro  5.6<L>  /  Alb  3.9  /  TBili  0.9  /  DBili  x   /  AST  31  /  ALT  12  /  AlkPhos  41  09-21    PT/INR - ( 21 Sep 2021 21:40 )   PT: 15.5 sec;   INR: 1.31          PTT - ( 21 Sep 2021 21:40 )  PTT:36.5 sec  The current medications were reviewed   MEDICATIONS  (STANDING):  acetaminophen  IVPB .. 1000 milliGRAM(s) IV Intermittent once  aspirin enteric coated 81 milliGRAM(s) Oral daily  atorvastatin 40 milliGRAM(s) Oral at bedtime  ceFAZolin   IVPB 2000 milliGRAM(s) IV Intermittent every 8 hours  chlorhexidine 2% Cloths 1 Application(s) Topical <User Schedule>  dextrose 40% Gel 15 Gram(s) Oral once  dextrose 5%. 1000 milliLiter(s) (50 mL/Hr) IV Continuous <Continuous>  dextrose 5%. 1000 milliLiter(s) (100 mL/Hr) IV Continuous <Continuous>  dextrose 50% Injectable 50 milliLiter(s) IV Push every 15 minutes  dextrose 50% Injectable 25 milliLiter(s) IV Push every 15 minutes  DOBUTamine Infusion 5 MICROgram(s)/kG/Min (12.9 mL/Hr) IV Continuous <Continuous>  glucagon  Injectable 1 milliGRAM(s) IntraMuscular once  influenza   Vaccine 0.5 milliLiter(s) IntraMuscular once  insulin lispro (ADMELOG) corrective regimen sliding scale   SubCutaneous Before meals and at bedtime  pantoprazole    Tablet 40 milliGRAM(s) Oral before breakfast  polyethylene glycol 3350 17 Gram(s) Oral daily  sodium chloride 0.9%. 1000 milliLiter(s) (10 mL/Hr) IV Continuous <Continuous>  tamsulosin 0.4 milliGRAM(s) Oral at bedtime    MEDICATIONS  (PRN):  acetaminophen   Tablet .. 650 milliGRAM(s) Oral every 6 hours PRN Mild Pain (1 - 3)  oxyCODONE    IR 5 milliGRAM(s) Oral every 6 hours PRN Moderate Pain (4 - 6)          71y old  Male with severe mitral regurgitation.  S/P Mitral valve repair.  Immediate postoperative course complicated by mediastinal bleeding requiring transfusion support.  Hemodynamically stable.  Good oxygenation.  Fair urine out put.        My plan includes :  D/C Dobutamine.   Statin and Betablocker.  Close hemodynamic, ventilatory and drain monitoring and management  Monitor for arrhythmias and monitor parameters for organ perfusion  Monitor neurologic status  Monitor renal function.  Head of the bed should remain elevated to 45 deg .   Chest PT and IS will be encouraged  Monitor adequacy of oxygenation and ventilation and attempt to wean oxygen  Nutritional goals will be met using po eventually , ensure adequate caloric intake and monitor the same  Stress ulcer and VTE prophylaxis will be achieved    Glycemic control is satisfactory  Electrolytes have been repleted as necessary and wound care has been carried out. Pain control has been achieved.   Aggressive physical therapy and early mobility and ambulation goals will be met   The family was updated about the course and plan  CRITICAL CARE TIME SPENT in evaluation and management, reassessments, review and interpretation of labs and x-rays, ventilator and hemodynamic management, formulating a plan and coordinating care: ___30____ MIN.  Time does not include procedural time.  CTICU ATTENDING     					    Dileep Arndt MD                        	
CTICU  CRITICAL  CARE  attending     Hand off received 					   Pertinent clinical, laboratory, radiographic, hemodynamic, echocardiographic, respiratory data, microbiologic data and chart were reviewed and analyzed frequently throughout the course of the day and night    70 y/o Portuguese/English speaking male, former smoker, presents with HTN, HLD, DM, tonsil cancer s/p surgery + radiation in 2019 and CAD (s/p PCI 2018) with severe mitral valve insufficiency.   He presented to his cardiologist, Dr. Fabian, for further evaluation of mitral regurgitation and cardiac clearance prior to knee replacement surgery.   TTE (7/22/21): LVEF 50-55%, LV apex hypokinetic and aneurysmal, severe MR, minimal TR. Denies CP, SOB, palpitations, dizziness/syncope, abdominal pain, N/V, fever/chillls, LE edema, orthopnea, PND, exposure to COVID positive people, cough, loss of taste/smell. He has received covid vaccine x 2. 9/10/21 cardiac cath revealed patent LAD and RPDA stents.     S/P Mitral valve repair.       FAMILY HISTORY:  Family history of stroke (Sibling)    Family history of cerebrovascular accident (CVA) (Sibling, Mother)    Family history of acute myocardial infarction (Sibling)    PAST MEDICAL & SURGICAL HISTORY:  Benign prostatic hypertrophy    HTN (hypertension)  HLD (hyperlipidemia)  DM (diabetes mellitus)  Atherosclerosis of coronary artery  CAD (coronary artery disease)  Type 2 diabetes mellitus  Hyperlipidemia  Hyperlipidemia  Essential hypertension  Hypertension  No significant past surgical history        14 system review was unremarkable    Vital signs, hemodynamic and respiratory parameters were reviewed from the bedside nursing flow sheet.  ICU Vital Signs Last 24 Hrs  T(C): 36.2 (20 Sep 2021 21:25), Max: 36.3 (20 Sep 2021 17:33)  T(F): 97.2 (20 Sep 2021 21:25), Max: 97.3 (20 Sep 2021 17:33)  HR: 81 (20 Sep 2021 22:00) (71 - 96)  BP: 90/50 (20 Sep 2021 17:00) (90/50 - 130/58)  BP(mean): 65 (20 Sep 2021 17:00) (65 - 83)  ABP: 154/67 (20 Sep 2021 22:00) (99/57 - 154/67)  ABP(mean): 91 (20 Sep 2021 22:00) (67 - 91)  RR: 15 (20 Sep 2021 22:00) (15 - 16)  SpO2: 100% (20 Sep 2021 22:00) (100% - 100%)    Adult Advanced Hemodynamics Last 24 Hrs  CVP(mm Hg): 10 (20 Sep 2021 22:00) (6 - 14)  CVP(cm H2O): --  CO: --  CI: --  PA: --  PA(mean): --  PCWP: --  SVR: --  SVRI: --  PVR: --  PVRI: --, ABG - ( 20 Sep 2021 22:01 )  pH, Arterial: 7.39  pH, Blood: x     /  pCO2: 37    /  pO2: 156   / HCO3: 22    / Base Excess: -2.2  /  SaO2: 98.7              Mode: AC/ CMV (Assist Control/ Continuous Mandatory Ventilation)  RR (machine): 16  TV (machine): 700  FiO2: 50  PEEP: 5  ITime: 1  MAP: 12  PIP: 27    Intake and output was reviewed and the fluid balance was calculated  Daily     Daily   I&O's Summary    20 Sep 2021 07:01  -  20 Sep 2021 23:35  --------------------------------------------------------  IN: 3907 mL / OUT: 3575 mL / NET: 332 mL        All lines and drain sites were assessed    Neuro: No change in the mental status from the baseline. Follows commands. Moves all 4 extremities.  Neck: No JVD.  CVS: S1, S2, No S3.  Lungs: Diminished breath sounds on the righty side.  Abd: Soft. No tenderness. + Bowel sounds.  Vascular: + DP/PT.  Extremities: No edema.  Lymphatic: Normal.  Skin: No abnormalities.      labs  CBC Full  -  ( 20 Sep 2021 22:05 )  WBC Count : 7.01 K/uL  RBC Count : 2.89 M/uL  Hemoglobin : 8.6 g/dL  Hematocrit : 25.2 %  Platelet Count - Automated : 106 K/uL  Mean Cell Volume : 87.2 fl  Mean Cell Hemoglobin : 29.8 pg  Mean Cell Hemoglobin Concentration : 34.1 gm/dL  Auto Neutrophil # : x  Auto Lymphocyte # : x  Auto Monocyte # : x  Auto Eosinophil # : x  Auto Basophil # : x  Auto Neutrophil % : x  Auto Lymphocyte % : x  Auto Monocyte % : x  Auto Eosinophil % : x  Auto Basophil % : x    09-20    144  |  110<H>  |  11  ----------------------------<  127<H>  4.0   |  24  |  0.86    Ca    9.0      20 Sep 2021 22:05  Phos  2.4     09-20  Mg     2.1     09-20    TPro  5.5<L>  /  Alb  4.5  /  TBili  1.1  /  DBili  x   /  AST  27  /  ALT  11  /  AlkPhos  41  09-20    PT/INR - ( 20 Sep 2021 22:05 )   PT: 15.5 sec;   INR: 1.31          PTT - ( 20 Sep 2021 22:05 )  PTT:38.0 sec  The current medications were reviewed   MEDICATIONS  (STANDING):  albumin human  5% IVPB 250 milliLiter(s) IV Intermittent every 30 minutes  albumin human  5% IVPB 250 milliLiter(s) IV Intermittent every 1 hour  albumin human  5% IVPB 250 milliLiter(s) IV Intermittent every 30 minutes  aspirin enteric coated 81 milliGRAM(s) Oral daily  atorvastatin 40 milliGRAM(s) Oral at bedtime  ceFAZolin   IVPB 2000 milliGRAM(s) IV Intermittent every 8 hours  chlorhexidine 0.12% Liquid 15 milliLiter(s) Swish and Spit two times a day  chlorhexidine 2% Cloths 1 Application(s) Topical <User Schedule>  dextrose 50% Injectable 50 milliLiter(s) IV Push every 15 minutes  dextrose 50% Injectable 25 milliLiter(s) IV Push every 15 minutes  DOBUTamine Infusion 5 MICROgram(s)/kG/Min (12.9 mL/Hr) IV Continuous <Continuous>  EPINEPHrine    Infusion 0.2 MICROgram(s)/kG/Min (64.7 mL/Hr) IV Continuous <Continuous>  fentaNYL    Injectable 25 MICROGram(s) IV Push once  influenza   Vaccine 0.5 milliLiter(s) IntraMuscular once  insulin regular Infusion 1 Unit(s)/Hr (1 mL/Hr) IV Continuous <Continuous>  pantoprazole  Injectable 40 milliGRAM(s) IV Push daily  phenylephrine    Infusion 0.2 MICROgram(s)/kG/Min (6.47 mL/Hr) IV Continuous <Continuous>  propofol Infusion 10 MICROgram(s)/kG/Min (5.17 mL/Hr) IV Continuous <Continuous>  sodium chloride 0.9%. 1000 milliLiter(s) (10 mL/Hr) IV Continuous <Continuous>  tamsulosin 0.4 milliGRAM(s) Oral at bedtime          71y old  Male with severe mitral regurgitation.  S/P Mitral valve repair.  Metabolic acidosis.  Mediastinal bleeding.  Hemodynamically stable.  Good oxygenation.  Fair urine out put.        My plan includes :  WEAN to Extubate.  Statin and Betablocker.  Close hemodynamic, ventilatory and drain monitoring and management  Monitor for arrhythmias and monitor parameters for organ perfusion  Monitor neurologic status  Monitor renal function.  Head of the bed should remain elevated to 45 deg .   Chest PT and IS will be encouraged  Monitor adequacy of oxygenation and ventilation and attempt to wean oxygen  Nutritional goals will be met using po eventually , ensure adequate caloric intake and monitor the same  Stress ulcer and VTE prophylaxis will be achieved    Glycemic control is satisfactory  Electrolytes have been repleted as necessary and wound care has been carried out. Pain control has been achieved.   Aggressive physical therapy and early mobility and ambulation goals will be met   The family was updated about the course and plan  CRITICAL CARE TIME SPENT in evaluation and management, reassessments, review and interpretation of labs and x-rays, ventilator and hemodynamic management, formulating a plan and coordinating care: ___90____ MIN.  Time does not include procedural time.  CTICU ATTENDING     					    Dileep Arndt MD                        	
CTICU  CRITICAL  CARE  attending     Hand off received 					   Pertinent clinical, laboratory, radiographic, hemodynamic, echocardiographic, respiratory data, microbiologic data and chart were reviewed and analyzed frequently throughout the course of the day and night  Patient seen and examined with CTS/ SH attending at bedside    Pt is a 71y , Male, post op day # 2 s/p MVR; min invasive via right mini thoracotomy;    post op:    inotrope support with dobutamine    today:    remains extubated  hypoxemia on O2 supplementation via HFNC  weaning inotrope support; on dobutamine  diuresed  Right lung haziness on CXR;   Repeat CXR with some improvement    lab results; frequent electrolyte check and  multiple CXRs to evaluate Right opacification; carried out during the course of the day    HPI:    70 y/o Slovak/English speaking male, former smoker, presents w/ PMHx of HTN, HLD, DM, tonsil cancer s/p surgery + radiation in 2019 and CAD (s/p PCI 2018) with severe mitral valve insufficiency. He presented to his cardiologist, Dr. Fabian, for further evaluation of mitral regurgitation and cardiac clearance prior to knee replacement surgery. TTE (7/22/21): LVEF 50-55%, LV apex hypokinetic and aneurysmal, severe MR, minimal TR. Denies CP, SOB, palpitations, dizziness/syncope, abdominal pain, N/V, fever/chillls, LE edema, orthopnea, PND, exposure to COVID positive people, cough, loss of taste/smell. He has received covid vaccine x 2. 9/10/21 cardiac cath revealed patent LAD and RPDA stents.     He was seen in the outpatient office for surgical consult with Dr. Jarquin and now presents for elective surgery. NYHA III.             , FAMILY HISTORY:  Family history of stroke (Sibling)    Family history of cerebrovascular accident (CVA) (Sibling, Mother)    Family history of acute myocardial infarction (Sibling)    PAST MEDICAL & SURGICAL HISTORY:  Benign prostatic hypertrophy    HTN (hypertension)    HLD (hyperlipidemia)    DM (diabetes mellitus)    Atherosclerosis of coronary artery  CAD (coronary artery disease)    Type 2 diabetes mellitus  Diabetes    Hyperlipidemia  Hyperlipidemia    Essential hypertension  Hypertension    No significant past surgical history      Patient is a 71y old  Male who presents with a chief complaint of MR (21 Sep 2021 22:30)      14 system review limited 2/2 post op morbidity    Vital signs, hemodynamic and respiratory parameters were reviewed from the bedside nursing flowsheet.  ICU Vital Signs Last 24 Hrs  T(C): 37.3 (22 Sep 2021 18:01), Max: 37.6 (22 Sep 2021 14:00)  T(F): 99.1 (22 Sep 2021 18:01), Max: 99.6 (22 Sep 2021 14:00)  HR: 98 (22 Sep 2021 17:00) (85 - 106)  BP: 119/67 (22 Sep 2021 10:55) (118/59 - 129/73)  BP(mean): 87 (22 Sep 2021 10:55) (82 - 96)  ABP: 143/67 (22 Sep 2021 17:00) (114/62 - 143/67)  ABP(mean): 89 (22 Sep 2021 17:00) (71 - 92)  RR: 22 (22 Sep 2021 17:00) (6 - 33)  SpO2: 91% (22 Sep 2021 17:00) (88% - 97%)    Adult Advanced Hemodynamics Last 24 Hrs  CVP(mm Hg): 9 (22 Sep 2021 10:20) (6 - 16)  CVP(cm H2O): --  CO: --  CI: --  PA: --  PA(mean): --  PCWP: --  SVR: --  SVRI: --  PVR: --  PVRI: --, ABG - ( 22 Sep 2021 13:00 )  pH, Arterial: 7.46  pH, Blood: x     /  pCO2: 33    /  pO2: 69    / HCO3: 24    / Base Excess: 0.3   /  SaO2: 96.3                Intake and output was reviewed and the fluid balance was calculated  Daily     Daily   I&O's Summary    21 Sep 2021 07:01  -  22 Sep 2021 07:00  --------------------------------------------------------  IN: 2535.6 mL / OUT: 2970 mL / NET: -434.4 mL    22 Sep 2021 07:01  -  22 Sep 2021 18:45  --------------------------------------------------------  IN: 297.7 mL / OUT: 1960 mL / NET: -1662.3 mL        All lines and drain sites were assessed  [facility]      POCT Blood Glucose.: 207 mg/dL (22 Sep 2021 16:49)    No acute change in mental status  Auscultation of the chest reveals equal bs  Abdomen is soft  Extremities are warm and well perfused  Wounds appear clean and unremarkable  Antibiotics are periop    labs  CBC Full  -  ( 22 Sep 2021 13:04 )  WBC Count : 9.92 K/uL  RBC Count : 3.13 M/uL  Hemoglobin : 9.2 g/dL  Hematocrit : 27.3 %  Platelet Count - Automated : 85 K/uL  Mean Cell Volume : 87.2 fl  Mean Cell Hemoglobin : 29.4 pg  Mean Cell Hemoglobin Concentration : 33.7 gm/dL  Auto Neutrophil # : x  Auto Lymphocyte # : x  Auto Monocyte # : x  Auto Eosinophil # : x  Auto Basophil # : x  Auto Neutrophil % : x  Auto Lymphocyte % : x  Auto Monocyte % : x  Auto Eosinophil % : x  Auto Basophil % : x    09-22    x   |  x   |  x   ----------------------------<  x   3.6   |  x   |  x     Ca    8.4      22 Sep 2021 13:04  Phos  1.6     09-22  Mg     1.9     09-22    TPro  5.3<L>  /  Alb  3.5  /  TBili  0.9  /  DBili  x   /  AST  26  /  ALT  11  /  AlkPhos  42  09-22    PT/INR - ( 22 Sep 2021 13:04 )   PT: 13.9 sec;   INR: 1.17          PTT - ( 22 Sep 2021 13:04 )  PTT:33.2 sec  The current medications were reviewed   MEDICATIONS  (STANDING):  aspirin enteric coated 81 milliGRAM(s) Oral daily  atorvastatin 40 milliGRAM(s) Oral at bedtime  chlorhexidine 2% Cloths 1 Application(s) Topical <User Schedule>  dextrose 40% Gel 15 Gram(s) Oral once  dextrose 5%. 1000 milliLiter(s) (50 mL/Hr) IV Continuous <Continuous>  dextrose 5%. 1000 milliLiter(s) (100 mL/Hr) IV Continuous <Continuous>  dextrose 50% Injectable 50 milliLiter(s) IV Push every 15 minutes  dextrose 50% Injectable 25 milliLiter(s) IV Push every 15 minutes  DOBUTamine Infusion 5 MICROgram(s)/kG/Min (12.9 mL/Hr) IV Continuous <Continuous>  glucagon  Injectable 1 milliGRAM(s) IntraMuscular once  heparin   Injectable 2500 Unit(s) SubCutaneous every 12 hours  influenza   Vaccine 0.5 milliLiter(s) IntraMuscular once  insulin lispro (ADMELOG) corrective regimen sliding scale   SubCutaneous Before meals and at bedtime  magnesium sulfate  IVPB 2 Gram(s) IV Intermittent once  metoprolol tartrate 25 milliGRAM(s) Oral every 12 hours  pantoprazole    Tablet 40 milliGRAM(s) Oral before breakfast  polyethylene glycol 3350 17 Gram(s) Oral daily  potassium chloride  20 mEq/100 mL IVPB 20 milliEquivalent(s) IV Intermittent every 1 hour  sodium chloride 0.9%. 1000 milliLiter(s) (10 mL/Hr) IV Continuous <Continuous>  tamsulosin 0.4 milliGRAM(s) Oral at bedtime    MEDICATIONS  (PRN):  acetaminophen   Tablet .. 650 milliGRAM(s) Oral every 6 hours PRN Mild Pain (1 - 3)  oxyCODONE    IR 5 milliGRAM(s) Oral every 6 hours PRN Moderate Pain (4 - 6)       PROBLEM LIST/ ASSESSMENT:  HEALTH ISSUES - PROBLEM Dx:      ,   Patient is a 71y old  Male who presents with a chief complaint of MR (21 Sep 2021 22:30)     s/p MVR; min invasive via right mini thoracotomy    acute changes include acute respiratory failure    My plan includes :    CV:    Inotrope support weaned off  Maintain MAP >60  trend lactate/base deficit  closely monitor for low cardiac output syndrome    Pulm:    supplemental O2 as needed  HFNC alternating with standard nasal canula  Titrate Fio2 to maintain Sao2 >95  serial ABGs    maintain negative fluid balance    close hemodynamic, ventilatory and drain monitoring and management per post op routine    Monitor for arrhythmias and monitor parameters for organ perfusion  monitor neurologic status  Head of the bed should remain elevated to 45 deg .   chest PT and IS will be encouraged  monitor adequacy of oxygenation and ventilation and attempt to wean oxygen  Nutritional goals will be met using po eventually , ensure adequate caloric intake and montior the same  Stress ulcer and VTE prophylaxis will be achieved    Glycemic control is satisfactory  Electrolytes have been repleted as necessary and wound care has been carried out. Pain control has been achieved.   agressive physical therapy and early mobility and ambulation goals will be met   The family was updated about the course and plan  CRITICAL CARE TIME SPENT in evaluation and management, reassessments, review and interpretation of labs and x-rays, ventilator and hemodynamic management, formulating a plan and coordinating care: ___90____ MIN.  Time does not include procedural time.  CTICU ATTENDING     					    Waqas Long MD                        	
CTICU  CRITICAL  CARE  attending     Hand off received 					   Pertinent clinical, laboratory, radiographic, hemodynamic, echocardiographic, respiratory data, microbiologic data and chart were reviewed and analyzed frequently throughout the course of the day and night  Patient seen and examined with CTS/ SH attending at bedside  Pt is a 71y , Male, HEALTH ISSUES - PROBLEM Dx:      , FAMILY HISTORY:  Family history of stroke (Sibling)    Family history of cerebrovascular accident (CVA) (Sibling, Mother)    Family history of acute myocardial infarction (Sibling)    PAST MEDICAL & SURGICAL HISTORY:  Benign prostatic hypertrophy    HTN (hypertension)    HLD (hyperlipidemia)    DM (diabetes mellitus)    Atherosclerosis of coronary artery  CAD (coronary artery disease)    Type 2 diabetes mellitus  Diabetes    Hyperlipidemia  Hyperlipidemia    Essential hypertension  Hypertension    No significant past surgical history      Patient is a 71y old  Male who presents with a chief complaint of MR (16 Sep 2021 17:23)      14 system review limited by mentation and multiorgan morbidity     Vital signs, hemodynamic and respiratory parameters were reviewed from the bedside nursing flowsheet.  ICU Vital Signs Last 24 Hrs  T(C): 36.2 (20 Sep 2021 21:25), Max: 36.3 (20 Sep 2021 17:33)  T(F): 97.2 (20 Sep 2021 21:25), Max: 97.3 (20 Sep 2021 17:33)  HR: 81 (20 Sep 2021 22:00) (71 - 96)  BP: 90/50 (20 Sep 2021 17:00) (90/50 - 130/58)  BP(mean): 65 (20 Sep 2021 17:00) (65 - 83)  ABP: 154/67 (20 Sep 2021 22:00) (99/57 - 154/67)  ABP(mean): 91 (20 Sep 2021 22:00) (67 - 91)  RR: 15 (20 Sep 2021 22:00) (15 - 16)  SpO2: 100% (20 Sep 2021 22:00) (100% - 100%)    Adult Advanced Hemodynamics Last 24 Hrs  CVP(mm Hg): 10 (20 Sep 2021 22:00) (6 - 14)  CVP(cm H2O): --  CO: --  CI: --  PA: --  PA(mean): --  PCWP: --  SVR: --  SVRI: --  PVR: --  PVRI: --, ABG - ( 20 Sep 2021 22:01 )  pH, Arterial: 7.39  pH, Blood: x     /  pCO2: 37    /  pO2: 156   / HCO3: 22    / Base Excess: -2.2  /  SaO2: 98.7              Mode: AC/ CMV (Assist Control/ Continuous Mandatory Ventilation)  RR (machine): 12  TV (machine): 700  FiO2: 50  PEEP: 5  ITime: 1  MAP: 9  PIP: 23    Intake and output was reviewed and the fluid balance was calculated  Daily     Daily   I&O's Summary    20 Sep 2021 07:01  -  20 Sep 2021 22:44  --------------------------------------------------------  IN: 3907 mL / OUT: 3575 mL / NET: 332 mL        All lines and drain sites were assessed  Glycemic trend was reviewedCAPILLARY BLOOD GLUCOSE      POCT Blood Glucose.: 118 mg/dL (20 Sep 2021 21:53)    No acute change in focality  Auscultation of the chest reveals equal bs  Abdomen is soft  Extremities are warm and well perfused  Wounds appear clean and unremarkable  Antibiotics are periop    labs  CBC Full  -  ( 20 Sep 2021 22:05 )  WBC Count : 7.01 K/uL  RBC Count : 2.89 M/uL  Hemoglobin : 8.6 g/dL  Hematocrit : 25.2 %  Platelet Count - Automated : 106 K/uL  Mean Cell Volume : 87.2 fl  Mean Cell Hemoglobin : 29.8 pg  Mean Cell Hemoglobin Concentration : 34.1 gm/dL  Auto Neutrophil # : x  Auto Lymphocyte # : x  Auto Monocyte # : x  Auto Eosinophil # : x  Auto Basophil # : x  Auto Neutrophil % : x  Auto Lymphocyte % : x  Auto Monocyte % : x  Auto Eosinophil % : x  Auto Basophil % : x    09-20    144  |  110<H>  |  11  ----------------------------<  127<H>  4.0   |  24  |  0.86    Ca    9.0      20 Sep 2021 22:05  Phos  2.4     09-20  Mg     2.1     09-20    TPro  5.5<L>  /  Alb  4.5  /  TBili  1.1  /  DBili  x   /  AST  27  /  ALT  11  /  AlkPhos  41  09-20    PT/INR - ( 20 Sep 2021 22:05 )   PT: 15.5 sec;   INR: 1.31          PTT - ( 20 Sep 2021 22:05 )  PTT:38.0 sec  The current medications were reviewed   MEDICATIONS  (STANDING):  albumin human  5% IVPB 250 milliLiter(s) IV Intermittent every 30 minutes  albumin human  5% IVPB 250 milliLiter(s) IV Intermittent every 1 hour  albumin human  5% IVPB 250 milliLiter(s) IV Intermittent every 30 minutes  aspirin enteric coated 81 milliGRAM(s) Oral daily  atorvastatin 40 milliGRAM(s) Oral at bedtime  ceFAZolin   IVPB 2000 milliGRAM(s) IV Intermittent every 8 hours  chlorhexidine 0.12% Liquid 15 milliLiter(s) Swish and Spit two times a day  chlorhexidine 2% Cloths 1 Application(s) Topical <User Schedule>  dextrose 50% Injectable 50 milliLiter(s) IV Push every 15 minutes  dextrose 50% Injectable 25 milliLiter(s) IV Push every 15 minutes  DOBUTamine Infusion 5 MICROgram(s)/kG/Min (12.9 mL/Hr) IV Continuous <Continuous>  EPINEPHrine    Infusion 0.2 MICROgram(s)/kG/Min (64.7 mL/Hr) IV Continuous <Continuous>  fentaNYL    Injectable 25 MICROGram(s) IV Push once  influenza   Vaccine 0.5 milliLiter(s) IntraMuscular once  insulin regular Infusion 1 Unit(s)/Hr (1 mL/Hr) IV Continuous <Continuous>  pantoprazole  Injectable 40 milliGRAM(s) IV Push daily  phenylephrine    Infusion 0.2 MICROgram(s)/kG/Min (6.47 mL/Hr) IV Continuous <Continuous>  potassium phosphate IVPB 30 milliMole(s) IV Intermittent once  propofol Infusion 10 MICROgram(s)/kG/Min (5.17 mL/Hr) IV Continuous <Continuous>  sodium chloride 0.9%. 1000 milliLiter(s) (10 mL/Hr) IV Continuous <Continuous>  tamsulosin 0.4 milliGRAM(s) Oral at bedtime    MEDICATIONS  (PRN):       PROBLEM LIST/ ASSESSMENT:  HEALTH ISSUES - PROBLEM Dx:      ,   Patient is a 71y old  Male who presents with a chief complaint of MR (16 Sep 2021 17:23)     s/p cardiac surgery                My plan includes :  close hemodynamic, ventilatory and drain monitoring and management per post op routine    Monitor for arrhythmias and monitor parameters for organ perfusion  beta blockade not administered due to hemodynamic instability and bradycardia  monitor neurologic status  Head of the bed should remain elevated to 45 deg .   chest PT and IS will be encouraged  monitor adequacy of oxygenation and ventilation and attempt to wean oxygen  antibiotic regimen will be tailored to the clinical, laboratory and microbiologic data  Nutritional goals will be met using po eventually , ensure adequate caloric intake and montior the same  Stress ulcer and VTE prophylaxis will be achieved    Glycemic control is satisfactory  Electrolytes have been repleted as necessary and wound care has been carried out. Pain control has been achieved.   agressive physical therapy and early mobility and ambulation goals will be met   The family was updated about the course and plan  CRITICAL CARE TIME personally provided by me  in evaluation and management, reassessments, review and interpretation of labs and x-rays, ventilator and hemodynamic management, formulating a plan and coordinating care: ___90____ MIN.  Time does not include procedural time. Time spent was non routine post-operarive caRE and included multiple and repeated evaluations at the bedside  CTICU ATTENDING     					    Lex Sierra MD                        	
CTICU  CRITICAL  CARE  attending     Hand off received 					   Pertinent clinical, laboratory, radiographic, hemodynamic, echocardiographic, respiratory data, microbiologic data and chart were reviewed and analyzed frequently throughout the course of the day and night  Patient seen and examined with CTS/ SH attending at bedside  Pt is a 71y , Male, HEALTH ISSUES - PROBLEM Dx:      , FAMILY HISTORY:  Family history of stroke (Sibling)    Family history of cerebrovascular accident (CVA) (Sibling, Mother)    Family history of acute myocardial infarction (Sibling)    PAST MEDICAL & SURGICAL HISTORY:  Benign prostatic hypertrophy    HTN (hypertension)    HLD (hyperlipidemia)    DM (diabetes mellitus)    Atherosclerosis of coronary artery  CAD (coronary artery disease)    Type 2 diabetes mellitus  Diabetes    Hyperlipidemia  Hyperlipidemia    Essential hypertension  Hypertension    No significant past surgical history      Patient is a 71y old  Male who presents with a chief complaint of MR (20 Sep 2021 23:34)      14 system review limited by mentation and multiorgan morbidity     Vital signs, hemodynamic and respiratory parameters were reviewed from the bedside nursing flowsheet.  ICU Vital Signs Last 24 Hrs  T(C): 36.6 (21 Sep 2021 14:49), Max: 36.7 (21 Sep 2021 09:56)  T(F): 97.9 (21 Sep 2021 14:49), Max: 98 (21 Sep 2021 09:56)  HR: 95 (21 Sep 2021 16:00) (71 - 99)  BP: 90/50 (20 Sep 2021 17:00) (90/50 - 90/50)  BP(mean): 65 (20 Sep 2021 17:00) (65 - 65)  ABP: 145/61 (21 Sep 2021 16:00) (102/47 - 163/68)  ABP(mean): 84 (21 Sep 2021 16:00) (62 - 92)  RR: 25 (21 Sep 2021 16:00) (12 - 42)  SpO2: 98% (21 Sep 2021 16:00) (93% - 100%)    Adult Advanced Hemodynamics Last 24 Hrs  CVP(mm Hg): 8 (21 Sep 2021 16:00) (5 - 295)  CVP(cm H2O): --  CO: --  CI: --  PA: --  PA(mean): --  PCWP: --  SVR: --  SVRI: --  PVR: --  PVRI: --, ABG - ( 21 Sep 2021 15:11 )  pH, Arterial: 7.42  pH, Blood: x     /  pCO2: 36    /  pO2: 103   / HCO3: 23    / Base Excess: -0.7  /  SaO2: 97.5              Mode: CPAP with PS  FiO2: 50  PEEP: 5  MAP: 9  PIP: 17    Intake and output was reviewed and the fluid balance was calculated  Daily     Daily   I&O's Summary    20 Sep 2021 07:01  -  21 Sep 2021 07:00  --------------------------------------------------------  IN: 5316.4 mL / OUT: 6295 mL / NET: -978.6 mL    21 Sep 2021 07:01  -  21 Sep 2021 16:40  --------------------------------------------------------  IN: 1320.1 mL / OUT: 1185 mL / NET: 135.1 mL        All lines and drain sites were assessed  Glycemic trend was reviewedCAPILLARY BLOOD GLUCOSE      POCT Blood Glucose.: 186 mg/dL (21 Sep 2021 11:18)    No acute change in focality  Auscultation of the chest reveals equal bs  Abdomen is soft  Extremities are warm and well perfused  Wounds appear clean and unremarkable  Antibiotics are periop    labs  CBC Full  -  ( 21 Sep 2021 15:12 )  WBC Count : 10.06 K/uL  RBC Count : 3.13 M/uL  Hemoglobin : 9.3 g/dL  Hematocrit : 27.5 %  Platelet Count - Automated : 92 K/uL  Mean Cell Volume : 87.9 fl  Mean Cell Hemoglobin : 29.7 pg  Mean Cell Hemoglobin Concentration : 33.8 gm/dL  Auto Neutrophil # : x  Auto Lymphocyte # : x  Auto Monocyte # : x  Auto Eosinophil # : x  Auto Basophil # : x  Auto Neutrophil % : x  Auto Lymphocyte % : x  Auto Monocyte % : x  Auto Eosinophil % : x  Auto Basophil % : x    09-21    139  |  105  |  19  ----------------------------<  209<H>  3.6   |  24  |  1.21    Ca    9.1      21 Sep 2021 15:12  Phos  3.0     09-21  Mg     2.1     09-21    TPro  5.8<L>  /  Alb  4.2  /  TBili  1.0  /  DBili  x   /  AST  33  /  ALT  13  /  AlkPhos  42  09-21    PT/INR - ( 21 Sep 2021 15:12 )   PT: 16.2 sec;   INR: 1.37          PTT - ( 21 Sep 2021 15:12 )  PTT:35.7 sec  The current medications were reviewed   MEDICATIONS  (STANDING):  aspirin enteric coated 81 milliGRAM(s) Oral daily  atorvastatin 40 milliGRAM(s) Oral at bedtime  ceFAZolin   IVPB 2000 milliGRAM(s) IV Intermittent every 8 hours  chlorhexidine 2% Cloths 1 Application(s) Topical <User Schedule>  dextrose 40% Gel 15 Gram(s) Oral once  dextrose 5%. 1000 milliLiter(s) (50 mL/Hr) IV Continuous <Continuous>  dextrose 5%. 1000 milliLiter(s) (100 mL/Hr) IV Continuous <Continuous>  dextrose 50% Injectable 50 milliLiter(s) IV Push every 15 minutes  dextrose 50% Injectable 25 milliLiter(s) IV Push every 15 minutes  DOBUTamine Infusion 5 MICROgram(s)/kG/Min (12.9 mL/Hr) IV Continuous <Continuous>  glucagon  Injectable 1 milliGRAM(s) IntraMuscular once  influenza   Vaccine 0.5 milliLiter(s) IntraMuscular once  insulin lispro (ADMELOG) corrective regimen sliding scale   SubCutaneous Before meals and at bedtime  pantoprazole    Tablet 40 milliGRAM(s) Oral before breakfast  polyethylene glycol 3350 17 Gram(s) Oral daily  potassium chloride   Powder 40 milliEquivalent(s) Oral once  sodium chloride 0.9%. 1000 milliLiter(s) (10 mL/Hr) IV Continuous <Continuous>  tamsulosin 0.4 milliGRAM(s) Oral at bedtime    MEDICATIONS  (PRN):  acetaminophen   Tablet .. 650 milliGRAM(s) Oral every 6 hours PRN Mild Pain (1 - 3)  oxyCODONE    IR 5 milliGRAM(s) Oral every 6 hours PRN Moderate Pain (4 - 6)       PROBLEM LIST/ ASSESSMENT:  HEALTH ISSUES - PROBLEM Dx:      ,   Patient is a 71y old  Male who presents with a chief complaint of MR (20 Sep 2021 23:34)     s/p cardiac surgery                My plan includes :  close hemodynamic, ventilatory and drain monitoring and management per post op routine    Monitor for arrhythmias and monitor parameters for organ perfusion  beta blockade not administered due to hemodynamic instability and bradycardia  monitor neurologic status  Head of the bed should remain elevated to 45 deg .   chest PT and IS will be encouraged  monitor adequacy of oxygenation and ventilation and attempt to wean oxygen  antibiotic regimen will be tailored to the clinical, laboratory and microbiologic data  Nutritional goals will be met using po eventually , ensure adequate caloric intake and montior the same  Stress ulcer and VTE prophylaxis will be achieved    Glycemic control is satisfactory  Electrolytes have been repleted as necessary and wound care has been carried out. Pain control has been achieved.   agressive physical therapy and early mobility and ambulation goals will be met   The family was updated about the course and plan  CRITICAL CARE TIME personally provided by me  in evaluation and management, reassessments, review and interpretation of labs and x-rays, ventilator and hemodynamic management, formulating a plan and coordinating care: ___90____ MIN.  Time does not include procedural time. Time spent was non routine post-operarive caRE and included multiple and repeated evaluations at the bedside  CTICU ATTENDING     					    Lex Sierra MD                        	
CTICU  CRITICAL  CARE  attending     Hand off received 					   Pertinent clinical, laboratory, radiographic, hemodynamic, echocardiographic, respiratory data, microbiologic data and chart were reviewed and analyzed frequently throughout the course of the day and night  Patient seen and examined with CTS/ SH attending at bedside  Pt is a 71y , Male, HEALTH ISSUES - PROBLEM Dx:      , FAMILY HISTORY:  Family history of stroke (Sibling)    Family history of cerebrovascular accident (CVA) (Sibling, Mother)    Family history of acute myocardial infarction (Sibling)    PAST MEDICAL & SURGICAL HISTORY:  Benign prostatic hypertrophy    HTN (hypertension)    HLD (hyperlipidemia)    DM (diabetes mellitus)    Atherosclerosis of coronary artery  CAD (coronary artery disease)    Type 2 diabetes mellitus  Diabetes    Hyperlipidemia  Hyperlipidemia    Essential hypertension  Hypertension    No significant past surgical history      Patient is a 71y old  Male who presents with a chief complaint of MR (23 Sep 2021 22:40)      14 system review limited by mentation and multiorgan morbidity     Vital signs, hemodynamic and respiratory parameters were reviewed from the bedside nursing flowsheet.  ICU Vital Signs Last 24 Hrs  T(C): 36.8 (24 Sep 2021 09:00), Max: 36.9 (23 Sep 2021 17:09)  T(F): 98.3 (24 Sep 2021 09:00), Max: 98.5 (23 Sep 2021 17:09)  HR: 64 (24 Sep 2021 12:00) (58 - 121)  BP: 129/58 (24 Sep 2021 10:15) (84/58 - 129/58)  BP(mean): 84 (24 Sep 2021 10:15) (66 - 84)  ABP: 117/48 (24 Sep 2021 12:00) (91/58 - 156/81)  ABP(mean): 70 (24 Sep 2021 12:00) (68 - 103)  RR: 20 (24 Sep 2021 12:00) (16 - 32)  SpO2: 94% (24 Sep 2021 12:00) (90% - 100%)    Adult Advanced Hemodynamics Last 24 Hrs  CVP(mm Hg): --  CVP(cm H2O): --  CO: --  CI: --  PA: --  PA(mean): --  PCWP: --  SVR: --  SVRI: --  PVR: --  PVRI: --, ABG - ( 24 Sep 2021 02:18 )  pH, Arterial: 7.41  pH, Blood: x     /  pCO2: 35    /  pO2: 94    / HCO3: 22    / Base Excess: -1.9  /  SaO2: 98.5                Intake and output was reviewed and the fluid balance was calculated  Daily     Daily   I&O's Summary    23 Sep 2021 07:01  -  24 Sep 2021 07:00  --------------------------------------------------------  IN: 2026.5 mL / OUT: 2790 mL / NET: -763.5 mL    24 Sep 2021 07:01  -  24 Sep 2021 12:17  --------------------------------------------------------  IN: 0 mL / OUT: 475 mL / NET: -475 mL        All lines and drain sites were assessed  Glycemic trend was [facility]      POCT Blood Glucose.: 241 mg/dL (24 Sep 2021 11:06)    No acute change in focality  Auscultation of the chest reveals equal bs  Abdomen is soft  Extremities are warm and well perfused  Wounds appear clean and unremarkable  Antibiotics are periop    labs  CBC Full  -  ( 24 Sep 2021 11:28 )  WBC Count : 6.93 K/uL  RBC Count : 2.81 M/uL  Hemoglobin : 8.3 g/dL  Hematocrit : 24.8 %  Platelet Count - Automated : 124 K/uL  Mean Cell Volume : 88.3 fl  Mean Cell Hemoglobin : 29.5 pg  Mean Cell Hemoglobin Concentration : 33.5 gm/dL  Auto Neutrophil # : x  Auto Lymphocyte # : x  Auto Monocyte # : x  Auto Eosinophil # : x  Auto Basophil # : x  Auto Neutrophil % : x  Auto Lymphocyte % : x  Auto Monocyte % : x  Auto Eosinophil % : x  Auto Basophil % : x    09-24    135  |  106  |  22  ----------------------------<  160<H>  4.1   |  23  |  0.96    Ca    8.1<L>      24 Sep 2021 02:32  Phos  2.3     09-24  Mg     2.2     09-24    TPro  5.3<L>  /  Alb  3.3  /  TBili  0.7  /  DBili  x   /  AST  38  /  ALT  20  /  AlkPhos  62  09-24    PT/INR - ( 24 Sep 2021 02:32 )   PT: 11.4 sec;   INR: 0.95          PTT - ( 24 Sep 2021 02:32 )  PTT:28.9 sec  The current medications were reviewed   MEDICATIONS  (STANDING):  aMIOdarone    Tablet 400 milliGRAM(s) Oral every 8 hours  aMIOdarone    Tablet   Oral   aspirin enteric coated 81 milliGRAM(s) Oral daily  atorvastatin 40 milliGRAM(s) Oral at bedtime  chlorhexidine 2% Cloths 1 Application(s) Topical <User Schedule>  dextrose 40% Gel 15 Gram(s) Oral once  dextrose 5%. 1000 milliLiter(s) (50 mL/Hr) IV Continuous <Continuous>  dextrose 5%. 1000 milliLiter(s) (100 mL/Hr) IV Continuous <Continuous>  dextrose 50% Injectable 50 milliLiter(s) IV Push every 15 minutes  dextrose 50% Injectable 25 milliLiter(s) IV Push every 15 minutes  glucagon  Injectable 1 milliGRAM(s) IntraMuscular once  heparin   Injectable 2500 Unit(s) SubCutaneous every 12 hours  influenza   Vaccine 0.5 milliLiter(s) IntraMuscular once  insulin lispro (ADMELOG) corrective regimen sliding scale   SubCutaneous Before meals and at bedtime  metoprolol tartrate 25 milliGRAM(s) Oral every 12 hours  pantoprazole    Tablet 40 milliGRAM(s) Oral before breakfast  polyethylene glycol 3350 17 Gram(s) Oral daily  sodium chloride 0.9%. 1000 milliLiter(s) (10 mL/Hr) IV Continuous <Continuous>  tamsulosin 0.4 milliGRAM(s) Oral at bedtime    MEDICATIONS  (PRN):  acetaminophen   Tablet .. 650 milliGRAM(s) Oral every 6 hours PRN Mild Pain (1 - 3)  oxyCODONE    IR 5 milliGRAM(s) Oral every 6 hours PRN Moderate Pain (4 - 6)       PROBLEM LIST/ ASSESSMENT:  HEALTH ISSUES - PROBLEM Dx:      ,   Patient is a 71y old  Male who presents with a chief complaint of MR (23 Sep 2021 22:40)     s/p cardiac surgery                My plan includes :  close hemodynamic, ventilatory and drain monitoring and management per post op routine    Monitor for arrhythmias and monitor parameters for organ perfusion  beta blockade not administered due to hemodynamic instability and bradycardia  monitor neurologic status  Head of the bed should remain elevated to 45 deg .   chest PT and IS will be encouraged  monitor adequacy of oxygenation and ventilation and attempt to wean oxygen  antibiotic regimen will be tailored to the clinical, laboratory and microbiologic data  Nutritional goals will be met using po eventually , ensure adequate caloric intake and montior the same  Stress ulcer and VTE prophylaxis will be achieved    Glycemic control is satisfactory  Electrolytes have been repleted as necessary and wound care has been carried out. Pain control has been achieved.   agressive physical therapy and early mobility and ambulation goals will be met   The family was updated about the course and plan  CRITICAL CARE TIME personally provided by me  in evaluation and management, reassessments, review and interpretation of labs and x-rays, ventilator and hemodynamic management, formulating a plan and coordinating care: ___90____ MIN.  Time does not include procedural time. Time spent was non routine post-operarive caRE and included multiple and repeated evaluations at the bedside  CTICU ATTENDING     					    Lex Sierra MD                        	
procedure of right pleural pigtail attempted by me under dynamic usn guidance using 22 ga micropuncture needle in attempt to drain posterior loculated collection   dry tap obtained and procedure aborted  pt tolerated attempt safely   post procedure vitals montiored x 4 hrs including serial xrays and cbc and transaminases lactate

## 2021-09-24 NOTE — PROGRESS NOTE ADULT - ASSESSMENT
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  He initially presented to Dr. Fabian for cardiac clearance prior to knee replacement surgery with ECHO on 7/22/21 showing EF 50-55%, hypokinetic and aneurysmal LV apex and severe MR.  He was referred to Dr. Jarquin for surgical evaluation and deemed a surgical candidate after completion of workup.  On 9/20/21 he underwent uncomplicated minimally invasive MV repair.  He arrived to CTICU on Epi.  He was noted to have acute post-op blood loss anemia and was transfused 1u PRBC after arrival to [facility].  ECHO performed showing small pericardial effusion and Epi was transitioned to Dobutamine.  Right posterior pigtail attempted to posterior collection but aborted 2/2 no drainage.  Extubated that evening.  On POD 1, dobutamine weaned off, BB started, diuresis.  Persistent right sided opacity on CXR, stable.  CT done showing no drainable pocket.  Afib in PM, amiodarone IVPB x 3 given and started on PO Amio load, converted to NSR.  On POD 2, he remained in NSR, chest tube removed and patient transferred to floor care.     ==== Neurovascular ====  -No delirium, pain well managed on current regimen  -C/w PRNs for Pain control  -Monitor neuro status    ==== Respiratory ====  1. Stable right opacity  -s/p CT chest, no drainable effusion  -Saturates well on 6L NC, wean as tolerated.      -AM CXR stable, PA/Lat in AM  -Encourage IS 10x/hour while awake, Cough and deep breathing exercises  -Monitor respiratory status via SpO2    ==== Cardiovascular ====  1. Severe MR  -POD 2 s/p Mini MV repair.  EF 55%  -C/w ASA, statin, BB (titrate as tolerated)  -Monitor HR/BP/Tele    2. Post-op Afib  -Now in NSR  -C/w BB, Amiodarone PO load  -Deferring AC 2/2 risk of bleeding    3. HTN  -BB, titrate as tolerated    4. HLD  -Statin    ==== GI ====   -Tolerating PO  -Prophylaxis: Protonix  -C/w bowel regimen    ==== /Renal ====  -BUN/Cr: 22/0.96  -Trend Cr on AM labs  -Replete electrolytes as needed    ==== ID ====   Afebrile, asymptomatic  -WBC: 6  -Continue to monitor for SIRS/Sepsis syndrome while inpatient    ==== Endocrine ====   -A1C: 6.2; hx of NIDDM.  C/w mISS, trend FS.   -TSH: 1.760    ==== Hematologic ====   1. Acute blood loss anemia  -Resolved  -H/H: 8/24, stable.   -CBC, chem in AM  -DVT ppx: HSQ 2500u q12h and SCDs    ==== Disposition Planning ====  Home when medically appropriate.

## 2021-09-25 ENCOUNTER — TRANSCRIPTION ENCOUNTER (OUTPATIENT)
Age: 71
End: 2021-09-25

## 2021-09-25 VITALS
HEART RATE: 70 BPM | TEMPERATURE: 98 F | SYSTOLIC BLOOD PRESSURE: 146 MMHG | OXYGEN SATURATION: 93 % | DIASTOLIC BLOOD PRESSURE: 65 MMHG | RESPIRATION RATE: 16 BRPM

## 2021-09-25 LAB — GLUCOSE BLDC GLUCOMTR-MCNC: 146 MG/DL — HIGH (ref 70–99)

## 2021-09-25 RX ORDER — TAMSULOSIN HYDROCHLORIDE 0.4 MG/1
1 CAPSULE ORAL
Qty: 30 | Refills: 0
Start: 2021-09-25 | End: 2021-10-24

## 2021-09-25 RX ORDER — METFORMIN HYDROCHLORIDE 850 MG/1
1 TABLET ORAL
Qty: 0 | Refills: 0 | DISCHARGE

## 2021-09-25 RX ORDER — POLYETHYLENE GLYCOL 3350 17 G/17G
17 POWDER, FOR SOLUTION ORAL
Qty: 1 | Refills: 0
Start: 2021-09-25 | End: 2021-10-24

## 2021-09-25 RX ORDER — METFORMIN HYDROCHLORIDE 850 MG/1
1 TABLET ORAL
Qty: 60 | Refills: 0
Start: 2021-09-25 | End: 2021-10-24

## 2021-09-25 RX ORDER — ATORVASTATIN CALCIUM 80 MG/1
1 TABLET, FILM COATED ORAL
Qty: 30 | Refills: 0
Start: 2021-09-25 | End: 2021-10-24

## 2021-09-25 RX ORDER — PANTOPRAZOLE SODIUM 20 MG/1
1 TABLET, DELAYED RELEASE ORAL
Qty: 30 | Refills: 0
Start: 2021-09-25 | End: 2021-10-24

## 2021-09-25 RX ORDER — ACETAMINOPHEN 500 MG
1 TABLET ORAL
Qty: 15 | Refills: 0
Start: 2021-09-25 | End: 2021-09-29

## 2021-09-25 RX ORDER — ASPIRIN/CALCIUM CARB/MAGNESIUM 324 MG
1 TABLET ORAL
Qty: 30 | Refills: 11
Start: 2021-09-25 | End: 2022-09-19

## 2021-09-25 RX ORDER — FUROSEMIDE 40 MG
1 TABLET ORAL
Qty: 30 | Refills: 0
Start: 2021-09-25 | End: 2021-10-24

## 2021-09-25 RX ORDER — FUROSEMIDE 40 MG
20 TABLET ORAL DAILY
Refills: 0 | Status: DISCONTINUED | OUTPATIENT
Start: 2021-09-25 | End: 2021-09-25

## 2021-09-25 RX ORDER — METOPROLOL TARTRATE 50 MG
1 TABLET ORAL
Qty: 60 | Refills: 0
Start: 2021-09-25 | End: 2021-10-24

## 2021-09-25 RX ORDER — POTASSIUM CHLORIDE 20 MEQ
1 PACKET (EA) ORAL
Qty: 30 | Refills: 0
Start: 2021-09-25 | End: 2021-10-24

## 2021-09-25 RX ORDER — AMIODARONE HYDROCHLORIDE 400 MG/1
1 TABLET ORAL
Qty: 30 | Refills: 0
Start: 2021-09-25 | End: 2021-10-24

## 2021-09-25 RX ORDER — LISINOPRIL/HYDROCHLOROTHIAZIDE 10-12.5 MG
1 TABLET ORAL
Qty: 0 | Refills: 0 | DISCHARGE

## 2021-09-25 RX ADMIN — SODIUM CHLORIDE 3 MILLILITER(S): 9 INJECTION INTRAMUSCULAR; INTRAVENOUS; SUBCUTANEOUS at 05:52

## 2021-09-25 RX ADMIN — Medication 25 MILLIGRAM(S): at 05:07

## 2021-09-25 RX ADMIN — AMIODARONE HYDROCHLORIDE 400 MILLIGRAM(S): 400 TABLET ORAL at 05:07

## 2021-09-25 RX ADMIN — SODIUM CHLORIDE 3 MILLILITER(S): 9 INJECTION INTRAMUSCULAR; INTRAVENOUS; SUBCUTANEOUS at 14:10

## 2021-09-25 RX ADMIN — Medication 81 MILLIGRAM(S): at 12:14

## 2021-09-25 RX ADMIN — HEPARIN SODIUM 2500 UNIT(S): 5000 INJECTION INTRAVENOUS; SUBCUTANEOUS at 05:08

## 2021-09-25 RX ADMIN — POLYETHYLENE GLYCOL 3350 17 GRAM(S): 17 POWDER, FOR SOLUTION ORAL at 12:14

## 2021-09-25 RX ADMIN — PANTOPRAZOLE SODIUM 40 MILLIGRAM(S): 20 TABLET, DELAYED RELEASE ORAL at 06:53

## 2021-09-25 RX ADMIN — Medication 20 MILLIGRAM(S): at 12:14

## 2021-09-25 RX ADMIN — AMIODARONE HYDROCHLORIDE 400 MILLIGRAM(S): 400 TABLET ORAL at 14:23

## 2021-09-25 NOTE — DISCHARGE NOTE PROVIDER - NSDCFUADDINST_GEN_ALL_CORE_FT
-Please follow up with Dr. Jarquin on 10/5/21. The office is located at [facility], [facility], 4th floor. Call us with any questions #[phone].    -Walk daily as tolerated and use your incentive spirometer every hour.    -No driving or strenuous activity/exercise for 6 weeks, or until cleared by your surgeon.    -Gently clean your incisions with anti-bacterial soap and water, pat dry.  You may leave them open to air.    -Call your doctor if you have shortness of breath, chest pain not relieved by pain medication, dizziness, fever >101.5, or increased redness or drainage from incisions.

## 2021-09-25 NOTE — DISCHARGE NOTE PROVIDER - CARE PROVIDER_API CALL
Ramses Jarquin (MD)  Cardiovascular Surgery  [address], 4th Floor  [place], [place] [address]  Phone: [phone]  Fax: [phone]  Follow Up Time:

## 2021-09-25 NOTE — DISCHARGE NOTE NURSING/CASE MANAGEMENT/SOCIAL WORK - PATIENT PORTAL LINK FT
You can access the FollowMyHealth Patient Portal offered by [facility] by registering at the following website: [email or web address]. By joining [facility]’s FollowMyHealth portal, you will also be able to view your health information using other applications (apps) compatible with our system.

## 2021-09-25 NOTE — DISCHARGE NOTE PROVIDER - NSDCFUADDAPPT_GEN_ALL_CORE_FT
- follow up with Dr. Jarquin on 10/5/21 Please contact Dr. Jarquin's office to confirm the appointment   - repeat CXR when patient visited Dr. Jarquin's office

## 2021-09-25 NOTE — DISCHARGE NOTE PROVIDER - HOSPITAL COURSE
70 y/o Indonesian/English speaking male, former smoker, presents w/ PMHx of HTN, HLD, DM, tonsil cancer s/p surgery + radiation in 2019 and CAD (s/p PCI 2018) with severe mitral valve insufficiency. He presented to his cardiologist, Dr. Fabian, for further evaluation of mitral regurgitation and cardiac clearance prior to knee replacement surgery. TTE (7/22/21): LVEF 50-55%, LV apex hypokinetic and aneurysmal, severe MR, minimal TR. Denies CP, SOB, palpitations, dizziness/syncope, abdominal pain, N/V, fever/chillls, LE edema, orthopnea, PND, exposure to COVID positive people, cough, loss of taste/smell. He has received covid vaccine x 2. 9/10/21 cardiac cath revealed patent LAD and RPDA stents.      On 9/20/21, patient was taken to the operation room; repair of mitral valve was performed.  Patient tolerated the procedures well.  For further details, please refer the operation report.  Postoperatively, patient continued his care in the cardiac intensive unit.  Overnight, there was no significant event.  On POD#1, patient underwent thoracentesis without obtainable fluid. 70 y/o Icelandic/English speaking male, former smoker, presents w/ PMHx of HTN, HLD, DM, tonsil cancer s/p surgery + radiation in 2019 and CAD (s/p PCI 2018) with severe mitral valve insufficiency. He presented to his cardiologist, Dr. Fabian, for further evaluation of mitral regurgitation and cardiac clearance prior to knee replacement surgery. TTE (7/22/21): LVEF 50-55%, LV apex hypokinetic and aneurysmal, severe MR, minimal TR. Denies CP, SOB, palpitations, dizziness/syncope, abdominal pain, N/V, fever/chillls, LE edema, orthopnea, PND, exposure to COVID positive people, cough, loss of taste/smell. He has received covid vaccine x 2. 9/10/21 cardiac cath revealed patent LAD and RPDA stents.      On 9/20/21, patient was taken to the operation room; repair of mitral valve was performed.  Patient tolerated the procedures well.  For further details, please refer the operation report.  Postoperatively, patient continued his care in the cardiac intensive unit.  Overnight, there was no significant event.  On POD#1, patient underwent thoracentesis without obtainable fluid.  ON POD#2, patient started beta bl;ocker when he had weaned of supporting drips.  Diuresis is 72 y/o Spanish/English speaking male, former smoker, presents w/ PMHx of HTN, HLD, DM, tonsil cancer s/p surgery + radiation in 2019 and CAD (s/p PCI 2018) with severe mitral valve insufficiency. He presented to his cardiologist, Dr. Fabian, for further evaluation of mitral regurgitation and cardiac clearance prior to knee replacement surgery. TTE (7/22/21): LVEF 50-55%, LV apex hypokinetic and aneurysmal, severe MR, minimal TR. Denies CP, SOB, palpitations, dizziness/syncope, abdominal pain, N/V, fever/chillls, LE edema, orthopnea, PND, exposure to COVID positive people, cough, loss of taste/smell. He has received covid vaccine x 2. 9/10/21 cardiac cath revealed patent LAD and RPDA stents.      On 9/20/21, patient was taken to the operation room; repair of mitral valve was performed.  Patient tolerated the procedures well.  For further details, please refer the operation report.  Postoperatively, patient continued his care in the cardiac intensive unit.  Overnight, there was no significant event.  On POD#1, patient underwent thoracentesis without obtainable fluid.  ON POD#2, patient started beta bl;ocker when he had weaned of supporting drips.  Diuresis is initiated.  On POD#3, patient underwent a chest CT scan; there was right hemathorax identified.  Anterior chest tube was then removed.  Patient tolerated the process well.  He developed post op a-fib.  He was loaded with amiodarone bolus and oral loading.  He continues to do well.  On POD#4, his remaining chest tube was removed without difficulty.  Patient was stepped down and continues to do well.  On POD#5, patient is hemodynamically stable.  He is afebrile and weaned off his oxygen supplement.  Patient's ambiodarone dose has been decrease to maintenance dose.  Patient dis discharge home for further care.     35 minutes was spent with the patient reviewing the discharge material including medications, follow up appointments, recovery, concerning symptoms, and how to contact their health care providers if they have questions

## 2021-09-25 NOTE — DISCHARGE NOTE PROVIDER - NSDCMRMEDTOKEN_GEN_ALL_CORE_FT
amLODIPine 5 mg oral tablet: 1 tab(s) orally once a day  Aspirin Enteric Coated 81 mg oral delayed release tablet: 1 tab(s) orally once a day  atorvastatin 40 mg oral tablet: 1 tab(s) orally once a day  Flomax 0.4 mg oral capsule: 1 cap(s) orally once a day  lisinopril-hydroCHLOROthiazide 20 mg-25 mg oral tablet: 1 tab(s) orally once a day  magnesium: orally once a day  metFORMIN 500 mg oral tablet: 1 tab(s) orally 2 times a day  HOLD AND RESTART ON 5/18/18  metoprolol extended release 50 mg oral tablet, extended release: 1 tab(s) orally once a day  rivastigmine 1.5 mg oral capsule: 1 cap(s) orally 2 times a day  Thera-Tabs M oral tablet: 1 tab(s) orally once a day   Actamin 325 mg oral tablet: 1 tab(s) orally every 8 hours, As Needed -Mild Pain (1 - 3) MDD:3   amiodarone 200 mg oral tablet: 1 tab(s) orally once a day   Aspirin Enteric Coated 81 mg oral delayed release tablet: 1 tab(s) orally once a day  atorvastatin 40 mg oral tablet: 1 tab(s) orally once a day  Flomax 0.4 mg oral capsule: 1 cap(s) orally once a day  Lasix 20 mg oral tablet: 1 tab(s) orally once a day  magnesium: orally once a day  metFORMIN 500 mg oral tablet: 1 tab(s) orally 2 times a day  HOLD AND RESTART ON 5/18/18  metoprolol tartrate 25 mg oral tablet: 1 tab(s) orally every 12 hours  MiraLax oral powder for reconstitution: 17 gram(s) orally once a day  Potassium Chloride (Eqv-Klor-Con 10) 10 mEq oral tablet, extended release: 1 tab(s) orally once a day (at bedtime)   Protonix 40 mg oral delayed release tablet: 1 tab(s) orally once a day (before a meal)  rivastigmine 1.5 mg oral capsule: 1 cap(s) orally 2 times a day  Thera-Tabs M oral tablet: 1 tab(s) orally once a day

## 2021-09-26 ENCOUNTER — TRANSCRIPTION ENCOUNTER (OUTPATIENT)
Age: 71
End: 2021-09-26

## 2021-09-27 RX ORDER — RIVASTIGMINE TARTRATE 1.5 MG/1
1.5 CAPSULE ORAL
Refills: 0 | Status: DISCONTINUED | COMMUNITY
Start: 2020-10-23 | End: 2021-09-27

## 2021-09-27 RX ORDER — METOPROLOL TARTRATE 25 MG/1
25 TABLET, FILM COATED ORAL
Qty: 60 | Refills: 5 | Status: ACTIVE | COMMUNITY
Start: 2021-09-27

## 2021-09-27 RX ORDER — METFORMIN HYDROCHLORIDE 500 MG/1
500 TABLET, COATED ORAL TWICE DAILY
Qty: 60 | Refills: 1 | Status: ACTIVE | COMMUNITY
Start: 2018-01-27

## 2021-09-27 RX ORDER — AMLODIPINE BESYLATE 5 MG/1
5 TABLET ORAL DAILY
Qty: 30 | Refills: 2 | Status: DISCONTINUED | COMMUNITY
End: 2021-09-27

## 2021-09-28 ENCOUNTER — NON-APPOINTMENT (OUTPATIENT)
Age: 71
End: 2021-09-28

## 2021-09-29 ENCOUNTER — APPOINTMENT (OUTPATIENT)
Dept: CARE COORDINATION | Facility: HOME HEALTH | Age: 71
End: 2021-09-29
Payer: MEDICARE

## 2021-09-29 VITALS — RESPIRATION RATE: 15 BRPM

## 2021-09-29 DIAGNOSIS — I34.0 NONRHEUMATIC MITRAL (VALVE) INSUFFICIENCY: ICD-10-CM

## 2021-09-29 DIAGNOSIS — R19.7 DIARRHEA, UNSPECIFIED: ICD-10-CM

## 2021-09-29 PROCEDURE — 99024 POSTOP FOLLOW-UP VISIT: CPT

## 2021-09-29 RX ORDER — FUROSEMIDE 20 MG/1
20 TABLET ORAL DAILY
Refills: 0 | Status: ACTIVE | COMMUNITY
Start: 2021-09-27

## 2021-09-29 RX ORDER — ATORVASTATIN CALCIUM 40 MG/1
40 TABLET, FILM COATED ORAL
Qty: 30 | Refills: 5 | Status: ACTIVE | COMMUNITY
Start: 2018-05-16

## 2021-09-29 NOTE — PHYSICAL EXAM
[Sclera] : the sclera and conjunctiva were normal [Exaggerated Use Of Accessory Muscles For Inspiration] : no accessory muscle use [Examination Of The Chest] : the chest was normal in appearance [Chest Visual Inspection Thoracic Asymmetry] : no chest asymmetry [Diminished Respiratory Excursion] : normal chest expansion [Abdomen Tenderness] : non-tender [FreeTextEntry1] : abd appears soft, non distended, pt denies pain on palpation performed by spouse, no grimacing noted [Abnormal Walk] : normal gait [Skin Color & Pigmentation] : normal skin color and pigmentation [Skin Turgor] : normal skin turgor [] : no rash [Sensation] : the sensory exam was normal to light touch and pinprick [No Focal Deficits] : no focal deficits [Oriented To Time, Place, And Person] : oriented to person, place, and time [Impaired Insight] : insight and judgment were intact [Affect] : the affect was normal

## 2021-09-29 NOTE — ASSESSMENT
[FreeTextEntry1] : Pt recovering well at home s/p OHS. Reviewed all medications and dosages with pt and pt spouse understanding. Pt has all medications in home and is taking as prescribed. Pain controlled with current medication regimen.  He denies cough, fever, palpitations, LE edema. No further new symptoms, issues or concerns to report at this time. Pt spouse present and providing translation at time of tele visit.  Pt has had intermittent episodes of loose stool x 3 days, no nausea or vomiting, no abd pain, fever or chills reported..  oral mucosa appears moist and she reports adequate urine output. She was instructed to c/ w BRAT diet, frequent oral intake and to report new or worsening symptoms. \par \par

## 2021-09-29 NOTE — HISTORY OF PRESENT ILLNESS
[FreeTextEntry1] : Discharge Date	25-Sep-2021 \par Admission Date	20-Sep-2021 05:17 \par Reason for Admission	MR \par Hospital Course	 \par 72 y/o German/English speaking male, former smoker, presents w/ PMHx of HTN, \par HLD, DM, tonsil cancer s/p surgery + radiation in 2019 and CAD (s/p PCI 2018) \par with severe mitral valve insufficiency. He presented to his cardiologist,  \par Rui, for further evaluation of mitral regurgitation and cardiac clearance \par prior to knee replacement surgery. TTE (7/22/21): LVEF 50-55%, LV apex \par hypokinetic and aneurysmal, severe MR, minimal TR. Denies CP, SOB, \par palpitations, dizziness/syncope, abdominal pain, N/V, fever/chillls, LE edema, \par orthopnea, PND, exposure to COVID positive people, cough, loss of taste/smell. \par He has received covid vaccine x 2. 9/10/21 cardiac cath revealed patent LAD and \par RPDA stents. \par \par

## 2021-09-29 NOTE — ADDENDUM
[FreeTextEntry1] : s/p MIDCAB\par c/w apirin/bb/statin\par c/w tylenol/oxycodone for pain\par c/w lasix 20 mg daily\par  c/w potassium chloride daily\par c/w IS\par follow up with CTU/ Cardiolgy/PCP\par \par Diarrhea\par c/w BRAT diet\par increase oral intake\par monitor urine output/ daily weights\par report new or worsening symptoms\par

## 2021-09-30 DIAGNOSIS — Z98.890 OTHER SPECIFIED POSTPROCEDURAL STATES: ICD-10-CM

## 2021-09-30 DIAGNOSIS — Z09 ENCOUNTER FOR FOLLOW-UP EXAMINATION AFTER COMPLETED TREATMENT FOR CONDITIONS OTHER THAN MALIGNANT NEOPLASM: ICD-10-CM

## 2021-10-01 ENCOUNTER — INPATIENT (INPATIENT)
Facility: HOSPITAL | Age: 71
LOS: 1 days | Discharge: NURSING, REHAB OR LONG-TERM CARE | DRG: 270 | End: 2021-10-03
Attending: INTERNAL MEDICINE | Admitting: INTERNAL MEDICINE
Payer: MEDICARE

## 2021-10-01 ENCOUNTER — TRANSCRIPTION ENCOUNTER (OUTPATIENT)
Age: 71
End: 2021-10-01

## 2021-10-01 VITALS — HEIGHT: 66 IN

## 2021-10-01 DIAGNOSIS — R00.1 BRADYCARDIA, UNSPECIFIED: ICD-10-CM

## 2021-10-01 DIAGNOSIS — Z79.82 LONG TERM (CURRENT) USE OF ASPIRIN: ICD-10-CM

## 2021-10-01 DIAGNOSIS — I97.618 POSTPROCEDURAL HEMORRHAGE OF A CIRCULATORY SYSTEM ORGAN OR STRUCTURE FOLLOWING OTHER CIRCULATORY SYSTEM PROCEDURE: ICD-10-CM

## 2021-10-01 DIAGNOSIS — I51.1 RUPTURE OF CHORDAE TENDINEAE, NOT ELSEWHERE CLASSIFIED: ICD-10-CM

## 2021-10-01 DIAGNOSIS — I10 ESSENTIAL (PRIMARY) HYPERTENSION: ICD-10-CM

## 2021-10-01 DIAGNOSIS — J98.59 OTHER DISEASES OF MEDIASTINUM, NOT ELSEWHERE CLASSIFIED: ICD-10-CM

## 2021-10-01 DIAGNOSIS — E78.5 HYPERLIPIDEMIA, UNSPECIFIED: ICD-10-CM

## 2021-10-01 DIAGNOSIS — I34.0 NONRHEUMATIC MITRAL (VALVE) INSUFFICIENCY: ICD-10-CM

## 2021-10-01 DIAGNOSIS — I25.10 ATHEROSCLEROTIC HEART DISEASE OF NATIVE CORONARY ARTERY WITHOUT ANGINA PECTORIS: ICD-10-CM

## 2021-10-01 DIAGNOSIS — Z87.891 PERSONAL HISTORY OF NICOTINE DEPENDENCE: ICD-10-CM

## 2021-10-01 DIAGNOSIS — E87.2 ACIDOSIS: ICD-10-CM

## 2021-10-01 DIAGNOSIS — I48.91 UNSPECIFIED ATRIAL FIBRILLATION: ICD-10-CM
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
## 2021-10-01 DIAGNOSIS — Z79.84 LONG TERM (CURRENT) USE OF ORAL HYPOGLYCEMIC DRUGS: ICD-10-CM

## 2021-10-01 DIAGNOSIS — Z92.3 PERSONAL HISTORY OF IRRADIATION: ICD-10-CM
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
## 2021-10-01 DIAGNOSIS — D62 ACUTE POSTHEMORRHAGIC ANEMIA: ICD-10-CM

## 2021-10-01 DIAGNOSIS — Z95.5 PRESENCE OF CORONARY ANGIOPLASTY IMPLANT AND GRAFT: ICD-10-CM

## 2021-10-01 DIAGNOSIS — J95.821 ACUTE POSTPROCEDURAL RESPIRATORY FAILURE: ICD-10-CM

## 2021-10-01 DIAGNOSIS — N40.0 BENIGN PROSTATIC HYPERPLASIA WITHOUT LOWER URINARY TRACT SYMPTOMS: ICD-10-CM

## 2021-10-01 DIAGNOSIS — Z82.49 FAMILY HISTORY OF ISCHEMIC HEART DISEASE AND OTHER DISEASES OF THE CIRCULATORY SYSTEM: ICD-10-CM

## 2021-10-01 DIAGNOSIS — E11.9 TYPE 2 DIABETES MELLITUS WITHOUT COMPLICATIONS: ICD-10-CM

## 2021-10-01 DIAGNOSIS — I31.3 PERICARDIAL EFFUSION (NONINFLAMMATORY): ICD-10-CM

## 2021-10-01 DIAGNOSIS — I97.89 OTHER POSTPROCEDURAL COMPLICATIONS AND DISORDERS OF THE CIRCULATORY SYSTEM, NOT ELSEWHERE CLASSIFIED: ICD-10-CM

## 2021-10-01 LAB — GAS PNL BLDV: SIGNIFICANT CHANGE UP

## 2021-10-01 PROCEDURE — 71045 X-RAY EXAM CHEST 1 VIEW: CPT | Mod: 26

## 2021-10-01 PROCEDURE — 93010 ELECTROCARDIOGRAM REPORT: CPT

## 2021-10-01 PROCEDURE — 99291 CRITICAL CARE FIRST HOUR: CPT | Mod: 25

## 2021-10-01 RX ORDER — PANTOPRAZOLE SODIUM 40 MG/1
40 TABLET, DELAYED RELEASE ORAL
Qty: 30 | Refills: 0 | Status: ACTIVE | COMMUNITY

## 2021-10-01 RX ORDER — FUROSEMIDE 40 MG
40 TABLET ORAL ONCE
Refills: 0 | Status: COMPLETED | OUTPATIENT
Start: 2021-10-01 | End: 2021-10-01

## 2021-10-01 RX ORDER — POTASSIUM CHLORIDE 750 MG/1
10 CAPSULE, EXTENDED RELEASE ORAL
Qty: 30 | Refills: 0 | Status: ACTIVE | COMMUNITY

## 2021-10-01 RX ORDER — PIPERACILLIN AND TAZOBACTAM 4; .5 G/20ML; G/20ML
3.38 INJECTION, POWDER, LYOPHILIZED, FOR SOLUTION INTRAVENOUS ONCE
Refills: 0 | Status: COMPLETED | OUTPATIENT
Start: 2021-10-01 | End: 2021-10-01

## 2021-10-01 RX ORDER — AMIODARONE HYDROCHLORIDE 200 MG/1
200 TABLET ORAL DAILY
Qty: 30 | Refills: 1 | Status: ACTIVE | COMMUNITY

## 2021-10-01 RX ORDER — NITROGLYCERIN 6.5 MG
250 CAPSULE, EXTENDED RELEASE ORAL
Qty: 50 | Refills: 0 | Status: DISCONTINUED | OUTPATIENT
Start: 2021-10-01 | End: 2021-10-02

## 2021-10-01 RX ADMIN — PIPERACILLIN AND TAZOBACTAM 200 GRAM(S): 4; .5 INJECTION, POWDER, LYOPHILIZED, FOR SOLUTION INTRAVENOUS at 23:00

## 2021-10-01 RX ADMIN — Medication 75 MICROGRAM(S)/MIN: at 23:13

## 2021-10-01 NOTE — ED CLERICAL - NS ED CLERK NOTE PRE-ARRIVAL INFORMATION; ADDITIONAL PRE-ARRIVAL INFORMATION
This patient is enrolled in the Follow Your [facility] program and has undergone a cardiac surgery procedure within the last 30 days and has active care navigation.   This patient can be followed up by the care navigation team within 24 hours. To arrange close follow-up or to obtain additional clinical information about this patient, please call the contact number above.   Please call the cardiac surgery team once patient is registered at [phone] for consultation PRIOR to disposition decision.  The patient recently underwent a cardiac surgery procedure and the team can assist in acute medical management.

## 2021-10-01 NOTE — ED PROVIDER NOTE - OBJECTIVE STATEMENT
71M w/ PMHx of recent mitral valve repair, HTN, DM, HLD, CAD p/w dyspnea that started suddenly ~2 hours ago.  Pt. was sitting at home when sxs started.  No f/c, sick contacts, CP.  Takes lasix 20mg daily for diuresis.  Per chart, pt. was admitted recently and had a TTE that showed EF of 55%.  Mild pericardial effusion that was unable to be drained per chart note.

## 2021-10-01 NOTE — ED PROVIDER NOTE - PHYSICAL EXAMINATION
GENERAL: Patient awake alert NAD.  HEENT: NC/AT.  LUNGS: B/l crackles, tachypneic to 40s.  CARDIAC: tachycardia, no m/r/g. Hypertensive.  ABDOMEN: Soft, NT, ND, No rebound, guarding.  EXT: No edema. No calf tenderness. CV 2+DP/PT bilaterally.  MSK: No pain with movement, no deformities.  NEURO: A&Ox3. Moving all extremities.  SKIN: Warm and dry. No rash.  PSYCH: Normal affect.

## 2021-10-01 NOTE — ED PROVIDER NOTE - ATTENDING CONTRIBUTION TO CARE
71yr M hx of recent mitral valve replacement via rt lat thoracotomy incision w chest tube placements and pericardial effusions, htn, dm, hl, CAD, post op afib who presented with hypoxia, hypertension and signs of severe malperfusion to the ED on non rebreather with altered mental status wife confirms he has been having diarrhea for 2 days and she gave the "pink bottle" treatment but had little effect. denies fever chills, but he was saying he feels very weak. he is vaccinated against covid, reports no cough, sorethroat, no vomiting, feels SOB but no CP. no abd pain.  on arrival exam pt was hypertensive, tachy to 129, hypoxic even on 15 lit NRB and tachypneic to 30s. pt was immediately started on supplemental oxygen and bipap was initiated by RT. pt gcs is 14 (opens eyes with voice). no facial droop, no unilateral weakness. lung exam notable for diffuse crackles, no murmurs appreciated but difficult to auscultate. soft protuberant abd no ttp. has a rt groin incision scar and rt chest wall incision scar. bedside echo shows circumferential pericardial effusion, diffuse b line bilat, and plethoric IVC, with reduced EF, RV does not appear severely dilated. a bolus and infusion of nitro was started. pt initial blood gas shows a mixed met and resp acidosis w likely met predominance w lact 4.9. pt is afebrile however given diarrhea and signs of malperfusion, cultures sent and empiric zosyn was started. BPs went from 200/110 to 130/90 on 250mcg of nitro. cards was consulted. pt appears less tachyp , ekg notable for LBBB which was present in EKG from sept at [facility]. cxr ordered.   at sign out pt is still on 100% 10/5, rr 35. discussed intubation as possible option if process fails as US also noted consolidated lung on rt side (zosyn started, vanc may be added if deteriorate). discussed CTPE in case patiens is stable enough to go later on. 71yr M hx of recent mitral valve replacement via rt lat thoracotomy incision w chest tube placements and pericardial effusions, htn, dm, hl, CAD, post op afib who presented with hypoxia, hypertension and signs of severe malperfusion to the ED on non rebreather with altered mental status wife confirms he has been having diarrhea for 2 days and she gave the "pink bottle" treatment but had little effect. denies fever chills, but he was saying he feels very weak. he is vaccinated against covid, reports no cough, sorethroat, no vomiting, feels SOB but no CP. no abd pain.  on arrival exam pt was hypertensive, tachy to 129, hypoxic even on 15 lit NRB and tachypneic to 30s. pt was immediately started on supplemental oxygen and bipap was initiated by RT. pt gcs is 14 (opens eyes with voice). no facial droop, no unilateral weakness. lung exam notable for diffuse crackles, no murmurs appreciated but difficult to auscultate. soft protuberant abd no ttp. has a rt groin incision scar and rt chest wall incision scar. bedside echo shows circumferential pericardial effusion, diffuse b line bilat, and plethoric IVC, with reduced EF, RV does not appear severely dilated. a bolus and infusion of nitro was started. pt initial blood gas shows a mixed met and resp acidosis w likely met predominance w lact 4.9. pt is afebrile however given diarrhea and signs of malperfusion, cultures sent and empiric zosyn was started. BPs went from 200/110 to 130/90 on 250mcg of nitro. cards was consulted. considered the possibility of severe mitral valve regurg contributing to a cardiogenic shock picture, however on limited echo views no clear regurgitatnt jet was noted, and no flow around the valve. pt appears less tachyp , ekg notable for LBBB which was present in EKG from sept at [facility]. cxr ordered.   at sign out pt is still on 100% 10/5, rr 35. discussed intubation as possible option if process fails as US also noted consolidated lung on rt side (zosyn started, vanc may be added if deteriorate). discussed CTPE in case patiens is stable enough to go later on.

## 2021-10-01 NOTE — ED ADULT NURSE NOTE - OBJECTIVE STATEMENT
70y/o M coming to the ED in respiratory distress. As per wife, pt was sitting @ home 72y/o M coming to the ED in respiratory distress. As per wife, pt was sitting @ home when he had a sudden onset of SOB [id number] and states he felt like he was dying, takes 20mg lasix. On arrival to the ED, pt was 79% RA, placed on NRB by EMS saturating 100%, 130sHR, 44RR, diaphoretic, unable to speak d/t tachypneic, hypertensive to 180s systolic. Pt denied any chest pain. Pt noted to have B/L LE edema with distended abdomen. Pt placed on heart monitor, noted to tachy to 130s. Pt placed on BiPAP. IV placed, labs collected and sent. Bed placed in lowest position with both side rail up.

## 2021-10-01 NOTE — ED PROVIDER NOTE - CLINICAL SUMMARY MEDICAL DECISION MAKING FREE TEXT BOX
71M p/w dyspnea, found to be hypoxemic (70s on RA), tachypneic to 40s, tachycardic, hypertensive.  Bedside echo w/ b/l B-lines and mild pericardial effusion w/ at least moderately reduced EF.  Pt. started on BIPAP and nitro gtt w/ improvement after ~15 mins.  Will obtain labs, CXR, consult cards, reassess, and likely admit.

## 2021-10-01 NOTE — ED PROVIDER NOTE - CARE PLAN
Principal Discharge DX:	Acute pulmonary edema   1 Principal Discharge DX:	Acute pulmonary edema  Secondary Diagnosis:	Decompensated heart failure

## 2021-10-01 NOTE — ED PROVIDER NOTE - PROGRESS NOTE DETAILS
Attending Renetta:  received so on pt, recent mv replacement, came in in resp distress onbipap nitro gtt, cards fellow saw pt recommended lasix, pt is tachypnic on bipap satting 100%, mottling from before improved, tachy to 140, bp was down trending, stopped nitro gtt earlier, requested we hold lasix, pt now bp in 80's, bs echo reported some decreased eg, pt has b/l acesss, pads on chest, called cards fellow again. pt will need icu level of care. Ford, PGY3 - pt reassessed after signout. hypotensive 85/50. POCUS noted w/ moderate pericardial effusion, no RV end diastolic collapse +global LV hypokinesis. +b/l B lines. pt still mentating. Cardiology fellow Kalyani called, aware, with STEMI transfer currently, agree hold lasix and nitro gtt, reassess w/ levo. no additional specific pressor recommendations at this time. Ford, PGY3 - pt still w/ soft BPs on levophed, discussed w/ CCU agree admit Ford, PGY3 - pt reassessed after signout. hypotensive 85/50. POCUS noted w/ moderate pericardial effusion, no RV end diastolic collapse +global LV hypokinesis and +b/l b lines (previous EF noted 55% on 9/24 per chart). pt mentating. Cardiology fellow Kalyani called, aware, with STEMI transfer currently, agree hold lasix and nitro gtt, reassess w/ levo. no additional specific pressor recommendations at this time.

## 2021-10-02 ENCOUNTER — APPOINTMENT (OUTPATIENT)
Dept: CARDIOTHORACIC SURGERY | Facility: HOSPITAL | Age: 71
End: 2021-10-02

## 2021-10-02 DIAGNOSIS — J81.0 ACUTE PULMONARY EDEMA: ICD-10-CM

## 2021-10-02 LAB
A1C WITH ESTIMATED AVERAGE GLUCOSE RESULT: 5.9 % — HIGH (ref 4–5.6)
ALBUMIN SERPL ELPH-MCNC: 3.5 G/DL — SIGNIFICANT CHANGE UP (ref 3.3–5)
ALBUMIN SERPL ELPH-MCNC: 3.6 G/DL — SIGNIFICANT CHANGE UP (ref 3.3–5)
ALBUMIN SERPL ELPH-MCNC: 3.7 G/DL — SIGNIFICANT CHANGE UP (ref 3.3–5)
ALP SERPL-CCNC: 104 U/L — SIGNIFICANT CHANGE UP (ref 40–120)
ALP SERPL-CCNC: 107 U/L — SIGNIFICANT CHANGE UP (ref 40–120)
ALP SERPL-CCNC: 111 U/L — SIGNIFICANT CHANGE UP (ref 40–120)
ALT FLD-CCNC: 28 U/L — SIGNIFICANT CHANGE UP (ref 10–45)
ALT FLD-CCNC: 29 U/L — SIGNIFICANT CHANGE UP (ref 10–45)
ALT FLD-CCNC: 32 U/L — SIGNIFICANT CHANGE UP (ref 10–45)
ANION GAP SERPL CALC-SCNC: 13 MMOL/L — SIGNIFICANT CHANGE UP (ref 5–17)
ANION GAP SERPL CALC-SCNC: 13 MMOL/L — SIGNIFICANT CHANGE UP (ref 5–17)
ANION GAP SERPL CALC-SCNC: 14 MMOL/L — SIGNIFICANT CHANGE UP (ref 5–17)
APTT BLD: 25.8 SEC — LOW (ref 27.5–35.5)
AST SERPL-CCNC: 25 U/L — SIGNIFICANT CHANGE UP (ref 10–40)
AST SERPL-CCNC: 26 U/L — SIGNIFICANT CHANGE UP (ref 10–40)
AST SERPL-CCNC: 28 U/L — SIGNIFICANT CHANGE UP (ref 10–40)
BASE EXCESS BLDMV CALC-SCNC: -0.7 MMOL/L — SIGNIFICANT CHANGE UP (ref -3–3)
BASE EXCESS BLDMV CALC-SCNC: -1.8 MMOL/L — SIGNIFICANT CHANGE UP (ref -3–3)
BASE EXCESS BLDV CALC-SCNC: -7.8 MMOL/L — LOW (ref -2–2)
BASOPHILS # BLD AUTO: 0.02 K/UL — SIGNIFICANT CHANGE UP (ref 0–0.2)
BASOPHILS NFR BLD AUTO: 0.1 % — SIGNIFICANT CHANGE UP (ref 0–2)
BILIRUB SERPL-MCNC: 0.9 MG/DL — SIGNIFICANT CHANGE UP (ref 0.2–1.2)
BILIRUB SERPL-MCNC: 0.9 MG/DL — SIGNIFICANT CHANGE UP (ref 0.2–1.2)
BILIRUB SERPL-MCNC: 1 MG/DL — SIGNIFICANT CHANGE UP (ref 0.2–1.2)
BLD GP AB SCN SERPL QL: NEGATIVE — SIGNIFICANT CHANGE UP
BUN SERPL-MCNC: 16 MG/DL — SIGNIFICANT CHANGE UP (ref 7–23)
BUN SERPL-MCNC: 16 MG/DL — SIGNIFICANT CHANGE UP (ref 7–23)
BUN SERPL-MCNC: 17 MG/DL — SIGNIFICANT CHANGE UP (ref 7–23)
CA-I SERPL-SCNC: 1.11 MMOL/L — LOW (ref 1.15–1.33)
CALCIUM SERPL-MCNC: 7.7 MG/DL — LOW (ref 8.4–10.5)
CALCIUM SERPL-MCNC: 8.3 MG/DL — LOW (ref 8.4–10.5)
CALCIUM SERPL-MCNC: 8.4 MG/DL — SIGNIFICANT CHANGE UP (ref 8.4–10.5)
CHLORIDE BLDV-SCNC: 94 MMOL/L — LOW (ref 96–108)
CHLORIDE SERPL-SCNC: 96 MMOL/L — SIGNIFICANT CHANGE UP (ref 96–108)
CHLORIDE SERPL-SCNC: 98 MMOL/L — SIGNIFICANT CHANGE UP (ref 96–108)
CHLORIDE SERPL-SCNC: 99 MMOL/L — SIGNIFICANT CHANGE UP (ref 96–108)
CHOLEST SERPL-MCNC: 115 MG/DL — SIGNIFICANT CHANGE UP
CK MB CFR SERPL CALC: 5 NG/ML — SIGNIFICANT CHANGE UP (ref 0–6.7)
CK SERPL-CCNC: 97 U/L — SIGNIFICANT CHANGE UP (ref 30–200)
CO2 BLDMV-SCNC: 24 MMOL/L — SIGNIFICANT CHANGE UP (ref 21–29)
CO2 BLDMV-SCNC: 24 MMOL/L — SIGNIFICANT CHANGE UP (ref 21–29)
CO2 BLDV-SCNC: 21 MMOL/L — LOW (ref 22–26)
CO2 SERPL-SCNC: 19 MMOL/L — LOW (ref 22–31)
CO2 SERPL-SCNC: 21 MMOL/L — LOW (ref 22–31)
CO2 SERPL-SCNC: 22 MMOL/L — SIGNIFICANT CHANGE UP (ref 22–31)
COVID-19 SPIKE DOMAIN AB INTERP: POSITIVE
COVID-19 SPIKE DOMAIN ANTIBODY RESULT: >250 U/ML — HIGH
CREAT SERPL-MCNC: 1 MG/DL — SIGNIFICANT CHANGE UP (ref 0.5–1.3)
CREAT SERPL-MCNC: 1.07 MG/DL — SIGNIFICANT CHANGE UP (ref 0.5–1.3)
CREAT SERPL-MCNC: 1.09 MG/DL — SIGNIFICANT CHANGE UP (ref 0.5–1.3)
EOSINOPHIL # BLD AUTO: 0 K/UL — SIGNIFICANT CHANGE UP (ref 0–0.5)
EOSINOPHIL NFR BLD AUTO: 0 % — SIGNIFICANT CHANGE UP (ref 0–6)
ESTIMATED AVERAGE GLUCOSE: 123 MG/DL — HIGH (ref 68–114)
GAS PNL BLDA: SIGNIFICANT CHANGE UP
GAS PNL BLDMV: SIGNIFICANT CHANGE UP
GAS PNL BLDMV: SIGNIFICANT CHANGE UP
GAS PNL BLDV: 126 MMOL/L — LOW (ref 136–145)
GAS PNL BLDV: SIGNIFICANT CHANGE UP
GLUCOSE BLDC GLUCOMTR-MCNC: 102 MG/DL — HIGH (ref 70–99)
GLUCOSE BLDC GLUCOMTR-MCNC: 114 MG/DL — HIGH (ref 70–99)
GLUCOSE BLDC GLUCOMTR-MCNC: 122 MG/DL — HIGH (ref 70–99)
GLUCOSE BLDC GLUCOMTR-MCNC: 141 MG/DL — HIGH (ref 70–99)
GLUCOSE BLDC GLUCOMTR-MCNC: 161 MG/DL — HIGH (ref 70–99)
GLUCOSE BLDC GLUCOMTR-MCNC: 96 MG/DL — SIGNIFICANT CHANGE UP (ref 70–99)
GLUCOSE BLDV-MCNC: 310 MG/DL — HIGH (ref 70–99)
GLUCOSE SERPL-MCNC: 152 MG/DL — HIGH (ref 70–99)
GLUCOSE SERPL-MCNC: 161 MG/DL — HIGH (ref 70–99)
GLUCOSE SERPL-MCNC: 169 MG/DL — HIGH (ref 70–99)
HCO3 BLDMV-SCNC: 23 MMOL/L — SIGNIFICANT CHANGE UP (ref 20–28)
HCO3 BLDMV-SCNC: 23 MMOL/L — SIGNIFICANT CHANGE UP (ref 20–28)
HCO3 BLDV-SCNC: 20 MMOL/L — LOW (ref 22–29)
HCT VFR BLD CALC: 27.9 % — LOW (ref 39–50)
HCT VFR BLD CALC: 28.5 % — LOW (ref 39–50)
HCT VFR BLDA CALC: 29 % — LOW (ref 39–51)
HDLC SERPL-MCNC: 32 MG/DL — LOW
HGB BLD CALC-MCNC: 9.6 G/DL — LOW (ref 12.6–17.4)
HGB BLD-MCNC: 9.2 G/DL — LOW (ref 13–17)
HGB BLD-MCNC: 9.4 G/DL — LOW (ref 13–17)
HOROWITZ INDEX BLDMV+IHG-RTO: 40 — SIGNIFICANT CHANGE UP
HOROWITZ INDEX BLDMV+IHG-RTO: 44 — SIGNIFICANT CHANGE UP
IMM GRANULOCYTES NFR BLD AUTO: 1.1 % — SIGNIFICANT CHANGE UP (ref 0–1.5)
INR BLD: 1.25 RATIO — HIGH (ref 0.88–1.16)
LACTATE BLDV-MCNC: 4.1 MMOL/L — CRITICAL HIGH (ref 0.7–2)
LIPID PNL WITH DIRECT LDL SERPL: 67 MG/DL — SIGNIFICANT CHANGE UP
LYMPHOCYTES # BLD AUTO: 0.55 K/UL — LOW (ref 1–3.3)
LYMPHOCYTES # BLD AUTO: 3.3 % — LOW (ref 13–44)
MAGNESIUM SERPL-MCNC: 2.2 MG/DL — SIGNIFICANT CHANGE UP (ref 1.6–2.6)
MAGNESIUM SERPL-MCNC: 2.4 MG/DL — SIGNIFICANT CHANGE UP (ref 1.6–2.6)
MAGNESIUM SERPL-MCNC: 3 MG/DL — HIGH (ref 1.6–2.6)
MCHC RBC-ENTMCNC: 28.5 PG — SIGNIFICANT CHANGE UP (ref 27–34)
MCHC RBC-ENTMCNC: 29.2 PG — SIGNIFICANT CHANGE UP (ref 27–34)
MCHC RBC-ENTMCNC: 33 GM/DL — SIGNIFICANT CHANGE UP (ref 32–36)
MCHC RBC-ENTMCNC: 33 GM/DL — SIGNIFICANT CHANGE UP (ref 32–36)
MCV RBC AUTO: 86.4 FL — SIGNIFICANT CHANGE UP (ref 80–100)
MCV RBC AUTO: 88.5 FL — SIGNIFICANT CHANGE UP (ref 80–100)
MONOCYTES # BLD AUTO: 1.15 K/UL — HIGH (ref 0–0.9)
MONOCYTES NFR BLD AUTO: 6.9 % — SIGNIFICANT CHANGE UP (ref 2–14)
NEUTROPHILS # BLD AUTO: 14.69 K/UL — HIGH (ref 1.8–7.4)
NEUTROPHILS NFR BLD AUTO: 88.6 % — HIGH (ref 43–77)
NON HDL CHOLESTEROL: 83 MG/DL — SIGNIFICANT CHANGE UP
NRBC # BLD: 0 /100 WBCS — SIGNIFICANT CHANGE UP (ref 0–0)
NRBC # BLD: 0 /100 WBCS — SIGNIFICANT CHANGE UP (ref 0–0)
O2 CT VFR BLD CALC: 30 MMHG — SIGNIFICANT CHANGE UP (ref 30–65)
O2 CT VFR BLD CALC: 35 MMHG — SIGNIFICANT CHANGE UP (ref 30–65)
PCO2 BLDMV: 36 MMHG — SIGNIFICANT CHANGE UP (ref 30–65)
PCO2 BLDMV: 39 MMHG — SIGNIFICANT CHANGE UP (ref 30–65)
PCO2 BLDV: 48 MMHG — SIGNIFICANT CHANGE UP (ref 42–55)
PH BLDMV: 7.38 — SIGNIFICANT CHANGE UP (ref 7.32–7.45)
PH BLDMV: 7.42 — SIGNIFICANT CHANGE UP (ref 7.32–7.45)
PH BLDV: 7.22 — LOW (ref 7.32–7.43)
PHOSPHATE SERPL-MCNC: 3.3 MG/DL — SIGNIFICANT CHANGE UP (ref 2.5–4.5)
PHOSPHATE SERPL-MCNC: 3.6 MG/DL — SIGNIFICANT CHANGE UP (ref 2.5–4.5)
PHOSPHATE SERPL-MCNC: 3.6 MG/DL — SIGNIFICANT CHANGE UP (ref 2.5–4.5)
PLATELET # BLD AUTO: 323 K/UL — SIGNIFICANT CHANGE UP (ref 150–400)
PLATELET # BLD AUTO: 401 K/UL — HIGH (ref 150–400)
PO2 BLDV: 48 MMHG — HIGH (ref 25–45)
POTASSIUM BLDV-SCNC: 4.1 MMOL/L — SIGNIFICANT CHANGE UP (ref 3.5–5.1)
POTASSIUM SERPL-MCNC: 4.1 MMOL/L — SIGNIFICANT CHANGE UP (ref 3.5–5.3)
POTASSIUM SERPL-MCNC: 4.3 MMOL/L — SIGNIFICANT CHANGE UP (ref 3.5–5.3)
POTASSIUM SERPL-MCNC: 4.5 MMOL/L — SIGNIFICANT CHANGE UP (ref 3.5–5.3)
POTASSIUM SERPL-SCNC: 4.1 MMOL/L — SIGNIFICANT CHANGE UP (ref 3.5–5.3)
POTASSIUM SERPL-SCNC: 4.3 MMOL/L — SIGNIFICANT CHANGE UP (ref 3.5–5.3)
POTASSIUM SERPL-SCNC: 4.5 MMOL/L — SIGNIFICANT CHANGE UP (ref 3.5–5.3)
PROT SERPL-MCNC: 5.7 G/DL — LOW (ref 6–8.3)
PROT SERPL-MCNC: 5.8 G/DL — LOW (ref 6–8.3)
PROT SERPL-MCNC: 6 G/DL — SIGNIFICANT CHANGE UP (ref 6–8.3)
PROTHROM AB SERPL-ACNC: 14.8 SEC — HIGH (ref 10.6–13.6)
RBC # BLD: 3.22 M/UL — LOW (ref 4.2–5.8)
RBC # BLD: 3.23 M/UL — LOW (ref 4.2–5.8)
RBC # FLD: 13.2 % — SIGNIFICANT CHANGE UP (ref 10.3–14.5)
RBC # FLD: 13.6 % — SIGNIFICANT CHANGE UP (ref 10.3–14.5)
RH IG SCN BLD-IMP: POSITIVE — SIGNIFICANT CHANGE UP
RH IG SCN BLD-IMP: POSITIVE — SIGNIFICANT CHANGE UP
SAO2 % BLDMV: 53.7 — LOW (ref 60–90)
SAO2 % BLDMV: 57.8 — LOW (ref 60–90)
SAO2 % BLDV: 71 % — SIGNIFICANT CHANGE UP (ref 67–88)
SARS-COV-2 IGG+IGM SERPL QL IA: >250 U/ML — HIGH
SARS-COV-2 IGG+IGM SERPL QL IA: POSITIVE
SODIUM SERPL-SCNC: 130 MMOL/L — LOW (ref 135–145)
SODIUM SERPL-SCNC: 131 MMOL/L — LOW (ref 135–145)
SODIUM SERPL-SCNC: 134 MMOL/L — LOW (ref 135–145)
TRIGL SERPL-MCNC: 80 MG/DL — SIGNIFICANT CHANGE UP
TROPONIN T, HIGH SENSITIVITY RESULT: 212 NG/L — HIGH (ref 0–51)
TSH SERPL-MCNC: 0.86 UIU/ML — SIGNIFICANT CHANGE UP (ref 0.27–4.2)
WBC # BLD: 16.6 K/UL — HIGH (ref 3.8–10.5)
WBC # BLD: 18.23 K/UL — HIGH (ref 3.8–10.5)
WBC # FLD AUTO: 16.6 K/UL — HIGH (ref 3.8–10.5)
WBC # FLD AUTO: 18.23 K/UL — HIGH (ref 3.8–10.5)

## 2021-10-02 PROCEDURE — 93010 ELECTROCARDIOGRAM REPORT: CPT

## 2021-10-02 PROCEDURE — 36556 INSERT NON-TUNNEL CV CATH: CPT | Mod: 59

## 2021-10-02 PROCEDURE — 99291 CRITICAL CARE FIRST HOUR: CPT

## 2021-10-02 PROCEDURE — 36620 INSERTION CATHETER ARTERY: CPT

## 2021-10-02 PROCEDURE — 93306 TTE W/DOPPLER COMPLETE: CPT | Mod: 26

## 2021-10-02 PROCEDURE — 33025 INCISION OF HEART SAC: CPT | Mod: 79

## 2021-10-02 PROCEDURE — 93503 INSERT/PLACE HEART CATHETER: CPT

## 2021-10-02 PROCEDURE — 71250 CT THORAX DX C-: CPT | Mod: 26

## 2021-10-02 PROCEDURE — 71045 X-RAY EXAM CHEST 1 VIEW: CPT | Mod: 26,76

## 2021-10-02 RX ORDER — AMIODARONE HYDROCHLORIDE 400 MG/1
150 TABLET ORAL ONCE
Refills: 0 | Status: COMPLETED | OUTPATIENT
Start: 2021-10-02 | End: 2021-10-02

## 2021-10-02 RX ORDER — CHLORHEXIDINE GLUCONATE 213 G/1000ML
15 SOLUTION TOPICAL EVERY 12 HOURS
Refills: 0 | Status: DISCONTINUED | OUTPATIENT
Start: 2021-10-02 | End: 2021-10-03

## 2021-10-02 RX ORDER — ACETAMINOPHEN 500 MG
1000 TABLET ORAL ONCE
Refills: 0 | Status: COMPLETED | OUTPATIENT
Start: 2021-10-02 | End: 2021-10-02

## 2021-10-02 RX ORDER — MAGNESIUM SULFATE 500 MG/ML
2 VIAL (ML) INJECTION ONCE
Refills: 0 | Status: COMPLETED | OUTPATIENT
Start: 2021-10-02 | End: 2021-10-02

## 2021-10-02 RX ORDER — DEXTROSE 50 % IN WATER 50 %
50 SYRINGE (ML) INTRAVENOUS
Refills: 0 | Status: DISCONTINUED | OUTPATIENT
Start: 2021-10-02 | End: 2021-10-03

## 2021-10-02 RX ORDER — VASOPRESSIN 20 [USP'U]/ML
0.04 INJECTION INTRAVENOUS
Qty: 50 | Refills: 0 | Status: DISCONTINUED | OUTPATIENT
Start: 2021-10-02 | End: 2021-10-02

## 2021-10-02 RX ORDER — SODIUM CHLORIDE 9 MG/ML
1000 INJECTION INTRAMUSCULAR; INTRAVENOUS; SUBCUTANEOUS
Refills: 0 | Status: DISCONTINUED | OUTPATIENT
Start: 2021-10-02 | End: 2021-10-03

## 2021-10-02 RX ORDER — CALCIUM GLUCONATE 100 MG/ML
1 VIAL (ML) INTRAVENOUS ONCE
Refills: 0 | Status: COMPLETED | OUTPATIENT
Start: 2021-10-02 | End: 2021-10-02

## 2021-10-02 RX ORDER — INSULIN LISPRO 100/ML
VIAL (ML) SUBCUTANEOUS EVERY 6 HOURS
Refills: 0 | Status: DISCONTINUED | OUTPATIENT
Start: 2021-10-02 | End: 2021-10-02

## 2021-10-02 RX ORDER — POTASSIUM CHLORIDE 20 MEQ
10 PACKET (EA) ORAL
Refills: 0 | Status: DISCONTINUED | OUTPATIENT
Start: 2021-10-02 | End: 2021-10-03

## 2021-10-02 RX ORDER — NICARDIPINE HYDROCHLORIDE 30 MG/1
5 CAPSULE, EXTENDED RELEASE ORAL
Qty: 40 | Refills: 0 | Status: DISCONTINUED | OUTPATIENT
Start: 2021-10-02 | End: 2021-10-03

## 2021-10-02 RX ORDER — DEXMEDETOMIDINE HYDROCHLORIDE IN 0.9% SODIUM CHLORIDE 4 UG/ML
0.5 INJECTION INTRAVENOUS
Qty: 200 | Refills: 0 | Status: DISCONTINUED | OUTPATIENT
Start: 2021-10-02 | End: 2021-10-03

## 2021-10-02 RX ORDER — ACETAMINOPHEN 500 MG
650 TABLET ORAL EVERY 6 HOURS
Refills: 0 | Status: DISCONTINUED | OUTPATIENT
Start: 2021-10-02 | End: 2021-10-03

## 2021-10-02 RX ORDER — OXYCODONE HYDROCHLORIDE 5 MG/1
5 TABLET ORAL EVERY 6 HOURS
Refills: 0 | Status: DISCONTINUED | OUTPATIENT
Start: 2021-10-02 | End: 2021-10-03

## 2021-10-02 RX ORDER — INSULIN HUMAN 100 [IU]/ML
3 INJECTION, SOLUTION SUBCUTANEOUS
Qty: 100 | Refills: 0 | Status: DISCONTINUED | OUTPATIENT
Start: 2021-10-02 | End: 2021-10-03

## 2021-10-02 RX ORDER — PANTOPRAZOLE SODIUM 20 MG/1
40 TABLET, DELAYED RELEASE ORAL DAILY
Refills: 0 | Status: DISCONTINUED | OUTPATIENT
Start: 2021-10-02 | End: 2021-10-03

## 2021-10-02 RX ORDER — CHLORHEXIDINE GLUCONATE 213 G/1000ML
5 SOLUTION TOPICAL
Refills: 0 | Status: DISCONTINUED | OUTPATIENT
Start: 2021-10-02 | End: 2021-10-03

## 2021-10-02 RX ORDER — DEXTROSE 50 % IN WATER 50 %
25 SYRINGE (ML) INTRAVENOUS
Refills: 0 | Status: DISCONTINUED | OUTPATIENT
Start: 2021-10-02 | End: 2021-10-03

## 2021-10-02 RX ORDER — CHLORHEXIDINE GLUCONATE 213 G/1000ML
1 SOLUTION TOPICAL AT BEDTIME
Refills: 0 | Status: DISCONTINUED | OUTPATIENT
Start: 2021-10-02 | End: 2021-10-02

## 2021-10-02 RX ORDER — AMIODARONE HYDROCHLORIDE 400 MG/1
1 TABLET ORAL
Qty: 900 | Refills: 0 | Status: DISCONTINUED | OUTPATIENT
Start: 2021-10-02 | End: 2021-10-02

## 2021-10-02 RX ORDER — DIGOXIN 250 MCG
125 TABLET ORAL ONCE
Refills: 0 | Status: DISCONTINUED | OUTPATIENT
Start: 2021-10-02 | End: 2021-10-02

## 2021-10-02 RX ORDER — NOREPINEPHRINE BITARTRATE/D5W 8 MG/250ML
0.05 PLASTIC BAG, INJECTION (ML) INTRAVENOUS
Qty: 8 | Refills: 0 | Status: DISCONTINUED | OUTPATIENT
Start: 2021-10-02 | End: 2021-10-02

## 2021-10-02 RX ORDER — PROPOFOL 10 MG/ML
10 INJECTION, EMULSION INTRAVENOUS
Qty: 500 | Refills: 0 | Status: DISCONTINUED | OUTPATIENT
Start: 2021-10-02 | End: 2021-10-03

## 2021-10-02 RX ORDER — MEPERIDINE HYDROCHLORIDE 50 MG/ML
25 INJECTION INTRAMUSCULAR; INTRAVENOUS; SUBCUTANEOUS ONCE
Refills: 0 | Status: DISCONTINUED | OUTPATIENT
Start: 2021-10-02 | End: 2021-10-03

## 2021-10-02 RX ORDER — ALBUMIN HUMAN 25 %
250 VIAL (ML) INTRAVENOUS ONCE
Refills: 0 | Status: COMPLETED | OUTPATIENT
Start: 2021-10-02 | End: 2021-10-02

## 2021-10-02 RX ORDER — CEFUROXIME AXETIL 250 MG
1500 TABLET ORAL EVERY 8 HOURS
Refills: 0 | Status: DISCONTINUED | OUTPATIENT
Start: 2021-10-02 | End: 2021-10-03

## 2021-10-02 RX ORDER — POLYETHYLENE GLYCOL 3350 17 G/17G
17 POWDER, FOR SOLUTION ORAL DAILY
Refills: 0 | Status: DISCONTINUED | OUTPATIENT
Start: 2021-10-02 | End: 2021-10-03

## 2021-10-02 RX ORDER — NOREPINEPHRINE BITARTRATE/D5W 8 MG/250ML
0.03 PLASTIC BAG, INJECTION (ML) INTRAVENOUS
Qty: 8 | Refills: 0 | Status: DISCONTINUED | OUTPATIENT
Start: 2021-10-02 | End: 2021-10-03

## 2021-10-02 RX ORDER — AMIODARONE HYDROCHLORIDE 400 MG/1
0.5 TABLET ORAL
Qty: 900 | Refills: 0 | Status: DISCONTINUED | OUTPATIENT
Start: 2021-10-02 | End: 2021-10-03

## 2021-10-02 RX ORDER — INFLUENZA VIRUS VACCINE 15; 15; 15; 15 UG/.5ML; UG/.5ML; UG/.5ML; UG/.5ML
0.5 SUSPENSION INTRAMUSCULAR ONCE
Refills: 0 | Status: DISCONTINUED | OUTPATIENT
Start: 2021-10-02 | End: 2021-10-03

## 2021-10-02 RX ORDER — SODIUM CHLORIDE 9 MG/ML
500 INJECTION INTRAMUSCULAR; INTRAVENOUS; SUBCUTANEOUS ONCE
Refills: 0 | Status: COMPLETED | OUTPATIENT
Start: 2021-10-02 | End: 2021-10-02

## 2021-10-02 RX ORDER — DIGOXIN 250 MCG
250 TABLET ORAL ONCE
Refills: 0 | Status: COMPLETED | OUTPATIENT
Start: 2021-10-02 | End: 2021-10-02

## 2021-10-02 RX ADMIN — AMIODARONE HYDROCHLORIDE 600 MILLIGRAM(S): 400 TABLET ORAL at 12:38

## 2021-10-02 RX ADMIN — AMIODARONE HYDROCHLORIDE 16.7 MG/MIN: 400 TABLET ORAL at 22:45

## 2021-10-02 RX ADMIN — Medication 6.56 MICROGRAM(S)/KG/MIN: at 00:48

## 2021-10-02 RX ADMIN — Medication 50 GRAM(S): at 11:03

## 2021-10-02 RX ADMIN — AMIODARONE HYDROCHLORIDE 600 MILLIGRAM(S): 400 TABLET ORAL at 10:38

## 2021-10-02 RX ADMIN — Medication 125 MILLILITER(S): at 12:39

## 2021-10-02 RX ADMIN — Medication 40 MILLIGRAM(S): at 04:51

## 2021-10-02 RX ADMIN — Medication 1000 MILLIGRAM(S): at 17:40

## 2021-10-02 RX ADMIN — CHLORHEXIDINE GLUCONATE 15 MILLILITER(S): 213 SOLUTION TOPICAL at 18:55

## 2021-10-02 RX ADMIN — SODIUM CHLORIDE 500 MILLILITER(S): 9 INJECTION INTRAMUSCULAR; INTRAVENOUS; SUBCUTANEOUS at 12:40

## 2021-10-02 RX ADMIN — Medication 100 MILLIGRAM(S): at 22:44

## 2021-10-02 RX ADMIN — Medication 100 GRAM(S): at 17:40

## 2021-10-02 RX ADMIN — INSULIN HUMAN 3 UNIT(S)/HR: 100 INJECTION, SOLUTION SUBCUTANEOUS at 18:15

## 2021-10-02 RX ADMIN — Medication 400 MILLIGRAM(S): at 17:15

## 2021-10-02 RX ADMIN — INSULIN HUMAN 3 UNIT(S)/HR: 100 INJECTION, SOLUTION SUBCUTANEOUS at 22:45

## 2021-10-02 RX ADMIN — Medication 1: at 12:37

## 2021-10-02 RX ADMIN — AMIODARONE HYDROCHLORIDE 16.7 MG/MIN: 400 TABLET ORAL at 18:15

## 2021-10-02 RX ADMIN — DEXMEDETOMIDINE HYDROCHLORIDE IN 0.9% SODIUM CHLORIDE 11.2 MICROGRAM(S)/KG/HR: 4 INJECTION INTRAVENOUS at 22:45

## 2021-10-02 RX ADMIN — AMIODARONE HYDROCHLORIDE 600 MILLIGRAM(S): 400 TABLET ORAL at 14:49

## 2021-10-02 RX ADMIN — Medication 5.03 MICROGRAM(S)/KG/MIN: at 18:47

## 2021-10-02 RX ADMIN — Medication 250 MICROGRAM(S): at 11:03

## 2021-10-02 NOTE — PRE-ANESTHESIA EVALUATION ADULT - NSANTHAIRWAYFT_ENT_ALL_CORE
Limited exam as patient is currently on CPAP. TMD>3 FB, FROM Cspine, good mouth opening, no loose dentition.

## 2021-10-02 NOTE — CONSULT NOTE ADULT - ASSESSMENT
70 yo M w/ PMH recent MV repair 9/20/21 at [facility] complicated with R hemothorax, Afib not on AC?, HFpEF, CAD, HTN, T2DM, Tonsil cancer s/p removal and radiation who presents with SOB found to have cardiac tamponade now s/p pericardial window w/ drain. EP consulted for tachycardia.     #Aflutter w/ LBBB  EKG likely Aflutter w/ known LBBB, patient had prior EKG similar w/ variable block. \Less likely Atach vs Afib. Patient HDS. Off pressors since drainage.  -Can continue amiodarone gtt with transition to PO load   -Hold for SBP <90   -Hold off anticoag for now   -Repeat echo in am   -Tele   -Mg>2 and K>4 
Assessment/Plan:   71y Male with PMH severe mitral valve insufficiency s/p MV repair 9/20/21, a. fib, CHF, CAD p/w SOB, hypoxia and tachycardia found to have large pericardial effusion on TTE, however limited evaluation for tamponade due to tachycardia.. IR consulted for pericardial drainage. CT chest obtained demonstrating moderate size pericardial effusion.   - Majority of effusion is posterior with difficult, narrow window for percutaneous access and significant risk, however IR can attempt percutaneous drainage   - CT surgery now planning for OR for pericardial window at this time; f/u CT surgery recs  - If patient clinical status changes, IR is available  - d/w primary team

## 2021-10-02 NOTE — CHART NOTE - NSCHARTNOTEFT_GEN_A_CORE
ALAINA VILLEDA  MRN-[id number]    HPI:  CCU Accept Note    ALAINA VILLEDA 71y  Patient is a 71y old Male who presents with a chief complaint of dyspnea.     HPI & Hospital Course:   70 yo German/English speaking male, former smoker, presents w/ PMHx of severe mitral valve insufficiency (recent MV repair 9/20/21), atrial fibrillation, CHF (Lasix 20mg qd), CAD (s/p PCI 2018), HTN, HLD, DM, tonsil cancer s/p surgery + radiation in 2019 with shortness of breath. Onset two hours PTA. Was sitting during this time. Denies chest pain, palpitations, diaphoresis. No F/C/NS/N/V/D. No cough, calf tenderness or increased LE edema. MV repair hospital course was complicated by right hemothorax requiring thoracocentesis, discharged on 9/25/21.     In the ED, patient was initially hypertensive, although became hypotensive with SBP 90s s/p nitroglycerin. Levophed was started for pressor support.     Past Medical History:   Atherosclerosis of coronary artery   Benign prostatic hypertrophy   Essential hypertension   HLD (hyperlipidemia)   HTN (hypertension)   Type 2 diabetes mellitus     Past Surgical History:     Home Medications:  magnesium: orally once a day (20 Sep 2021 06:15)  rivastigmine 1.5 mg oral capsule: 1 cap(s) orally 2 times a day (20 Sep 2021 06:15)  Thera-Tabs M oral tablet: 1 tab(s) orally once a day (20 Sep 2021 06:15)      Current Medications:   MEDICATIONS  (STANDING):  furosemide   Injectable 40 milliGRAM(s) IV Push Once  nitroglycerin  Infusion 250 MICROgram(s)/Min (75 mL/Hr) IV Continuous <Continuous>  norepinephrine Infusion 0.05 MICROgram(s)/kG/Min (6.56 mL/Hr) IV Continuous <Continuous>    MEDICATIONS  (PRN):      Allergies:     Family History:     Social History:     ====================  Vital Signs Last 24 Hrs  T(C): 36.8 (02 Oct 2021 00:24), Max: 36.8 (02 Oct 2021 00:24)  T(F): 98.2 (02 Oct 2021 00:24), Max: 98.2 (02 Oct 2021 00:24)  HR: 135 (02 Oct 2021 00:45) (133 - 139)  BP: 91/67 (02 Oct 2021 00:45) (83/52 - 182/115)  BP(mean): 72 (02 Oct 2021 00:45) (59 - 76)  RR: 28 (02 Oct 2021 00:45) (28 - 44)  SpO2: 100% (02 Oct 2021 00:45) (78% - 100%)    Adult Advanced Hemodynamics Last 24 Hrs  CVP(mm Hg): --  CVP(cm H2O): --  CO: --  CI: --  PA: --  PA(mean): --  PCWP: --  SVR: --  SVRI: --  PVR: --  PVRI: --    Physical Exam:   General: NAD  HEENT: PERRL, EOMI, normal sclera and conjunctiva, no oral lesions  Neck: Supple, no JVD  Lungs: CTA bilaterally  Heart: RRR, normal S1S2, no murmurs/rubs/gallops  Abdomen: Soft, ND/NT  Extremities: 2+ peripheral pulses, no cyanosis/clubbing/edema, full ROM  Skin: Warm, well-perfused  Neuro: A&O x3, no focal deficits      ====================  Labs & Imaging:   CBC Full  -  ( 01 Oct 2021 22:47 )  WBC Count : 18.28 K/uL  RBC Count : 3.78 M/uL  Hemoglobin : 11.0 g/dL  Hematocrit : 33.9 %  Platelet Count - Automated : 535 K/uL  Mean Cell Volume : 89.7 fl  Mean Cell Hemoglobin : 29.1 pg  Mean Cell Hemoglobin Concentration : 32.4 gm/dL  Auto Neutrophil # : x  Auto Lymphocyte # : x  Auto Monocyte # : x  Auto Eosinophil # : x  Auto Basophil # : x  Auto Neutrophil % : x  Auto Lymphocyte % : x  Auto Monocyte % : x  Auto Eosinophil % : x  Auto Basophil % : x    10-01    128<L>  |  94<L>  |  16  ----------------------------<  252<H>  4.0   |  16<L>  |  1.03    Ca    8.7      01 Oct 2021 22:47    TPro  6.8  /  Alb  4.0  /  TBili  1.0  /  DBili  x   /  AST  33  /  ALT  34  /  AlkPhos  125<H>  10-01    PT/INR - ( 01 Oct 2021 22:47 )   PT: 14.2 sec;   INR: 1.19 ratio         PTT - ( 01 Oct 2021 22:47 )  PTT:26.6 sec                ====================  Assessment & Plan:     70 yo German/English speaking male, former smoker, presents w/ PMHx of severe mitral valve insufficiency (recent MV repair 9/20/21), atrial fibrillation, CHF (Lasix 20mg qd), CAD (s/p PCI 2018), HTN, HLD, DM, tonsil cancer s/p surgery + radiation in 2019 with shortness of breath.    Neurology:  AOx3  Denies pain    Respiratory:  Tachypneic  On BiPAP 12/5, wean as tolerated   Titrate FiO2 to maintain SaO2 >95%  Serial ABGs  Recent right hemothorax requiring thoracocentesis 9/25    Cardiology:  BNP 6014, troponin 51, lactate 4.1  On levophed, maintain MAP > 60  Initially hypertensive, although became hypotensive with SBP 90s s/p nitroglycerin.  TTE (7/22/21): LVEF 50-55%, LV apex hypokinetic and aneurysmal, severe MR, minimal TR. Mild pericardial effusion (not drained)    CXR with pulmonary edema with BL pleural effusion   Continue home amiodarone, aspirin, atorvastatin   Hold home metoprolol until patient is more hemodynamically stable   PRN diuresis; goal net negative   Obtain echocardiogram in the morning     GI:  ADAT s/p wean off of BiPAP  Continue home Protonix     Renal:  Metabolic acidosis 7.22/21/16  Hyponatremic in the setting of hyperglycemia  Continue to monitor   Replete electrolytes as needed     Endocrine:  History of T2DM  Hold home metformin   Monitor BG, ISS    Heme:  Hgb/Hct 11.0/33.9  Continue to monitor with daily labs  SQH for DVT ppx     ID:  Afebrile, tachycardic, hypotensive, leukocytosis 18.28  Zosyn (10/2-)   (02 Oct 2021 01:11)      ==========  CCU Course:  ==========     =============================  Vital Signs Last 24 Hrs  T(C): 37.3 (02 Oct 2021 07:30), Max: 37.3 (02 Oct 2021 07:30)  T(F): 99.1 (02 Oct 2021 07:30), Max: 99.1 (02 Oct 2021 07:30)  HR: 130 (02 Oct 2021 12:30) (123 - 141)  BP: 89/60 (02 Oct 2021 06:00) (83/52 - 182/115)  BP(mean): 71 (02 Oct 2021 06:00) (59 - 103)  RR: 33 (02 Oct 2021 12:30) (4 - 44)  SpO2: 93% (02 Oct 2021 12:30) (78% - 100%)  ICU Vital Signs Last 24 Hrs  T(C): 37.3 (02 Oct 2021 07:30), Max: 37.3 (02 Oct 2021 07:30)  T(F): 99.1 (02 Oct 2021 07:30), Max: 99.1 (02 Oct 2021 07:30)  HR: 130 (02 Oct 2021 12:30) (123 - 141)  BP: 89/60 (02 Oct 2021 06:00) (83/52 - 182/115)  BP(mean): 71 (02 Oct 2021 06:00) (59 - 103)  ABP: 142/86 (02 Oct 2021 12:30) (87/57 - 156/86)  ABP(mean): 105 (02 Oct 2021 12:30) (66 - 107)  RR: 33 (02 Oct 2021 12:30) (4 - 44)  SpO2: 93% (02 Oct 2021 12:30) (78% - 100%)    ==============================    ==========  TELEMETRY  ==========    ============================================  MEDICATIONS  (STANDING):  albumin human  5% IVPB 250 milliLiter(s) IV Intermittent once  aMIOdarone Infusion 1 mG/Min (33.3 mL/Hr) IV Continuous <Continuous>  aMIOdarone IVPB 150 milliGRAM(s) IV Intermittent once  aMIOdarone IVPB 150 milliGRAM(s) IV Intermittent once  chlorhexidine 2% Cloths 1 Application(s) Topical at bedtime  influenza   Vaccine 0.5 milliLiter(s) IntraMuscular once  insulin lispro (ADMELOG) corrective regimen sliding scale   SubCutaneous every 6 hours  sodium chloride 0.9% Bolus 500 milliLiter(s) IV Bolus once  vasopressin Infusion 0.04 Unit(s)/Min (2.4 mL/Hr) IV Continuous <Continuous>    MEDICATIONS  (PRN):    ============================================    ==========  FOLLOW UP:  ==========  -      Alexandria Engel CCU NP    # ALAINA VILLEDA 71y  Patient is a 71y old Male who presents with a chief complaint of dyspnea.     HPI & Hospital Course:   70 yo Yakut/English speaking male, former smoker, presents w/ PMHx of severe mitral valve insufficiency (recent MV repair 9/20/21), atrial fibrillation, CHF (Lasix 20mg qd), CAD (s/p PCI 2018), HTN, HLD, DM, tonsil cancer s/p surgery + radiation in 2019 with shortness of breath. Onset two hours PTA. Was sitting during this time. Denies chest pain, palpitations, diaphoresis. No F/C/NS/N/V/D. No cough, calf tenderness or increased LE edema. MV repair hospital course was complicated by right hemothorax requiring thoracocentesis, discharged on 9/25/21.     In the ED, patient was initially hypertensive, although became hypotensive with SBP 90s s/p nitroglycerin. Levophed was started for pressor support.     Pt found to have a mod to large pericardial effusion and a R sided large effusion. CT surgery consulted.     ====================  Vital Signs Last 24 Hrs  T(C): 36.8 (02 Oct 2021 00:24), Max: 36.8 (02 Oct 2021 00:24)  T(F): 98.2 (02 Oct 2021 00:24), Max: 98.2 (02 Oct 2021 00:24)  HR: 135 (02 Oct 2021 00:45) (133 - 139)  BP: 91/67 (02 Oct 2021 00:45) (83/52 - 182/115)  BP(mean): 72 (02 Oct 2021 00:45) (59 - 76)  RR: 28 (02 Oct 2021 00:45) (28 - 44)  SpO2: 100% (02 Oct 2021 00:45) (78% - 100%)    Physical Exam:   General: mildly distressed  HEENT: PERRL, EOMI, normal sclera and conjunctiva, no oral lesions  Neck: Supple, no JVD  Lungs: CTA bilaterally  Heart: RRR, normal S1S2, no murmurs/rubs/gallops. RIJ TLC C/D/I without bleeding, induration  Abdomen: Soft, ND/NT  Extremities: 2+ peripheral pulses, no cyanosis/clubbing/edema, full ROM  Skin: Warm, well-perfused  Neuro: A&O x3, no focal deficits    ====================  Labs & Imaging:   CBC Full  -  ( 01 Oct 2021 22:47 )  WBC Count : 18.28 K/uL  RBC Count : 3.78 M/uL  Hemoglobin : 11.0 g/dL  Hematocrit : 33.9 %  Platelet Count - Automated : 535 K/uL  Mean Cell Volume : 89.7 fl  Mean Cell Hemoglobin : 29.1 pg  Mean Cell Hemoglobin Concentration : 32.4 gm/dL    10-01    128<L>  |  94<L>  |  16  ----------------------------<  252<H>  4.0   |  16<L>  |  1.03    Ca    8.7      01 Oct 2021 22:47    TPro  6.8  /  Alb  4.0  /  TBili  1.0  /  DBili  x   /  AST  33  /  ALT  34  /  AlkPhos  125<H>  10-01    PT/INR - ( 01 Oct 2021 22:47 )   PT: 14.2 sec;   INR: 1.19 ratio         PTT - ( 01 Oct 2021 22:47 )  PTT:26.6 sec    ====================  Assessment & Plan:     70 yo Yakut/English speaking male, former smoker, presents w/ PMHx of severe mitral valve insufficiency (recent MV repair 9/20/21), atrial fibrillation, CHF (Lasix 20mg qd), CAD (s/p PCI 2018), HTN, HLD, DM, tonsil cancer s/p surgery + radiation in 2019 with shortness of breath now with early tamponade and R large effusion.     Neurology:  - Primarily Yakut speaking, no acute pain    Respiratory:  Tachypneic weaned off On BiPAP now on 4 L NC saturating well. CT surgery consulted may need tap of the R lung. Has a history of R hemothorax w. thoracentesis on 9/25 post lexy clip repair.   Serial ABGs    Cardiology:  BNP 6014, troponin 51, lactate 2.1 from 4.1   On levophed, maintain MAP > 65  Initially hypertensive, although became hypotensive with SBP 90s s/p nitroglycerin.  TTE (7/22/21): pericardial effusion w early tamponade  CT surgery following for pericardial drainage.   Hold ASA. Per CT surgery give AMio and start amio gtt for HR AF w RVR 140s.  S/p NS at 500 cc/hour    GI:  NPO, continue PPI    Renal:  Cr and electrolytes stable    Endocrine:  Continue ISS, hold home metformin    Heme:  Hgb/Hct 11.0/33.9, stable. Hold AC    Plan for transfer to CTU once a bed available. Possible [facility] transfer.       Alexandria Engel CCU NP    #[id number]

## 2021-10-02 NOTE — PROGRESS NOTE ADULT - SUBJECTIVE AND OBJECTIVE BOX
CRITICAL CARE ATTENDING - CTICU    MEDICATIONS  (STANDING):  acetaminophen  IVPB .. 1000 milliGRAM(s) IV Intermittent once  cefuroxime  IVPB 1500 milliGRAM(s) IV Intermittent every 8 hours  chlorhexidine 0.12% Liquid 5 milliLiter(s) Oral Mucosa two times a day  chlorhexidine 0.12% Liquid 15 milliLiter(s) Oral Mucosa every 12 hours  dexMEDEtomidine Infusion 0.5 MICROgram(s)/kG/Hr (11.2 mL/Hr) IV Continuous <Continuous>  dextrose 50% Injectable 50 milliLiter(s) IV Push every 15 minutes  dextrose 50% Injectable 25 milliLiter(s) IV Push every 15 minutes  influenza   Vaccine 0.5 milliLiter(s) IntraMuscular once  insulin regular Infusion 3 Unit(s)/Hr (3 mL/Hr) IV Continuous <Continuous>  meperidine     Injectable 25 milliGRAM(s) IV Push once  niCARdipine Infusion 5 mG/Hr (25 mL/Hr) IV Continuous <Continuous>  pantoprazole  Injectable 40 milliGRAM(s) IV Push daily  polyethylene glycol 3350 17 Gram(s) Oral daily  potassium chloride  10 mEq/50 mL IVPB 10 milliEquivalent(s) IV Intermittent every 1 hour  potassium chloride  10 mEq/50 mL IVPB 10 milliEquivalent(s) IV Intermittent every 1 hour  potassium chloride  10 mEq/50 mL IVPB 10 milliEquivalent(s) IV Intermittent every 1 hour  sodium chloride 0.9%. 1000 milliLiter(s) (10 mL/Hr) IV Continuous <Continuous>                                    9.4    16.60 )-----------( 323      ( 02 Oct 2021 17:06 )             28.5       10-02    131<L>  |  98  |  16  ----------------------------<  152<H>  4.1   |  19<L>  |  1.09    Ca    8.3<L>      02 Oct 2021 12:06  Phos  3.6     10-02  Mg     3.0     10-02    TPro  5.7<L>  /  Alb  3.5  /  TBili  1.0  /  DBili  x   /  AST  28  /  ALT  29  /  AlkPhos  107  10-02      PT/INR - ( 01 Oct 2021 22:47 )   PT: 14.2 sec;   INR: 1.19 ratio         PTT - ( 01 Oct 2021 22:47 )  PTT:26.6 sec    Mode: AC/ CMV (Assist Control/ Continuous Mandatory Ventilation)  RR (machine): 12  TV (machine): 500  FiO2: 100  PEEP: 7  ITime: 1  MAP: 10  PIP: 23      Daily Height in cm: 153.92 (02 Oct 2021 14:30)    Daily       10-01 @ 07:01  -  10-02 @ 07:00  --------------------------------------------------------  IN: 38.6 mL / OUT: 775 mL / NET: -736.4 mL    10-02 @ 07:01  -  10-02 @ 17:32  --------------------------------------------------------  IN: 1007.5 mL / OUT: 1450 mL / NET: -442.5 mL        Critically Ill patient  : [ ] preoperative ,   [x ] post operative    Requires :  [x Arterial Line   [ x] Central Line  [x ] PA catheter  [ ] IABP  [ ] ECMO  [ ] LVAD  [x ] Ventilator  [ ] pacemaker [ ] Impella.                      [ x] ABG's     [x ] Pulse Oxymetry Monitoring  Bedside evaluation , monitoring , treatment of hemodynamics , fluids , IVP/ IVCD meds.        Diagnosis:     Op Day - drainage of pericardial fluid     Cardiac Tamponade     A Fib    tachycardia    Post Op hypertension     Hemodynamic lability,  instability. Requires IVCD [ ] vasopressors [ ] inotropes  [ x] vasodilator  [ ]IVSS fluid  to maintain MAP, perfusion, C.I.     s/p Transthoracic MVR last week / R hemothorax - requires decortication     CHF- acute [ x]   chronic [ x]    systolic [x ]   diatolic [ ]          - Echo- EF - 30's            [ ] RV dysfunction          - Cxr-cardiomegally, edema          - Clinical-  [ ]inotropes   [ ]pressors   [ ]diuresis   [ ]IABP   [ ]ECMO   [ ]LVAD   [ ]Respiratory Failure    Ventilator Management:  [ x]AC-rest    [x ]CPAP-PS Wean this PM     [ ]Trach Collar     [ ]Extubate    [ ] T-Piece  [ ]peep>5     Requires chest PT, pulmonary toilet, ambu bagging, suctioning to maintain SaO2,  patent airway and treat atelectasis.     [place] [facility] catheter interpretation and therapeutic management of unstable hemodynamics     Chest Tube Drainage     ECG     Hyponatremia     IVCD Insulin                     -                     Discussed with CT surgeon, Physician's Assistant - Nurse Practitioner- Critical care medicine team.   Discussed at  AM / PM rounds.   Chart, labs , films reviewed.    Cumulative Critical Care Time Given Today : 30 min

## 2021-10-02 NOTE — PROCEDURE NOTE - ADDITIONAL PROCEDURE DETAILS
Date; 10/2/2021  Time; 0500    Under sterile conditions and with proper draping of the patient, a pulmonary artery catheter was floated through the introducer catheter in the ____RIJ________ vein. With the ballon inflated with 1.5ml of air, the PA catheter was advanced while monitoring the pressure tracing from the central venous system to the right ventricle and to the pulmonary artery. Wedge tracing was observed at _ 50____ cm. The balloon was deflated, PA pressure tracing was noted again. The position of the catheter was verified by CXR. The initial readings are:  -CVP=   9  mmHg  -PA sys/kemp=30/20     mmHg  -PCWP=     mmHg  -C.O. =      lit/min  -C.I. =     lit/min/m^2  -PVR =       -SVR =     Complications:none    I certify that a time out took place prior to prep and drape, verbally confirming correct patient identity, correct site and side.

## 2021-10-02 NOTE — PROCEDURE NOTE - NSINDICATIONS_GEN_A_CORE
critical illness/hypertonic/irritant infusion/venous access
arterial puncture to obtain ABG's/blood sampling/critical patient/monitoring purposes

## 2021-10-02 NOTE — CONSULT NOTE ADULT - SUBJECTIVE AND OBJECTIVE BOX
Vascular & Interventional Radiology Brief Consult Note    Evaluate for Procedure: Pericardial drainage    HPI: 71y Male with PMH severe mitral valve insufficiency s/p MV repair 9/20/21, a. fib, CHF, CAD p/w SOB found to have large pericardial effusion on TTE. IR consulted for pericardial drainage.    Allergies:   Medications (Abx/Cardiac/Anticoagulation/Blood Products)    aMIOdarone IVPB: 600 mL/Hr IV Intermittent (10-02 @ 10:38)  aMIOdarone IVPB: 600 mL/Hr IV Intermittent (10-02 @ 14:49)  aMIOdarone IVPB: 600 mL/Hr IV Intermittent (10-02 @ 12:38)  digoxin  Injectable: 250 MICROGram(s) IV Push (10-02 @ 11:03)  furosemide   Injectable: 40 milliGRAM(s) IV Push (10-02 @ 04:51)  nitroglycerin  Infusion: 75 mL/Hr IV Continuous (10-01 @ 22:46)  norepinephrine Infusion: 6.56 mL/Hr IV Continuous (10-01 @ 23:33)  piperacillin/tazobactam IVPB...: 200 mL/Hr IV Intermittent (10-01 @ 23:00)    Data:  153.9  89.5  T(C): 37.4  HR: 127  BP: 89/60  RR: 37  SpO2: 100%    -WBC 16.60 / HgB 9.4 / Hct 28.5 / Plt 323  -Na 134 / Cl 99 / BUN 16 / Glucose 161  -K 4.3 / CO2 22 / Cr 1.00  -ALT 28 / Alk Phos 104 / T.Bili 0.9  -INR1.25    Imaging: Reviewed.

## 2021-10-02 NOTE — CONSULT NOTE ADULT - SUBJECTIVE AND OBJECTIVE BOX
Patient seen and evaluated at bedside    Chief Complaint:    HPI:  CCU Accept Note    ALAINA VILLEDA 71y  Patient is a 71y old Male who presents with a chief complaint of dyspnea.     HPI & Hospital Course:   72 yo Macedonian/English speaking male, former smoker, presents w/ PMHx of severe mitral valve insufficiency (recent MV repair 21), atrial fibrillation, CHF (Lasix 20mg qd), CAD (s/p PCI 2018), HTN, HLD, DM, tonsil cancer s/p surgery + radiation in 2019 with shortness of breath. Onset two hours PTA. Was sitting during this time. Denies chest pain, palpitations, diaphoresis. No F/C/NS/N/V/D. No cough, calf tenderness or increased LE edema. MV repair hospital course was complicated by right hemothorax requiring thoracocentesis, discharged on 21.     In the ED, patient was initially hypertensive, although became hypotensive with SBP 90s s/p nitroglycerin. Levophed was started for pressor support.     72 yo M w/ PMH recent MV repair 21 at [facility] complicated with R hemothorax, Afib not on AC?, HFpEF, CAD, HTN, T2DM, Tonsil cancer s/p removal and radiation who presents with SOB found to be initially hypertensive and started on BiPAP for flash pulm edema. After being started on nitro, patient became hypotensive and needed levophed support. Admitted initially to CCU. Patient on echo showed tamponade and patient also had pulsus paradoxus. Patient was taken urgently to OR for pericardial window with drain in place. EP was consulted for uncontrolled     Past Medical History:   Atherosclerosis of coronary artery   Benign prostatic hypertrophy   Essential hypertension   HLD (hyperlipidemia)   HTN (hypertension)   Type 2 diabetes mellitus     Past Surgical History:     Home Medications:  magnesium: orally once a day (20 Sep 2021 06:15)  rivastigmine 1.5 mg oral capsule: 1 cap(s) orally 2 times a day (20 Sep 2021 06:15)  Thera-Tabs M oral tablet: 1 tab(s) orally once a day (20 Sep 2021 06:15)      Current Medications:   MEDICATIONS  (STANDING):  furosemide   Injectable 40 milliGRAM(s) IV Push Once  nitroglycerin  Infusion 250 MICROgram(s)/Min (75 mL/Hr) IV Continuous <Continuous>  norepinephrine Infusion 0.05 MICROgram(s)/kG/Min (6.56 mL/Hr) IV Continuous <Continuous>    MEDICATIONS  (PRN):      Allergies:     Family History:     Social History:     ====================  Vital Signs Last 24 Hrs  T(C): 36.8 (02 Oct 2021 00:24), Max: 36.8 (02 Oct 2021 00:24)  T(F): 98.2 (02 Oct 2021 00:24), Max: 98.2 (02 Oct 2021 00:24)  HR: 135 (02 Oct 2021 00:45) (133 - 139)  BP: 91/67 (02 Oct 2021 00:45) (83/52 - 182/115)  BP(mean): 72 (02 Oct 2021 00:45) (59 - 76)  RR: 28 (02 Oct 2021 00:45) (28 - 44)  SpO2: 100% (02 Oct 2021 00:45) (78% - 100%)    Adult Advanced Hemodynamics Last 24 Hrs  CVP(mm Hg): --  CVP(cm H2O): --  CO: --  CI: --  PA: --  PA(mean): --  PCWP: --  SVR: --  SVRI: --  PVR: --  PVRI: --    Physical Exam:   General: NAD  HEENT: PERRL, EOMI, normal sclera and conjunctiva, no oral lesions  Neck: Supple, no JVD  Lungs: CTA bilaterally  Heart: RRR, normal S1S2, no murmurs/rubs/gallops  Abdomen: Soft, ND/NT  Extremities: 2+ peripheral pulses, no cyanosis/clubbing/edema, full ROM  Skin: Warm, well-perfused  Neuro: A&O x3, no focal deficits      ====================  Labs & Imaging:   CBC Full  -  ( 01 Oct 2021 22:47 )  WBC Count : 18.28 K/uL  RBC Count : 3.78 M/uL  Hemoglobin : 11.0 g/dL  Hematocrit : 33.9 %  Platelet Count - Automated : 535 K/uL  Mean Cell Volume : 89.7 fl  Mean Cell Hemoglobin : 29.1 pg  Mean Cell Hemoglobin Concentration : 32.4 gm/dL  Auto Neutrophil # : x  Auto Lymphocyte # : x  Auto Monocyte # : x  Auto Eosinophil # : x  Auto Basophil # : x  Auto Neutrophil % : x  Auto Lymphocyte % : x  Auto Monocyte % : x  Auto Eosinophil % : x  Auto Basophil % : x    10-    128<L>  |  94<L>  |  16  ----------------------------<  252<H>  4.0   |  16<L>  |  1.03    Ca    8.7      01 Oct 2021 22:47    TPro  6.8  /  Alb  4.0  /  TBili  1.0  /  DBili  x   /  AST  33  /  ALT  34  /  AlkPhos  125<H>  10    PT/INR - ( 01 Oct 2021 22:47 )   PT: 14.2 sec;   INR: 1.19 ratio         PTT - ( 01 Oct 2021 22:47 )  PTT:26.6 sec                ====================  Assessment & Plan:     72 yo Macedonian/English speaking male, former smoker, presents w/ PMHx of severe mitral valve insufficiency (recent MV repair 21), atrial fibrillation, CHF (Lasix 20mg qd), CAD (s/p PCI ), HTN, HLD, DM, tonsil cancer s/p surgery + radiation in 2019 with shortness of breath.    Neurology:  AOx3  Denies pain    Respiratory:  Tachypneic  On BiPAP 12/5, wean as tolerated   Titrate FiO2 to maintain SaO2 >95%  Serial ABGs  Recent right hemothorax requiring thoracocentesis     Cardiology:  BNP 6014, troponin 51, lactate 4.1  On levophed, maintain MAP > 60  Initially hypertensive, although became hypotensive with SBP 90s s/p nitroglycerin.  TTE (21): LVEF 50-55%, LV apex hypokinetic and aneurysmal, severe MR, minimal TR. Mild pericardial effusion (not drained)    CXR with pulmonary edema with BL pleural effusion   Continue home amiodarone, aspirin, atorvastatin   Hold home metoprolol until patient is more hemodynamically stable   PRN diuresis; goal net negative   Obtain echocardiogram in the morning     GI:  ADAT s/p wean off of BiPAP  Continue home Protonix     Renal:  Metabolic acidosis 7./  Hyponatremic in the setting of hyperglycemia  Continue to monitor   Replete electrolytes as needed     Endocrine:  History of T2DM  Hold home metformin   Monitor BG, ISS    Heme:  Hgb/Hct 11.0/33.9  Continue to monitor with daily labs  SQH for DVT ppx     ID:  Afebrile, tachycardic, hypotensive, leukocytosis 18.28  Zosyn (10/2-)   (02 Oct 2021 01:11)      PMHx:   Benign prostatic hypertrophy    HTN (hypertension)    HLD (hyperlipidemia)    DM (diabetes mellitus)    Atherosclerosis of coronary artery    Type 2 diabetes mellitus    Hyperlipidemia    Essential hypertension        PSHx:   No significant past surgical history        Allergies:  No Known Allergies      Home Meds:    Current Medications:   acetaminophen   Tablet .. 650 milliGRAM(s) Oral every 6 hours PRN  aMIOdarone Infusion 0.5 mG/Min IV Continuous <Continuous>  cefuroxime  IVPB 1500 milliGRAM(s) IV Intermittent every 8 hours  chlorhexidine 0.12% Liquid 5 milliLiter(s) Oral Mucosa two times a day  chlorhexidine 0.12% Liquid 15 milliLiter(s) Oral Mucosa every 12 hours  dexMEDEtomidine Infusion 0.5 MICROgram(s)/kG/Hr IV Continuous <Continuous>  dextrose 50% Injectable 50 milliLiter(s) IV Push every 15 minutes  dextrose 50% Injectable 25 milliLiter(s) IV Push every 15 minutes  influenza   Vaccine 0.5 milliLiter(s) IntraMuscular once  insulin regular Infusion 3 Unit(s)/Hr IV Continuous <Continuous>  meperidine     Injectable 25 milliGRAM(s) IV Push once  niCARdipine Infusion 5 mG/Hr IV Continuous <Continuous>  norepinephrine Infusion 0.03 MICROgram(s)/kG/Min IV Continuous <Continuous>  oxyCODONE    IR 5 milliGRAM(s) Oral every 6 hours PRN  pantoprazole  Injectable 40 milliGRAM(s) IV Push daily  polyethylene glycol 3350 17 Gram(s) Oral daily  potassium chloride  10 mEq/50 mL IVPB 10 milliEquivalent(s) IV Intermittent every 1 hour  potassium chloride  10 mEq/50 mL IVPB 10 milliEquivalent(s) IV Intermittent every 1 hour  potassium chloride  10 mEq/50 mL IVPB 10 milliEquivalent(s) IV Intermittent every 1 hour  sodium chloride 0.9%. 1000 milliLiter(s) IV Continuous <Continuous>      FAMILY HISTORY:  Family history of stroke (Sibling)    Family history of cerebrovascular accident (CVA) (Sibling, Mother)    Family history of acute myocardial infarction (Sibling)        Social History:  Smoking History:  Alcohol Use:  Drug Use:    REVIEW OF SYSTEMS:  Constitutional:     [x ] negative [ ] fevers [ ] chills [ ] weight loss [ ] weight gain  HEENT:                  [x ] negative [ ] dry eyes [ ] eye irritation [ ] postnasal drip [ ] nasal congestion  CV:                         [ x] negative  [ ] chest pain [ ] orthopnea [ ] palpitations [ ] murmur  Resp:                     [x ] negative [ ] cough [ ] shortness of breath [ ] dyspnea [ ] wheezing [ ] sputum [ ]hemoptysis  GI:                          [ x] negative [ ] nausea [ ] vomiting [ ] diarrhea [ ] constipation [ ] abd pain [ ] dysphagia   :                        [ x] negative [ ] dysuria [ ] nocturia [ ] hematuria [ ] increased urinary frequency  Musculoskeletal: [x ] negative [ ] back pain [ ] myalgias [ ] arthralgias [ ] fracture  Skin:                       [ x] negative [ ] rash [ ] itch  Neurological:        [ x] negative [ ] headache [ ] dizziness [ ] syncope [ ] weakness [ ] numbness  Psychiatric:           [ x] negative [ ] anxiety [ ] depression  Endocrine:            [ x] negative [ ] diabetes [ ] thyroid problem  Heme/Lymph:      [ x] negative [ ] anemia [ ] bleeding problem  Allergic/Immune: [ x] negative [ ] itchy eyes [ ] nasal discharge [ ] hives [ ] angioedema    [ x] All other systems negative  [ ] Unable to assess ROS due to      Physical Exam:  T(F): 96.8 (10-02), Max: 99.3 (10-02)  HR: 64 (10-02) (64 - 141)  BP: 89/60 (10-02) (83/52 - 182/115)  RR: 20 (10-02)  SpO2: 100% (10-02)  GENERAL: No acute distress, well-developed  HEAD:  Atraumatic, Normocephalic  ENT: EOMI, PERRLA, conjunctiva and sclera clear, Neck supple, No JVD, moist mucosa  CHEST/LUNG: Clear to auscultation bilaterally; No wheeze, equal breath sounds bilaterally   BACK: No spinal tenderness  HEART: Regular rate and rhythm; No murmurs, rubs, or gallops  ABDOMEN: Soft, Nontender, Nondistended; Bowel sounds present  EXTREMITIES:  No clubbing, cyanosis, or edema  PSYCH: Nl behavior, nl affect  NEUROLOGY: AAOx3, non-focal, cranial nerves intact  SKIN: Normal color, No rashes or lesions  LINES:    Cardiovascular Diagnostic Testing:    ECG: Personally reviewed:    Echo: Personally reviewed:    Stress Testing:    Cath:    Imaging:    CXR: Personally reviewed    Labs: Personally reviewed                        9.4    16.60 )-----------( 323      ( 02 Oct 2021 17:06 )             28.5     10    134<L>  |  99  |  16  ----------------------------<  161<H>  4.3   |  22  |  1.00    Ca    7.7<L>      02 Oct 2021 17:04  Phos  3.3     10  Mg     2.4     10-02    TPro  5.8<L>  /  Alb  3.7  /  TBili  0.9  /  DBili  x   /  AST  26  /  ALT  28  /  AlkPhos  104  10    PT/INR - ( 02 Oct 2021 17:03 )   PT: 14.8 sec;   INR: 1.25 ratio         PTT - ( 02 Oct 2021 17:03 )  PTT:25.8 sec  Serum Pro-Brain Natriuretic Peptide: 6014 pg/mL (10-01 @ 22:47)    Total Cholesterol: 115  LDL: --  HDL: 32  T      Thyroid Stimulating Hormone, Serum: 0.86 uIU/mL (10-02 @ 10:00)   Patient seen and evaluated at bedside    Chief Complaint:    HPI:  CCU Accept Note    ALAINA VILLEDA 71y  Patient is a 71y old Male who presents with a chief complaint of dyspnea.     HPI & Hospital Course:   70 yo Mongolian/English speaking male, former smoker, presents w/ PMHx of severe mitral valve insufficiency (recent MV repair 21), atrial fibrillation, CHF (Lasix 20mg qd), CAD (s/p PCI 2018), HTN, HLD, DM, tonsil cancer s/p surgery + radiation in 2019 with shortness of breath. Onset two hours PTA. Was sitting during this time. Denies chest pain, palpitations, diaphoresis. No F/C/NS/N/V/D. No cough, calf tenderness or increased LE edema. MV repair hospital course was complicated by right hemothorax requiring thoracocentesis, discharged on 21.     In the ED, patient was initially hypertensive, although became hypotensive with SBP 90s s/p nitroglycerin. Levophed was started for pressor support.     70 yo M w/ PMH recent MV repair 21 at [facility] complicated with R hemothorax, Afib not on AC?, HFpEF, CAD, HTN, T2DM, Tonsil cancer s/p removal and radiation who presents with SOB found to be initially hypertensive and started on BiPAP for flash pulm edema. After being started on nitro, patient became hypotensive and needed levophed support. Admitted initially to CCU. Patient on echo showed tamponade and patient also had pulsus paradoxus. Patient was taken urgently to OR for pericardial window with drain in place. EP was consulted for uncontrolled HR. EKG this morning showed likely 2:1 Aflutter vs Atach HR in 140s with known LBBB.     Most recent EKG showed Aflutter in variable block with LBBB, was started on amiodraone gtt.     Past Medical History:   Atherosclerosis of coronary artery   Benign prostatic hypertrophy   Essential hypertension   HLD (hyperlipidemia)   HTN (hypertension)   Type 2 diabetes mellitus     Past Surgical History:     Home Medications:  magnesium: orally once a day (20 Sep 2021 06:15)  rivastigmine 1.5 mg oral capsule: 1 cap(s) orally 2 times a day (20 Sep 2021 06:15)  Thera-Tabs M oral tablet: 1 tab(s) orally once a day (20 Sep 2021 06:15)      Current Medications:   MEDICATIONS  (STANDING):  furosemide   Injectable 40 milliGRAM(s) IV Push Once  nitroglycerin  Infusion 250 MICROgram(s)/Min (75 mL/Hr) IV Continuous <Continuous>  norepinephrine Infusion 0.05 MICROgram(s)/kG/Min (6.56 mL/Hr) IV Continuous <Continuous>    MEDICATIONS  (PRN):      Allergies:     Family History:     Social History:     ====================  Vital Signs Last 24 Hrs  T(C): 36.8 (02 Oct 2021 00:24), Max: 36.8 (02 Oct 2021 00:24)  T(F): 98.2 (02 Oct 2021 00:24), Max: 98.2 (02 Oct 2021 00:24)  HR: 135 (02 Oct 2021 00:45) (133 - 139)  BP: 91/67 (02 Oct 2021 00:45) (83/52 - 182/115)  BP(mean): 72 (02 Oct 2021 00:45) (59 - 76)  RR: 28 (02 Oct 2021 00:45) (28 - 44)  SpO2: 100% (02 Oct 2021 00:45) (78% - 100%)    Adult Advanced Hemodynamics Last 24 Hrs  CVP(mm Hg): --  CVP(cm H2O): --  CO: --  CI: --  PA: --  PA(mean): --  PCWP: --  SVR: --  SVRI: --  PVR: --  PVRI: --    Physical Exam:   General: NAD  HEENT: PERRL, EOMI, normal sclera and conjunctiva, no oral lesions  Neck: Supple, no JVD  Lungs: CTA bilaterally  Heart: RRR, normal S1S2, no murmurs/rubs/gallops  Abdomen: Soft, ND/NT  Extremities: 2+ peripheral pulses, no cyanosis/clubbing/edema, full ROM  Skin: Warm, well-perfused  Neuro: A&O x3, no focal deficits      ====================  Labs & Imaging:   CBC Full  -  ( 01 Oct 2021 22:47 )  WBC Count : 18.28 K/uL  RBC Count : 3.78 M/uL  Hemoglobin : 11.0 g/dL  Hematocrit : 33.9 %  Platelet Count - Automated : 535 K/uL  Mean Cell Volume : 89.7 fl  Mean Cell Hemoglobin : 29.1 pg  Mean Cell Hemoglobin Concentration : 32.4 gm/dL  Auto Neutrophil # : x  Auto Lymphocyte # : x  Auto Monocyte # : x  Auto Eosinophil # : x  Auto Basophil # : x  Auto Neutrophil % : x  Auto Lymphocyte % : x  Auto Monocyte % : x  Auto Eosinophil % : x  Auto Basophil % : x    10-    128<L>  |  94<L>  |  16  ----------------------------<  252<H>  4.0   |  16<L>  |  1.03    Ca    8.7      01 Oct 2021 22:47    TPro  6.8  /  Alb  4.0  /  TBili  1.0  /  DBili  x   /  AST  33  /  ALT  34  /  AlkPhos  125<H>  10-01    PT/INR - ( 01 Oct 2021 22:47 )   PT: 14.2 sec;   INR: 1.19 ratio         PTT - ( 01 Oct 2021 22:47 )  PTT:26.6 sec                ====================  Assessment & Plan:     70 yo Mongolian/English speaking male, former smoker, presents w/ PMHx of severe mitral valve insufficiency (recent MV repair 21), atrial fibrillation, CHF (Lasix 20mg qd), CAD (s/p PCI ), HTN, HLD, DM, tonsil cancer s/p surgery + radiation in 2019 with shortness of breath.    Neurology:  AOx3  Denies pain    Respiratory:  Tachypneic  On BiPAP 12/5, wean as tolerated   Titrate FiO2 to maintain SaO2 >95%  Serial ABGs  Recent right hemothorax requiring thoracocentesis     Cardiology:  BNP 6014, troponin 51, lactate 4.1  On levophed, maintain MAP > 60  Initially hypertensive, although became hypotensive with SBP 90s s/p nitroglycerin.  TTE (21): LVEF 50-55%, LV apex hypokinetic and aneurysmal, severe MR, minimal TR. Mild pericardial effusion (not drained)    CXR with pulmonary edema with BL pleural effusion   Continue home amiodarone, aspirin, atorvastatin   Hold home metoprolol until patient is more hemodynamically stable   PRN diuresis; goal net negative   Obtain echocardiogram in the morning     GI:  ADAT s/p wean off of BiPAP  Continue home Protonix     Renal:  Metabolic acidosis 7.22//16  Hyponatremic in the setting of hyperglycemia  Continue to monitor   Replete electrolytes as needed     Endocrine:  History of T2DM  Hold home metformin   Monitor BG, ISS    Heme:  Hgb/Hct 11.0/33.9  Continue to monitor with daily labs  SQH for DVT ppx     ID:  Afebrile, tachycardic, hypotensive, leukocytosis 18.28  Zosyn (10/2-)   (02 Oct 2021 01:11)      PMHx:   Benign prostatic hypertrophy    HTN (hypertension)    HLD (hyperlipidemia)    DM (diabetes mellitus)    Atherosclerosis of coronary artery    Type 2 diabetes mellitus    Hyperlipidemia    Essential hypertension        PSHx:   No significant past surgical history        Allergies:  No Known Allergies      Home Meds:    Current Medications:   acetaminophen   Tablet .. 650 milliGRAM(s) Oral every 6 hours PRN  aMIOdarone Infusion 0.5 mG/Min IV Continuous <Continuous>  cefuroxime  IVPB 1500 milliGRAM(s) IV Intermittent every 8 hours  chlorhexidine 0.12% Liquid 5 milliLiter(s) Oral Mucosa two times a day  chlorhexidine 0.12% Liquid 15 milliLiter(s) Oral Mucosa every 12 hours  dexMEDEtomidine Infusion 0.5 MICROgram(s)/kG/Hr IV Continuous <Continuous>  dextrose 50% Injectable 50 milliLiter(s) IV Push every 15 minutes  dextrose 50% Injectable 25 milliLiter(s) IV Push every 15 minutes  influenza   Vaccine 0.5 milliLiter(s) IntraMuscular once  insulin regular Infusion 3 Unit(s)/Hr IV Continuous <Continuous>  meperidine     Injectable 25 milliGRAM(s) IV Push once  niCARdipine Infusion 5 mG/Hr IV Continuous <Continuous>  norepinephrine Infusion 0.03 MICROgram(s)/kG/Min IV Continuous <Continuous>  oxyCODONE    IR 5 milliGRAM(s) Oral every 6 hours PRN  pantoprazole  Injectable 40 milliGRAM(s) IV Push daily  polyethylene glycol 3350 17 Gram(s) Oral daily  potassium chloride  10 mEq/50 mL IVPB 10 milliEquivalent(s) IV Intermittent every 1 hour  potassium chloride  10 mEq/50 mL IVPB 10 milliEquivalent(s) IV Intermittent every 1 hour  potassium chloride  10 mEq/50 mL IVPB 10 milliEquivalent(s) IV Intermittent every 1 hour  sodium chloride 0.9%. 1000 milliLiter(s) IV Continuous <Continuous>      FAMILY HISTORY:  Family history of stroke (Sibling)    Family history of cerebrovascular accident (CVA) (Sibling, Mother)    Family history of acute myocardial infarction (Sibling)        Social History:  Smoking History:  Alcohol Use:  Drug Use:    REVIEW OF SYSTEMS:  Constitutional:     [x ] negative [ ] fevers [ ] chills [ ] weight loss [ ] weight gain  HEENT:                  [x ] negative [ ] dry eyes [ ] eye irritation [ ] postnasal drip [ ] nasal congestion  CV:                         [ x] negative  [ ] chest pain [ ] orthopnea [ ] palpitations [ ] murmur  Resp:                     [x ] negative [ ] cough [ ] shortness of breath [ ] dyspnea [ ] wheezing [ ] sputum [ ]hemoptysis  GI:                          [ x] negative [ ] nausea [ ] vomiting [ ] diarrhea [ ] constipation [ ] abd pain [ ] dysphagia   :                        [ x] negative [ ] dysuria [ ] nocturia [ ] hematuria [ ] increased urinary frequency  Musculoskeletal: [x ] negative [ ] back pain [ ] myalgias [ ] arthralgias [ ] fracture  Skin:                       [ x] negative [ ] rash [ ] itch  Neurological:        [ x] negative [ ] headache [ ] dizziness [ ] syncope [ ] weakness [ ] numbness  Psychiatric:           [ x] negative [ ] anxiety [ ] depression  Endocrine:            [ x] negative [ ] diabetes [ ] thyroid problem  Heme/Lymph:      [ x] negative [ ] anemia [ ] bleeding problem  Allergic/Immune: [ x] negative [ ] itchy eyes [ ] nasal discharge [ ] hives [ ] angioedema    [ x] All other systems negative  [ ] Unable to assess ROS due to      Physical Exam:  T(F): 96.8 (10-02), Max: 99.3 (10-02)  HR: 64 (10-02) (64 - 141)  BP: 89/60 (10-02) (83/52 - 182/115)  RR: 20 (10-02)  SpO2: 100% (10-02)  GENERAL: No acute distress, well-developed  HEAD:  Atraumatic, Normocephalic  ENT: EOMI, PERRLA, conjunctiva and sclera clear, Neck supple, No JVD, moist mucosa  CHEST/LUNG: Clear to auscultation bilaterally; No wheeze, equal breath sounds bilaterally   BACK: No spinal tenderness  HEART: Regular rate and rhythm; No murmurs, rubs, or gallops  ABDOMEN: Soft, Nontender, Nondistended; Bowel sounds present  EXTREMITIES:  No clubbing, cyanosis, or edema  PSYCH: Nl behavior, nl affect  NEUROLOGY: AAOx3, non-focal, cranial nerves intact  SKIN: Normal color, No rashes or lesions  LINES:    Cardiovascular Diagnostic Testing:    ECG: Personally reviewed:    Echo: Personally reviewed:    Stress Testing:    Cath:    Imaging:    CXR: Personally reviewed    Labs: Personally reviewed                        9.4    16.60 )-----------( 323      ( 02 Oct 2021 17:06 )             28.5     10-02    134<L>  |  99  |  16  ----------------------------<  161<H>  4.3   |  22  |  1.00    Ca    7.7<L>      02 Oct 2021 17:04  Phos  3.3     10  Mg     2.4     10-02    TPro  5.8<L>  /  Alb  3.7  /  TBili  0.9  /  DBili  x   /  AST  26  /  ALT  28  /  AlkPhos  104  10    PT/INR - ( 02 Oct 2021 17:03 )   PT: 14.8 sec;   INR: 1.25 ratio         PTT - ( 02 Oct 2021 17:03 )  PTT:25.8 sec  Serum Pro-Brain Natriuretic Peptide: 6014 pg/mL (10-01 @ 22:47)    Total Cholesterol: 115  LDL: --  HDL: 32  T      Thyroid Stimulating Hormone, Serum: 0.86 uIU/mL (10-02 @ 10:00)   Patient seen and evaluated at bedside    Chief Complaint:    HPI:  CCU Accept Note    ALAINA VILLEDA 71y  Patient is a 71y old Male who presents with a chief complaint of dyspnea.     HPI & Hospital Course:   70 yo Polish/English speaking male, former smoker, presents w/ PMHx of severe mitral valve insufficiency (recent MV repair 21), atrial fibrillation, CHF (Lasix 20mg qd), CAD (s/p PCI 2018), HTN, HLD, DM, tonsil cancer s/p surgery + radiation in 2019 with shortness of breath. Onset two hours PTA. Was sitting during this time. Denies chest pain, palpitations, diaphoresis. No F/C/NS/N/V/D. No cough, calf tenderness or increased LE edema. MV repair hospital course was complicated by right hemothorax requiring thoracocentesis, discharged on 21.     In the ED, patient was initially hypertensive, although became hypotensive with SBP 90s s/p nitroglycerin. Levophed was started for pressor support.     70 yo M w/ PMH recent MV repair 21 at [facility] complicated with R hemothorax, Afib not on AC?, HFpEF, CAD, HTN, T2DM, Tonsil cancer s/p removal and radiation who presents with SOB found to be initially hypertensive and started on BiPAP for flash pulm edema. After being started on nitro, patient became hypotensive and needed levophed support. Admitted initially to CCU. Patient on echo showed tamponade and patient also had pulsus paradoxus. Patient was taken urgently to OR for pericardial window with drain in place. EP was consulted for uncontrolled HR. EKG this morning showed likely 2:1 Aflutter vs Atach HR in 140s with known LBBB.     Most recent EKG showed Aflutter in variable block with LBBB, was started on amiodraone gtt.     Past Medical History:   Atherosclerosis of coronary artery   Benign prostatic hypertrophy   Essential hypertension   HLD (hyperlipidemia)   HTN (hypertension)   Type 2 diabetes mellitus     Past Surgical History:     Home Medications:  magnesium: orally once a day (20 Sep 2021 06:15)  rivastigmine 1.5 mg oral capsule: 1 cap(s) orally 2 times a day (20 Sep 2021 06:15)  Thera-Tabs M oral tablet: 1 tab(s) orally once a day (20 Sep 2021 06:15)      Current Medications:   MEDICATIONS  (STANDING):  furosemide   Injectable 40 milliGRAM(s) IV Push Once  nitroglycerin  Infusion 250 MICROgram(s)/Min (75 mL/Hr) IV Continuous <Continuous>  norepinephrine Infusion 0.05 MICROgram(s)/kG/Min (6.56 mL/Hr) IV Continuous <Continuous>        ====================  Labs & Imaging:   CBC Full  -  ( 01 Oct 2021 22:47 )  WBC Count : 18.28 K/uL  RBC Count : 3.78 M/uL  Hemoglobin : 11.0 g/dL  Hematocrit : 33.9 %  Platelet Count - Automated : 535 K/uL  Mean Cell Volume : 89.7 fl  Mean Cell Hemoglobin : 29.1 pg  Mean Cell Hemoglobin Concentration : 32.4 gm/dL  Auto Neutrophil # : x  Auto Lymphocyte # : x  Auto Monocyte # : x  Auto Eosinophil # : x  Auto Basophil # : x  Auto Neutrophil % : x  Auto Lymphocyte % : x  Auto Monocyte % : x  Auto Eosinophil % : x  Auto Basophil % : x    10-01    128<L>  |  94<L>  |  16  ----------------------------<  252<H>  4.0   |  16<L>  |  1.03    Ca    8.7      01 Oct 2021 22:47    TPro  6.8  /  Alb  4.0  /  TBili  1.0  /  DBili  x   /  AST  33  /  ALT  34  /  AlkPhos  125<H>  10-01    PT/INR - ( 01 Oct 2021 22:47 )   PT: 14.2 sec;   INR: 1.19 ratio         PTT - ( 01 Oct 2021 22:47 )  PTT:26.6 sec                    PMHx:   Benign prostatic hypertrophy    HTN (hypertension)    HLD (hyperlipidemia)    DM (diabetes mellitus)    Atherosclerosis of coronary artery    Type 2 diabetes mellitus    Hyperlipidemia    Essential hypertension        PSHx:   No significant past surgical history        Allergies:  No Known Allergies      Home Meds:    Current Medications:   acetaminophen   Tablet .. 650 milliGRAM(s) Oral every 6 hours PRN  aMIOdarone Infusion 0.5 mG/Min IV Continuous <Continuous>  cefuroxime  IVPB 1500 milliGRAM(s) IV Intermittent every 8 hours  chlorhexidine 0.12% Liquid 5 milliLiter(s) Oral Mucosa two times a day  chlorhexidine 0.12% Liquid 15 milliLiter(s) Oral Mucosa every 12 hours  dexMEDEtomidine Infusion 0.5 MICROgram(s)/kG/Hr IV Continuous <Continuous>  dextrose 50% Injectable 50 milliLiter(s) IV Push every 15 minutes  dextrose 50% Injectable 25 milliLiter(s) IV Push every 15 minutes  influenza   Vaccine 0.5 milliLiter(s) IntraMuscular once  insulin regular Infusion 3 Unit(s)/Hr IV Continuous <Continuous>  meperidine     Injectable 25 milliGRAM(s) IV Push once  niCARdipine Infusion 5 mG/Hr IV Continuous <Continuous>  norepinephrine Infusion 0.03 MICROgram(s)/kG/Min IV Continuous <Continuous>  oxyCODONE    IR 5 milliGRAM(s) Oral every 6 hours PRN  pantoprazole  Injectable 40 milliGRAM(s) IV Push daily  polyethylene glycol 3350 17 Gram(s) Oral daily  potassium chloride  10 mEq/50 mL IVPB 10 milliEquivalent(s) IV Intermittent every 1 hour  potassium chloride  10 mEq/50 mL IVPB 10 milliEquivalent(s) IV Intermittent every 1 hour  potassium chloride  10 mEq/50 mL IVPB 10 milliEquivalent(s) IV Intermittent every 1 hour  sodium chloride 0.9%. 1000 milliLiter(s) IV Continuous <Continuous>      FAMILY HISTORY:  Family history of stroke (Sibling)    Family history of cerebrovascular accident (CVA) (Sibling, Mother)    Family history of acute myocardial infarction (Sibling)        Social History:  Smoking History:  Alcohol Use:  Drug Use:    REVIEW OF SYSTEMS:  Constitutional:     [x ] negative [ ] fevers [ ] chills [ ] weight loss [ ] weight gain  HEENT:                  [x ] negative [ ] dry eyes [ ] eye irritation [ ] postnasal drip [ ] nasal congestion  CV:                         [ x] negative  [ ] chest pain [ ] orthopnea [ ] palpitations [ ] murmur  Resp:                     [x ] negative [ ] cough [ ] shortness of breath [ ] dyspnea [ ] wheezing [ ] sputum [ ]hemoptysis  GI:                          [ x] negative [ ] nausea [ ] vomiting [ ] diarrhea [ ] constipation [ ] abd pain [ ] dysphagia   :                        [ x] negative [ ] dysuria [ ] nocturia [ ] hematuria [ ] increased urinary frequency  Musculoskeletal: [x ] negative [ ] back pain [ ] myalgias [ ] arthralgias [ ] fracture  Skin:                       [ x] negative [ ] rash [ ] itch  Neurological:        [ x] negative [ ] headache [ ] dizziness [ ] syncope [ ] weakness [ ] numbness  Psychiatric:           [ x] negative [ ] anxiety [ ] depression  Endocrine:            [ x] negative [ ] diabetes [ ] thyroid problem  Heme/Lymph:      [ x] negative [ ] anemia [ ] bleeding problem  Allergic/Immune: [ x] negative [ ] itchy eyes [ ] nasal discharge [ ] hives [ ] angioedema    [ x] All other systems negative  [ ] Unable to assess ROS due to      Physical Exam:  T(F): 96.8 (10-02), Max: 99.3 (10-02)  HR: 64 (10-02) (64 - 141)  BP: 89/60 (10-02) (83/52 - 182/115)  RR: 20 (10-02)  SpO2: 100% (10-02)  GENERAL: intubated, sedated   HEAD:  Atraumatic, Normocephalic  ENT: RIJ in place   CHEST/LUNG: Clear to auscultation bilaterally; No wheeze, equal breath sounds bilaterally, pericaridal drain in place   HEART: Tachycardic rate and rhythm; No murmurs, rubs, or gallops  ABDOMEN: Soft, Nontender, Nondistended  EXTREMITIES:  +1 pitting edema in LE     NEUROLOGY: Sedated       Cardiovascular Diagnostic Testing:    ECG: Personally reviewed:    Echo: Personally reviewed:    Stress Testing:    Cath:    Imaging:    CXR: Personally reviewed    Labs: Personally reviewed                        9.4    16.60 )-----------( 323      ( 02 Oct 2021 17:06 )             28.5     10-02    134<L>  |  99  |  16  ----------------------------<  161<H>  4.3   |  22  |  1.00    Ca    7.7<L>      02 Oct 2021 17:04  Phos  3.3     10-02  Mg     2.4     10-02    TPro  5.8<L>  /  Alb  3.7  /  TBili  0.9  /  DBili  x   /  AST  26  /  ALT  28  /  AlkPhos  104  10-02    PT/INR - ( 02 Oct 2021 17:03 )   PT: 14.8 sec;   INR: 1.25 ratio         PTT - ( 02 Oct 2021 17:03 )  PTT:25.8 sec  Serum Pro-Brain Natriuretic Peptide: 6014 pg/mL (10-01 @ 22:47)    Total Cholesterol: 115  LDL: --  HDL: 32  T      Thyroid Stimulating Hormone, Serum: 0.86 uIU/mL (10-02 @ 10:00)

## 2021-10-02 NOTE — PRE-ANESTHESIA EVALUATION ADULT - NSANTHPMHFT_GEN_ALL_CORE
70 yo Yoruba/English speaking male, former smoker, presents w/ PMHx of severe mitral valve insufficiency (recent MV repair 9/20/21), atrial fibrillation, CHF (Lasix 20mg qd), CAD (s/p PCI 2018), HTN, HLD, DM, tonsil cancer s/p surgery + radiation in 2019 with shortness of breath. Denies current esophageal pathology. Currently on amio for concern for wide complex tachycardia, CPAP for hypoxia 2/2 pleural effusions, right hemothorax, and pulmonary edema.

## 2021-10-02 NOTE — H&P ADULT - HISTORY OF PRESENT ILLNESS
CCU Accept Note    ALAINA VILLEDA 71y  Patient is a 71y old Male who presents with a chief complaint of dyspnea.     HPI & Hospital Course:   72 yo Hebrew/English speaking male, former smoker, presents w/ PMHx of severe mitral valve insufficiency (recent MV repair 9/20/21), atrial fibrillation, CHF (Lasix 20mg qd), CAD (s/p PCI 2018), HTN, HLD, DM, tonsil cancer s/p surgery + radiation in 2019 with shortness of breath. Onset two hours PTA. Was sitting during this time. Denies chest pain, palpitations, diaphoresis. No F/C/NS/N/V/D. No cough, calf tenderness or increased LE edema. MV repair hospital course was complicated by right hemothorax requiring thoracocentesis, discharged on 9/25/21.     In the ED, patient was initially hypertensive, although became hypotensive with SBP 90s s/p nitroglycerin. Levophed was started for pressor support.     Past Medical History:   Atherosclerosis of coronary artery   Benign prostatic hypertrophy   Essential hypertension   HLD (hyperlipidemia)   HTN (hypertension)   Type 2 diabetes mellitus     Past Surgical History:     Home Medications:  magnesium: orally once a day (20 Sep 2021 06:15)  rivastigmine 1.5 mg oral capsule: 1 cap(s) orally 2 times a day (20 Sep 2021 06:15)  Thera-Tabs M oral tablet: 1 tab(s) orally once a day (20 Sep 2021 06:15)      Current Medications:   MEDICATIONS  (STANDING):  furosemide   Injectable 40 milliGRAM(s) IV Push Once  nitroglycerin  Infusion 250 MICROgram(s)/Min (75 mL/Hr) IV Continuous <Continuous>  norepinephrine Infusion 0.05 MICROgram(s)/kG/Min (6.56 mL/Hr) IV Continuous <Continuous>    MEDICATIONS  (PRN):      Allergies:     Family History:     Social History:     ====================  Vital Signs Last 24 Hrs  T(C): 36.8 (02 Oct 2021 00:24), Max: 36.8 (02 Oct 2021 00:24)  T(F): 98.2 (02 Oct 2021 00:24), Max: 98.2 (02 Oct 2021 00:24)  HR: 135 (02 Oct 2021 00:45) (133 - 139)  BP: 91/67 (02 Oct 2021 00:45) (83/52 - 182/115)  BP(mean): 72 (02 Oct 2021 00:45) (59 - 76)  RR: 28 (02 Oct 2021 00:45) (28 - 44)  SpO2: 100% (02 Oct 2021 00:45) (78% - 100%)    Adult Advanced Hemodynamics Last 24 Hrs  CVP(mm Hg): --  CVP(cm H2O): --  CO: --  CI: --  PA: --  PA(mean): --  PCWP: --  SVR: --  SVRI: --  PVR: --  PVRI: --    Physical Exam:   General: NAD  HEENT: PERRL, EOMI, normal sclera and conjunctiva, no oral lesions  Neck: Supple, no JVD  Lungs: CTA bilaterally  Heart: RRR, normal S1S2, no murmurs/rubs/gallops  Abdomen: Soft, ND/NT  Extremities: 2+ peripheral pulses, no cyanosis/clubbing/edema, full ROM  Skin: Warm, well-perfused  Neuro: A&O x3, no focal deficits      ====================  Labs & Imaging:   CBC Full  -  ( 01 Oct 2021 22:47 )  WBC Count : 18.28 K/uL  RBC Count : 3.78 M/uL  Hemoglobin : 11.0 g/dL  Hematocrit : 33.9 %  Platelet Count - Automated : 535 K/uL  Mean Cell Volume : 89.7 fl  Mean Cell Hemoglobin : 29.1 pg  Mean Cell Hemoglobin Concentration : 32.4 gm/dL  Auto Neutrophil # : x  Auto Lymphocyte # : x  Auto Monocyte # : x  Auto Eosinophil # : x  Auto Basophil # : x  Auto Neutrophil % : x  Auto Lymphocyte % : x  Auto Monocyte % : x  Auto Eosinophil % : x  Auto Basophil % : x    10-01    128<L>  |  94<L>  |  16  ----------------------------<  252<H>  4.0   |  16<L>  |  1.03    Ca    8.7      01 Oct 2021 22:47    TPro  6.8  /  Alb  4.0  /  TBili  1.0  /  DBili  x   /  AST  33  /  ALT  34  /  AlkPhos  125<H>  10-01    PT/INR - ( 01 Oct 2021 22:47 )   PT: 14.2 sec;   INR: 1.19 ratio         PTT - ( 01 Oct 2021 22:47 )  PTT:26.6 sec                ====================  Assessment & Plan:     72 yo Hebrew/English speaking male, former smoker, presents w/ PMHx of severe mitral valve insufficiency (recent MV repair 9/20/21), atrial fibrillation, CHF (Lasix 20mg qd), CAD (s/p PCI 2018), HTN, HLD, DM, tonsil cancer s/p surgery + radiation in 2019 with shortness of breath.    Neurology:  AOx3  Denies pain    Respiratory:  Tachypneic, hypoxic   On BiPAP 12/5, wean as tolerated   Titrate FiO2 to maintain SaO2 >95%  Serial ABGs    Cardiology:  BNP 6014, troponin 51, lactate 4.1  On levophed   Maintain MAP > 60  Initially hypertensive, although became hypotensive with SBP 90s s/p nitroglycerin.  TTE (7/22/21): LVEF 50-55%, LV apex hypokinetic and aneurysmal, severe MR, minimal TR. Mild pericardial effusion (not drained)    CXR with pulmonary edema with BL pleural effusion   Continue home amiodarone, aspirin, atorvastatin   Hold home metoprolol until patient is more hemodynamically stable   PRN diuresis; goal net negative   Obtain echocardiogram in the morning     GI:  ADAT s/p wean off of BiPAP  Continue home protonix     Heme:  Hgb/Hct 11.0/33.9  Continue to monitor with daily labs  SQH for DVT ppx     Renal:  Metabolic acidosis 7.22/21/16  Hyponatremic in the setting of hyperglycemia  Continue to monitor   Replete electrolytes as needed     Endocrine:  History of T2DM  Hold home metformin   Monitor BG, ISS    ID:  Afebrile, leukocytosis 18.28  Zosyn (10/2-)   CCU Accept Note    ALAINA VILLEDA 71y  Patient is a 71y old Male who presents with a chief complaint of dyspnea.     HPI & Hospital Course:   70 yo Italian/English speaking male, former smoker, presents w/ PMHx of severe mitral valve insufficiency (recent MV repair 9/20/21), atrial fibrillation, CHF (Lasix 20mg qd), CAD (s/p PCI 2018), HTN, HLD, DM, tonsil cancer s/p surgery + radiation in 2019 with shortness of breath. Onset two hours PTA. Was sitting during this time. Denies chest pain, palpitations, diaphoresis. No F/C/NS/N/V/D. No cough, calf tenderness or increased LE edema. MV repair hospital course was complicated by right hemothorax requiring thoracocentesis, discharged on 9/25/21.     In the ED, patient was initially hypertensive, although became hypotensive with SBP 90s s/p nitroglycerin. Levophed was started for pressor support.     Past Medical History:   Atherosclerosis of coronary artery   Benign prostatic hypertrophy   Essential hypertension   HLD (hyperlipidemia)   HTN (hypertension)   Type 2 diabetes mellitus     Past Surgical History:     Home Medications:  magnesium: orally once a day (20 Sep 2021 06:15)  rivastigmine 1.5 mg oral capsule: 1 cap(s) orally 2 times a day (20 Sep 2021 06:15)  Thera-Tabs M oral tablet: 1 tab(s) orally once a day (20 Sep 2021 06:15)      Current Medications:   MEDICATIONS  (STANDING):  furosemide   Injectable 40 milliGRAM(s) IV Push Once  nitroglycerin  Infusion 250 MICROgram(s)/Min (75 mL/Hr) IV Continuous <Continuous>  norepinephrine Infusion 0.05 MICROgram(s)/kG/Min (6.56 mL/Hr) IV Continuous <Continuous>    MEDICATIONS  (PRN):      Allergies:     Family History:     Social History:     ====================  Vital Signs Last 24 Hrs  T(C): 36.8 (02 Oct 2021 00:24), Max: 36.8 (02 Oct 2021 00:24)  T(F): 98.2 (02 Oct 2021 00:24), Max: 98.2 (02 Oct 2021 00:24)  HR: 135 (02 Oct 2021 00:45) (133 - 139)  BP: 91/67 (02 Oct 2021 00:45) (83/52 - 182/115)  BP(mean): 72 (02 Oct 2021 00:45) (59 - 76)  RR: 28 (02 Oct 2021 00:45) (28 - 44)  SpO2: 100% (02 Oct 2021 00:45) (78% - 100%)    Adult Advanced Hemodynamics Last 24 Hrs  CVP(mm Hg): --  CVP(cm H2O): --  CO: --  CI: --  PA: --  PA(mean): --  PCWP: --  SVR: --  SVRI: --  PVR: --  PVRI: --    Physical Exam:   General: NAD  HEENT: PERRL, EOMI, normal sclera and conjunctiva, no oral lesions  Neck: Supple, no JVD  Lungs: CTA bilaterally  Heart: RRR, normal S1S2, no murmurs/rubs/gallops  Abdomen: Soft, ND/NT  Extremities: 2+ peripheral pulses, no cyanosis/clubbing/edema, full ROM  Skin: Warm, well-perfused  Neuro: A&O x3, no focal deficits      ====================  Labs & Imaging:   CBC Full  -  ( 01 Oct 2021 22:47 )  WBC Count : 18.28 K/uL  RBC Count : 3.78 M/uL  Hemoglobin : 11.0 g/dL  Hematocrit : 33.9 %  Platelet Count - Automated : 535 K/uL  Mean Cell Volume : 89.7 fl  Mean Cell Hemoglobin : 29.1 pg  Mean Cell Hemoglobin Concentration : 32.4 gm/dL  Auto Neutrophil # : x  Auto Lymphocyte # : x  Auto Monocyte # : x  Auto Eosinophil # : x  Auto Basophil # : x  Auto Neutrophil % : x  Auto Lymphocyte % : x  Auto Monocyte % : x  Auto Eosinophil % : x  Auto Basophil % : x    10-01    128<L>  |  94<L>  |  16  ----------------------------<  252<H>  4.0   |  16<L>  |  1.03    Ca    8.7      01 Oct 2021 22:47    TPro  6.8  /  Alb  4.0  /  TBili  1.0  /  DBili  x   /  AST  33  /  ALT  34  /  AlkPhos  125<H>  10-01    PT/INR - ( 01 Oct 2021 22:47 )   PT: 14.2 sec;   INR: 1.19 ratio         PTT - ( 01 Oct 2021 22:47 )  PTT:26.6 sec                ====================  Assessment & Plan:     70 yo Italian/English speaking male, former smoker, presents w/ PMHx of severe mitral valve insufficiency (recent MV repair 9/20/21), atrial fibrillation, CHF (Lasix 20mg qd), CAD (s/p PCI 2018), HTN, HLD, DM, tonsil cancer s/p surgery + radiation in 2019 with shortness of breath.    Neurology:  AOx3  Denies pain    Respiratory:  Tachypneic, hypoxic   On BiPAP 12/5, wean as tolerated   Titrate FiO2 to maintain SaO2 >95%  Serial ABGs  Recent right hemothorax requiring thoracocentesis 9/25    Cardiology:  BNP 6014, troponin 51, lactate 4.1  On levophed, maintain MAP > 60  Initially hypertensive, although became hypotensive with SBP 90s s/p nitroglycerin.  TTE (7/22/21): LVEF 50-55%, LV apex hypokinetic and aneurysmal, severe MR, minimal TR. Mild pericardial effusion (not drained)    CXR with pulmonary edema with BL pleural effusion   Continue home amiodarone, aspirin, atorvastatin   Hold home metoprolol until patient is more hemodynamically stable   PRN diuresis; goal net negative   Obtain echocardiogram in the morning     GI:  ADAT s/p wean off of BiPAP  Continue home Protonix     Renal:  Metabolic acidosis 7.22/21/16  Hyponatremic in the setting of hyperglycemia  Continue to monitor   Replete electrolytes as needed     Endocrine:  History of T2DM  Hold home metformin   Monitor BG, ISS    Heme:  Hgb/Hct 11.0/33.9  Continue to monitor with daily labs  SQH for DVT ppx     ID:  Afebrile, tachycardic, hypotensive, leukocytosis 18.28  Zosyn (10/2-)   CCU Accept Note    ALAINA VILLEDA 71y  Patient is a 71y old Male who presents with a chief complaint of dyspnea.     HPI & Hospital Course:   70 yo Occitan/English speaking male, former smoker, presents w/ PMHx of severe mitral valve insufficiency (recent MV repair 9/20/21), atrial fibrillation, CHF (Lasix 20mg qd), CAD (s/p PCI 2018), HTN, HLD, DM, tonsil cancer s/p surgery + radiation in 2019 with shortness of breath. Onset two hours PTA. Was sitting during this time. Denies chest pain, palpitations, diaphoresis. No F/C/NS/N/V/D. No cough, calf tenderness or increased LE edema. MV repair hospital course was complicated by right hemothorax requiring thoracocentesis, discharged on 9/25/21.     In the ED, patient was initially hypertensive, although became hypotensive with SBP 90s s/p nitroglycerin. Levophed was started for pressor support.     Past Medical History:   Atherosclerosis of coronary artery   Benign prostatic hypertrophy   Essential hypertension   HLD (hyperlipidemia)   HTN (hypertension)   Type 2 diabetes mellitus     Past Surgical History:     Home Medications:  magnesium: orally once a day (20 Sep 2021 06:15)  rivastigmine 1.5 mg oral capsule: 1 cap(s) orally 2 times a day (20 Sep 2021 06:15)  Thera-Tabs M oral tablet: 1 tab(s) orally once a day (20 Sep 2021 06:15)      Current Medications:   MEDICATIONS  (STANDING):  furosemide   Injectable 40 milliGRAM(s) IV Push Once  nitroglycerin  Infusion 250 MICROgram(s)/Min (75 mL/Hr) IV Continuous <Continuous>  norepinephrine Infusion 0.05 MICROgram(s)/kG/Min (6.56 mL/Hr) IV Continuous <Continuous>    MEDICATIONS  (PRN):      Allergies:     Family History:     Social History:     ====================  Vital Signs Last 24 Hrs  T(C): 36.8 (02 Oct 2021 00:24), Max: 36.8 (02 Oct 2021 00:24)  T(F): 98.2 (02 Oct 2021 00:24), Max: 98.2 (02 Oct 2021 00:24)  HR: 135 (02 Oct 2021 00:45) (133 - 139)  BP: 91/67 (02 Oct 2021 00:45) (83/52 - 182/115)  BP(mean): 72 (02 Oct 2021 00:45) (59 - 76)  RR: 28 (02 Oct 2021 00:45) (28 - 44)  SpO2: 100% (02 Oct 2021 00:45) (78% - 100%)    Adult Advanced Hemodynamics Last 24 Hrs  CVP(mm Hg): --  CVP(cm H2O): --  CO: --  CI: --  PA: --  PA(mean): --  PCWP: --  SVR: --  SVRI: --  PVR: --  PVRI: --    Physical Exam:   General: NAD  HEENT: PERRL, EOMI, normal sclera and conjunctiva, no oral lesions  Neck: Supple, no JVD  Lungs: CTA bilaterally  Heart: RRR, normal S1S2, no murmurs/rubs/gallops  Abdomen: Soft, ND/NT  Extremities: 2+ peripheral pulses, no cyanosis/clubbing/edema, full ROM  Skin: Warm, well-perfused  Neuro: A&O x3, no focal deficits      ====================  Labs & Imaging:   CBC Full  -  ( 01 Oct 2021 22:47 )  WBC Count : 18.28 K/uL  RBC Count : 3.78 M/uL  Hemoglobin : 11.0 g/dL  Hematocrit : 33.9 %  Platelet Count - Automated : 535 K/uL  Mean Cell Volume : 89.7 fl  Mean Cell Hemoglobin : 29.1 pg  Mean Cell Hemoglobin Concentration : 32.4 gm/dL  Auto Neutrophil # : x  Auto Lymphocyte # : x  Auto Monocyte # : x  Auto Eosinophil # : x  Auto Basophil # : x  Auto Neutrophil % : x  Auto Lymphocyte % : x  Auto Monocyte % : x  Auto Eosinophil % : x  Auto Basophil % : x    10-01    128<L>  |  94<L>  |  16  ----------------------------<  252<H>  4.0   |  16<L>  |  1.03    Ca    8.7      01 Oct 2021 22:47    TPro  6.8  /  Alb  4.0  /  TBili  1.0  /  DBili  x   /  AST  33  /  ALT  34  /  AlkPhos  125<H>  10-01    PT/INR - ( 01 Oct 2021 22:47 )   PT: 14.2 sec;   INR: 1.19 ratio         PTT - ( 01 Oct 2021 22:47 )  PTT:26.6 sec                ====================  Assessment & Plan:     70 yo Occitan/English speaking male, former smoker, presents w/ PMHx of severe mitral valve insufficiency (recent MV repair 9/20/21), atrial fibrillation, CHF (Lasix 20mg qd), CAD (s/p PCI 2018), HTN, HLD, DM, tonsil cancer s/p surgery + radiation in 2019 with shortness of breath.    Neurology:  AOx3  Denies pain    Respiratory:  Tachypneic  On BiPAP 12/5, wean as tolerated   Titrate FiO2 to maintain SaO2 >95%  Serial ABGs  Recent right hemothorax requiring thoracocentesis 9/25    Cardiology:  BNP 6014, troponin 51, lactate 4.1  On levophed, maintain MAP > 60  Initially hypertensive, although became hypotensive with SBP 90s s/p nitroglycerin.  TTE (7/22/21): LVEF 50-55%, LV apex hypokinetic and aneurysmal, severe MR, minimal TR. Mild pericardial effusion (not drained)    CXR with pulmonary edema with BL pleural effusion   Continue home amiodarone, aspirin, atorvastatin   Hold home metoprolol until patient is more hemodynamically stable   PRN diuresis; goal net negative   Obtain echocardiogram in the morning     GI:  ADAT s/p wean off of BiPAP  Continue home Protonix     Renal:  Metabolic acidosis 7.22/21/16  Hyponatremic in the setting of hyperglycemia  Continue to monitor   Replete electrolytes as needed     Endocrine:  History of T2DM  Hold home metformin   Monitor BG, ISS    Heme:  Hgb/Hct 11.0/33.9  Continue to monitor with daily labs  SQH for DVT ppx     ID:  Afebrile, tachycardic, hypotensive, leukocytosis 18.28  Zosyn (10/2-)

## 2021-10-03 ENCOUNTER — INPATIENT (INPATIENT)
Facility: HOSPITAL | Age: 71
LOS: 7 days | Discharge: HOME HEALTH | DRG: 163 | End: 2021-10-11
Attending: THORACIC SURGERY (CARDIOTHORACIC VASCULAR SURGERY) | Admitting: THORACIC SURGERY (CARDIOTHORACIC VASCULAR SURGERY)
Payer: MEDICARE

## 2021-10-03 ENCOUNTER — TRANSCRIPTION ENCOUNTER (OUTPATIENT)
Age: 71
End: 2021-10-03

## 2021-10-03 VITALS — HEART RATE: 68 BPM | RESPIRATION RATE: 17 BRPM | OXYGEN SATURATION: 100 %

## 2021-10-03 VITALS — WEIGHT: 197.53 LBS

## 2021-10-03 DIAGNOSIS — J98.19 OTHER PULMONARY COLLAPSE: ICD-10-CM

## 2021-10-03 DIAGNOSIS — E11.9 TYPE 2 DIABETES MELLITUS WITHOUT COMPLICATIONS: ICD-10-CM

## 2021-10-03 DIAGNOSIS — Z95.5 PRESENCE OF CORONARY ANGIOPLASTY IMPLANT AND GRAFT: ICD-10-CM

## 2021-10-03 DIAGNOSIS — I48.92 UNSPECIFIED ATRIAL FLUTTER: ICD-10-CM

## 2021-10-03 DIAGNOSIS — Z87.891 PERSONAL HISTORY OF NICOTINE DEPENDENCE: ICD-10-CM

## 2021-10-03 DIAGNOSIS — Z98.890 OTHER SPECIFIED POSTPROCEDURAL STATES: Chronic | ICD-10-CM

## 2021-10-03 DIAGNOSIS — J96.01 ACUTE RESPIRATORY FAILURE WITH HYPOXIA: ICD-10-CM

## 2021-10-03 DIAGNOSIS — I31.3 PERICARDIAL EFFUSION (NONINFLAMMATORY): ICD-10-CM

## 2021-10-03 DIAGNOSIS — I48.91 UNSPECIFIED ATRIAL FIBRILLATION: ICD-10-CM

## 2021-10-03 DIAGNOSIS — J95.89 OTHER POSTPROCEDURAL COMPLICATIONS AND DISORDERS OF RESPIRATORY SYSTEM, NOT ELSEWHERE CLASSIFIED: ICD-10-CM

## 2021-10-03 DIAGNOSIS — J94.2 HEMOTHORAX: ICD-10-CM

## 2021-10-03 DIAGNOSIS — I50.20 UNSPECIFIED SYSTOLIC (CONGESTIVE) HEART FAILURE: ICD-10-CM

## 2021-10-03 DIAGNOSIS — J90 PLEURAL EFFUSION, NOT ELSEWHERE CLASSIFIED: ICD-10-CM

## 2021-10-03 DIAGNOSIS — Z85.89 PERSONAL HISTORY OF MALIGNANT NEOPLASM OF OTHER ORGANS AND SYSTEMS: ICD-10-CM

## 2021-10-03 DIAGNOSIS — N40.0 BENIGN PROSTATIC HYPERPLASIA WITHOUT LOWER URINARY TRACT SYMPTOMS: ICD-10-CM

## 2021-10-03 DIAGNOSIS — I25.10 ATHEROSCLEROTIC HEART DISEASE OF NATIVE CORONARY ARTERY WITHOUT ANGINA PECTORIS: ICD-10-CM

## 2021-10-03 DIAGNOSIS — J94.1 FIBROTHORAX: ICD-10-CM

## 2021-10-03 DIAGNOSIS — I11.0 HYPERTENSIVE HEART DISEASE WITH HEART FAILURE: ICD-10-CM

## 2021-10-03 DIAGNOSIS — Z92.3 PERSONAL HISTORY OF IRRADIATION: ICD-10-CM

## 2021-10-03 DIAGNOSIS — E78.5 HYPERLIPIDEMIA, UNSPECIFIED: ICD-10-CM

## 2021-10-03 LAB
A1C WITH ESTIMATED AVERAGE GLUCOSE RESULT: 5.9 % — HIGH (ref 4–5.6)
ALBUMIN SERPL ELPH-MCNC: 3.1 G/DL — LOW (ref 3.3–5)
ALBUMIN SERPL ELPH-MCNC: 3.2 G/DL — LOW (ref 3.3–5)
ALP SERPL-CCNC: 92 U/L — SIGNIFICANT CHANGE UP (ref 40–120)
ALP SERPL-CCNC: 96 U/L — SIGNIFICANT CHANGE UP (ref 40–120)
ALT FLD-CCNC: 24 U/L — SIGNIFICANT CHANGE UP (ref 10–45)
ALT FLD-CCNC: 24 U/L — SIGNIFICANT CHANGE UP (ref 10–45)
ANION GAP SERPL CALC-SCNC: 12 MMOL/L — SIGNIFICANT CHANGE UP (ref 5–17)
ANION GAP SERPL CALC-SCNC: 8 MMOL/L — SIGNIFICANT CHANGE UP (ref 5–17)
APPEARANCE UR: CLEAR — SIGNIFICANT CHANGE UP
APTT BLD: 29.1 SEC — SIGNIFICANT CHANGE UP (ref 27.5–35.5)
AST SERPL-CCNC: 20 U/L — SIGNIFICANT CHANGE UP (ref 10–40)
AST SERPL-CCNC: 24 U/L — SIGNIFICANT CHANGE UP (ref 10–40)
BACTERIA # UR AUTO: PRESENT /HPF
BASE EXCESS BLDV CALC-SCNC: 0.5 MMOL/L — SIGNIFICANT CHANGE UP (ref -2–3)
BASOPHILS # BLD AUTO: 0.04 K/UL — SIGNIFICANT CHANGE UP (ref 0–0.2)
BASOPHILS NFR BLD AUTO: 0.4 % — SIGNIFICANT CHANGE UP (ref 0–2)
BILIRUB SERPL-MCNC: 0.6 MG/DL — SIGNIFICANT CHANGE UP (ref 0.2–1.2)
BILIRUB SERPL-MCNC: 0.7 MG/DL — SIGNIFICANT CHANGE UP (ref 0.2–1.2)
BILIRUB UR-MCNC: NEGATIVE — SIGNIFICANT CHANGE UP
BLD GP AB SCN SERPL QL: NEGATIVE — SIGNIFICANT CHANGE UP
BUN SERPL-MCNC: 17 MG/DL — SIGNIFICANT CHANGE UP (ref 7–23)
BUN SERPL-MCNC: 20 MG/DL — SIGNIFICANT CHANGE UP (ref 7–23)
CA-I SERPL-SCNC: 1.16 MMOL/L — SIGNIFICANT CHANGE UP (ref 1.15–1.33)
CALCIUM SERPL-MCNC: 8.1 MG/DL — LOW (ref 8.4–10.5)
CALCIUM SERPL-MCNC: 8.4 MG/DL — SIGNIFICANT CHANGE UP (ref 8.4–10.5)
CHLORIDE SERPL-SCNC: 104 MMOL/L — SIGNIFICANT CHANGE UP (ref 96–108)
CHLORIDE SERPL-SCNC: 105 MMOL/L — SIGNIFICANT CHANGE UP (ref 96–108)
CO2 BLDV-SCNC: 27.4 MMOL/L — HIGH (ref 22–26)
CO2 SERPL-SCNC: 22 MMOL/L — SIGNIFICANT CHANGE UP (ref 22–31)
CO2 SERPL-SCNC: 24 MMOL/L — SIGNIFICANT CHANGE UP (ref 22–31)
COLOR SPEC: YELLOW — SIGNIFICANT CHANGE UP
CREAT SERPL-MCNC: 1.02 MG/DL — SIGNIFICANT CHANGE UP (ref 0.5–1.3)
CREAT SERPL-MCNC: 1.03 MG/DL — SIGNIFICANT CHANGE UP (ref 0.5–1.3)
DIFF PNL FLD: ABNORMAL
EOSINOPHIL # BLD AUTO: 0.1 K/UL — SIGNIFICANT CHANGE UP (ref 0–0.5)
EOSINOPHIL NFR BLD AUTO: 1 % — SIGNIFICANT CHANGE UP (ref 0–6)
EPI CELLS # UR: SIGNIFICANT CHANGE UP /HPF (ref 0–5)
ESTIMATED AVERAGE GLUCOSE: 123 MG/DL — HIGH (ref 68–114)
GAS PNL BLDA: SIGNIFICANT CHANGE UP
GAS PNL BLDV: 134 MMOL/L — LOW (ref 136–145)
GAS PNL BLDV: SIGNIFICANT CHANGE UP
GLUCOSE BLDC GLUCOMTR-MCNC: 115 MG/DL — HIGH (ref 70–99)
GLUCOSE BLDC GLUCOMTR-MCNC: 116 MG/DL — HIGH (ref 70–99)
GLUCOSE BLDC GLUCOMTR-MCNC: 116 MG/DL — HIGH (ref 70–99)
GLUCOSE BLDC GLUCOMTR-MCNC: 118 MG/DL — HIGH (ref 70–99)
GLUCOSE BLDC GLUCOMTR-MCNC: 118 MG/DL — HIGH (ref 70–99)
GLUCOSE BLDC GLUCOMTR-MCNC: 121 MG/DL — HIGH (ref 70–99)
GLUCOSE BLDC GLUCOMTR-MCNC: 122 MG/DL — HIGH (ref 70–99)
GLUCOSE BLDC GLUCOMTR-MCNC: 122 MG/DL — HIGH (ref 70–99)
GLUCOSE BLDC GLUCOMTR-MCNC: 124 MG/DL — HIGH (ref 70–99)
GLUCOSE BLDC GLUCOMTR-MCNC: 127 MG/DL — HIGH (ref 70–99)
GLUCOSE BLDC GLUCOMTR-MCNC: 132 MG/DL — HIGH (ref 70–99)
GLUCOSE SERPL-MCNC: 120 MG/DL — HIGH (ref 70–99)
GLUCOSE SERPL-MCNC: 135 MG/DL — HIGH (ref 70–99)
GLUCOSE UR QL: NEGATIVE — SIGNIFICANT CHANGE UP
HCO3 BLDV-SCNC: 26 MMOL/L — SIGNIFICANT CHANGE UP (ref 22–29)
HCT VFR BLD CALC: 26.7 % — LOW (ref 39–50)
HCT VFR BLD CALC: 28.4 % — LOW (ref 39–50)
HGB BLD-MCNC: 8.4 G/DL — LOW (ref 13–17)
HGB BLD-MCNC: 9.2 G/DL — LOW (ref 13–17)
IMM GRANULOCYTES NFR BLD AUTO: 0.8 % — SIGNIFICANT CHANGE UP (ref 0–1.5)
INR BLD: 1.33 — HIGH (ref 0.88–1.16)
KETONES UR-MCNC: 15 MG/DL
LACTATE SERPL-SCNC: 1.2 MMOL/L — SIGNIFICANT CHANGE UP (ref 0.5–2)
LEUKOCYTE ESTERASE UR-ACNC: NEGATIVE — SIGNIFICANT CHANGE UP
LYMPHOCYTES # BLD AUTO: 0.57 K/UL — LOW (ref 1–3.3)
LYMPHOCYTES # BLD AUTO: 5.7 % — LOW (ref 13–44)
MAGNESIUM SERPL-MCNC: 2.5 MG/DL — SIGNIFICANT CHANGE UP (ref 1.6–2.6)
MCHC RBC-ENTMCNC: 28.4 PG — SIGNIFICANT CHANGE UP (ref 27–34)
MCHC RBC-ENTMCNC: 28.9 PG — SIGNIFICANT CHANGE UP (ref 27–34)
MCHC RBC-ENTMCNC: 31.5 GM/DL — LOW (ref 32–36)
MCHC RBC-ENTMCNC: 32.4 GM/DL — SIGNIFICANT CHANGE UP (ref 32–36)
MCV RBC AUTO: 89.3 FL — SIGNIFICANT CHANGE UP (ref 80–100)
MCV RBC AUTO: 90.2 FL — SIGNIFICANT CHANGE UP (ref 80–100)
MONOCYTES # BLD AUTO: 0.78 K/UL — SIGNIFICANT CHANGE UP (ref 0–0.9)
MONOCYTES NFR BLD AUTO: 7.8 % — SIGNIFICANT CHANGE UP (ref 2–14)
NEUTROPHILS # BLD AUTO: 8.38 K/UL — HIGH (ref 1.8–7.4)
NEUTROPHILS NFR BLD AUTO: 84.3 % — HIGH (ref 43–77)
NITRITE UR-MCNC: NEGATIVE — SIGNIFICANT CHANGE UP
NRBC # BLD: 0 /100 WBCS — SIGNIFICANT CHANGE UP (ref 0–0)
NRBC # BLD: 0 /100 WBCS — SIGNIFICANT CHANGE UP (ref 0–0)
NT-PROBNP SERPL-SCNC: 3354 PG/ML — HIGH (ref 0–300)
PCO2 BLDV: 44 MMHG — SIGNIFICANT CHANGE UP (ref 42–55)
PH BLDV: 7.38 — SIGNIFICANT CHANGE UP (ref 7.32–7.43)
PH UR: 6 — SIGNIFICANT CHANGE UP (ref 5–8)
PHOSPHATE SERPL-MCNC: 3.7 MG/DL — SIGNIFICANT CHANGE UP (ref 2.5–4.5)
PLATELET # BLD AUTO: 284 K/UL — SIGNIFICANT CHANGE UP (ref 150–400)
PLATELET # BLD AUTO: 287 K/UL — SIGNIFICANT CHANGE UP (ref 150–400)
PO2 BLDV: 36 MMHG — SIGNIFICANT CHANGE UP (ref 25–45)
POTASSIUM BLDV-SCNC: 4.1 MMOL/L — SIGNIFICANT CHANGE UP (ref 3.5–5.1)
POTASSIUM SERPL-MCNC: 4.2 MMOL/L — SIGNIFICANT CHANGE UP (ref 3.5–5.3)
POTASSIUM SERPL-MCNC: 4.4 MMOL/L — SIGNIFICANT CHANGE UP (ref 3.5–5.3)
POTASSIUM SERPL-SCNC: 4.2 MMOL/L — SIGNIFICANT CHANGE UP (ref 3.5–5.3)
POTASSIUM SERPL-SCNC: 4.4 MMOL/L — SIGNIFICANT CHANGE UP (ref 3.5–5.3)
PROT SERPL-MCNC: 5.2 G/DL — LOW (ref 6–8.3)
PROT SERPL-MCNC: 5.5 G/DL — LOW (ref 6–8.3)
PROT UR-MCNC: ABNORMAL MG/DL
PROTHROM AB SERPL-ACNC: 15.7 SEC — HIGH (ref 10.6–13.6)
RBC # BLD: 2.96 M/UL — LOW (ref 4.2–5.8)
RBC # BLD: 3.18 M/UL — LOW (ref 4.2–5.8)
RBC # FLD: 13.4 % — SIGNIFICANT CHANGE UP (ref 10.3–14.5)
RBC # FLD: 13.4 % — SIGNIFICANT CHANGE UP (ref 10.3–14.5)
RBC CASTS # UR COMP ASSIST: ABNORMAL /HPF
RH IG SCN BLD-IMP: POSITIVE — SIGNIFICANT CHANGE UP
SAO2 % BLDV: 60.2 % — LOW (ref 67–88)
SODIUM SERPL-SCNC: 137 MMOL/L — SIGNIFICANT CHANGE UP (ref 135–145)
SODIUM SERPL-SCNC: 138 MMOL/L — SIGNIFICANT CHANGE UP (ref 135–145)
SP GR SPEC: 1.02 — SIGNIFICANT CHANGE UP (ref 1–1.03)
TSH SERPL-MCNC: 0.74 UIU/ML — SIGNIFICANT CHANGE UP (ref 0.27–4.2)
UROBILINOGEN FLD QL: 1 E.U./DL — SIGNIFICANT CHANGE UP
WBC # BLD: 10.81 K/UL — HIGH (ref 3.8–10.5)
WBC # BLD: 9.95 K/UL — SIGNIFICANT CHANGE UP (ref 3.8–10.5)
WBC # FLD AUTO: 10.81 K/UL — HIGH (ref 3.8–10.5)
WBC # FLD AUTO: 9.95 K/UL — SIGNIFICANT CHANGE UP (ref 3.8–10.5)
WBC UR QL: < 5 /HPF — SIGNIFICANT CHANGE UP

## 2021-10-03 PROCEDURE — 71045 X-RAY EXAM CHEST 1 VIEW: CPT | Mod: 26,77

## 2021-10-03 PROCEDURE — 43752 NASAL/OROGASTRIC W/TUBE PLMT: CPT

## 2021-10-03 PROCEDURE — 71045 X-RAY EXAM CHEST 1 VIEW: CPT | Mod: 26

## 2021-10-03 PROCEDURE — 93010 ELECTROCARDIOGRAM REPORT: CPT

## 2021-10-03 PROCEDURE — 99238 HOSP IP/OBS DSCHRG MGMT 30/<: CPT | Mod: 24

## 2021-10-03 PROCEDURE — 99292 CRITICAL CARE ADDL 30 MIN: CPT

## 2021-10-03 PROCEDURE — 99291 CRITICAL CARE FIRST HOUR: CPT

## 2021-10-03 RX ORDER — ALBUMIN HUMAN 25 %
250 VIAL (ML) INTRAVENOUS ONCE
Refills: 0 | Status: COMPLETED | OUTPATIENT
Start: 2021-10-03 | End: 2021-10-03

## 2021-10-03 RX ORDER — FENTANYL CITRATE 50 UG/ML
25 INJECTION INTRAVENOUS
Refills: 0 | Status: DISCONTINUED | OUTPATIENT
Start: 2021-10-03 | End: 2021-10-06

## 2021-10-03 RX ORDER — ACETAMINOPHEN 500 MG
1000 TABLET ORAL ONCE
Refills: 0 | Status: DISCONTINUED | OUTPATIENT
Start: 2021-10-03 | End: 2021-10-05

## 2021-10-03 RX ORDER — PHENYLEPHRINE HYDROCHLORIDE 10 MG/ML
0.1 INJECTION INTRAVENOUS
Qty: 40 | Refills: 0 | Status: DISCONTINUED | OUTPATIENT
Start: 2021-10-03 | End: 2021-10-06

## 2021-10-03 RX ORDER — CHLORHEXIDINE GLUCONATE 213 G/1000ML
1 SOLUTION TOPICAL ONCE
Refills: 0 | Status: COMPLETED | OUTPATIENT
Start: 2021-10-03 | End: 2021-10-03

## 2021-10-03 RX ORDER — CHLORHEXIDINE GLUCONATE 213 G/1000ML
10 SOLUTION TOPICAL ONCE
Refills: 0 | Status: COMPLETED | OUTPATIENT
Start: 2021-10-03 | End: 2021-10-03

## 2021-10-03 RX ORDER — CHLORHEXIDINE GLUCONATE 213 G/1000ML
15 SOLUTION TOPICAL EVERY 12 HOURS
Refills: 0 | Status: DISCONTINUED | OUTPATIENT
Start: 2021-10-03 | End: 2021-10-06

## 2021-10-03 RX ORDER — SODIUM CHLORIDE 9 MG/ML
3 INJECTION INTRAMUSCULAR; INTRAVENOUS; SUBCUTANEOUS EVERY 8 HOURS
Refills: 0 | Status: DISCONTINUED | OUTPATIENT
Start: 2021-10-03 | End: 2021-10-04

## 2021-10-03 RX ORDER — DEXMEDETOMIDINE HYDROCHLORIDE IN 0.9% SODIUM CHLORIDE 4 UG/ML
0.4 INJECTION INTRAVENOUS
Qty: 400 | Refills: 0 | Status: DISCONTINUED | OUTPATIENT
Start: 2021-10-03 | End: 2021-10-04

## 2021-10-03 RX ORDER — METOPROLOL TARTRATE 50 MG
2.5 TABLET ORAL EVERY 6 HOURS
Refills: 0 | Status: DISCONTINUED | OUTPATIENT
Start: 2021-10-03 | End: 2021-10-05

## 2021-10-03 RX ORDER — AMIODARONE HYDROCHLORIDE 400 MG/1
0.25 TABLET ORAL
Qty: 900 | Refills: 0 | Status: COMPLETED | OUTPATIENT
Start: 2021-10-03 | End: 2021-10-04

## 2021-10-03 RX ORDER — ASPIRIN/CALCIUM CARB/MAGNESIUM 324 MG
81 TABLET ORAL DAILY
Refills: 0 | Status: DISCONTINUED | OUTPATIENT
Start: 2021-10-03 | End: 2021-10-04

## 2021-10-03 RX ORDER — HYDROMORPHONE HYDROCHLORIDE 2 MG/ML
0.5 INJECTION INTRAMUSCULAR; INTRAVENOUS; SUBCUTANEOUS ONCE
Refills: 0 | Status: DISCONTINUED | OUTPATIENT
Start: 2021-10-03 | End: 2021-10-03

## 2021-10-03 RX ORDER — MULTIVIT-MIN/FERROUS GLUCONATE 9 MG/15 ML
1 LIQUID (ML) ORAL
Qty: 0 | Refills: 0 | DISCHARGE

## 2021-10-03 RX ORDER — ATORVASTATIN CALCIUM 80 MG/1
40 TABLET, FILM COATED ORAL AT BEDTIME
Refills: 0 | Status: DISCONTINUED | OUTPATIENT
Start: 2021-10-03 | End: 2021-10-05

## 2021-10-03 RX ORDER — HEPARIN SODIUM 5000 [USP'U]/ML
5000 INJECTION INTRAVENOUS; SUBCUTANEOUS EVERY 8 HOURS
Refills: 0 | Status: DISCONTINUED | OUTPATIENT
Start: 2021-10-03 | End: 2021-10-04

## 2021-10-03 RX ORDER — CHLORHEXIDINE GLUCONATE 213 G/1000ML
1 SOLUTION TOPICAL ONCE
Refills: 0 | Status: COMPLETED | OUTPATIENT
Start: 2021-10-04 | End: 2021-10-04

## 2021-10-03 RX ORDER — ALBUMIN HUMAN 25 %
250 VIAL (ML) INTRAVENOUS
Refills: 0 | Status: COMPLETED | OUTPATIENT
Start: 2021-10-03 | End: 2021-10-04

## 2021-10-03 RX ORDER — SODIUM CHLORIDE 9 MG/ML
1000 INJECTION, SOLUTION INTRAVENOUS
Refills: 0 | Status: DISCONTINUED | OUTPATIENT
Start: 2021-10-03 | End: 2021-10-04

## 2021-10-03 RX ORDER — PANTOPRAZOLE SODIUM 20 MG/1
40 TABLET, DELAYED RELEASE ORAL DAILY
Refills: 0 | Status: DISCONTINUED | OUTPATIENT
Start: 2021-10-03 | End: 2021-10-04

## 2021-10-03 RX ORDER — PROPOFOL 10 MG/ML
5 INJECTION, EMULSION INTRAVENOUS
Qty: 1000 | Refills: 0 | Status: DISCONTINUED | OUTPATIENT
Start: 2021-10-03 | End: 2021-10-06

## 2021-10-03 RX ORDER — RIVASTIGMINE 4.6 MG/24H
1 PATCH, EXTENDED RELEASE TRANSDERMAL
Qty: 0 | Refills: 0 | DISCHARGE

## 2021-10-03 RX ADMIN — CHLORHEXIDINE GLUCONATE 15 MILLILITER(S): 213 SOLUTION TOPICAL at 06:08

## 2021-10-03 RX ADMIN — SODIUM CHLORIDE 50 MILLILITER(S): 9 INJECTION, SOLUTION INTRAVENOUS at 21:49

## 2021-10-03 RX ADMIN — HYDROMORPHONE HYDROCHLORIDE 0.5 MILLIGRAM(S): 2 INJECTION INTRAMUSCULAR; INTRAVENOUS; SUBCUTANEOUS at 02:00

## 2021-10-03 RX ADMIN — Medication 100 MILLIGRAM(S): at 06:09

## 2021-10-03 RX ADMIN — CHLORHEXIDINE GLUCONATE 10 MILLILITER(S): 213 SOLUTION TOPICAL at 22:04

## 2021-10-03 RX ADMIN — SODIUM CHLORIDE 3 MILLILITER(S): 9 INJECTION INTRAMUSCULAR; INTRAVENOUS; SUBCUTANEOUS at 21:57

## 2021-10-03 RX ADMIN — HYDROMORPHONE HYDROCHLORIDE 0.5 MILLIGRAM(S): 2 INJECTION INTRAMUSCULAR; INTRAVENOUS; SUBCUTANEOUS at 02:15

## 2021-10-03 RX ADMIN — Medication 125 MILLILITER(S): at 17:00

## 2021-10-03 RX ADMIN — CHLORHEXIDINE GLUCONATE 1 APPLICATION(S): 213 SOLUTION TOPICAL at 20:27

## 2021-10-03 RX ADMIN — DEXMEDETOMIDINE HYDROCHLORIDE IN 0.9% SODIUM CHLORIDE 11.2 MICROGRAM(S)/KG/HR: 4 INJECTION INTRAVENOUS at 07:36

## 2021-10-03 RX ADMIN — PHENYLEPHRINE HYDROCHLORIDE 3.36 MICROGRAM(S)/KG/MIN: 10 INJECTION INTRAVENOUS at 23:35

## 2021-10-03 RX ADMIN — PROPOFOL 5.37 MICROGRAM(S)/KG/MIN: 10 INJECTION, EMULSION INTRAVENOUS at 07:37

## 2021-10-03 RX ADMIN — Medication 125 MILLILITER(S): at 15:26

## 2021-10-03 RX ADMIN — CHLORHEXIDINE GLUCONATE 15 MILLILITER(S): 213 SOLUTION TOPICAL at 17:29

## 2021-10-03 RX ADMIN — CHLORHEXIDINE GLUCONATE 1 APPLICATION(S): 213 SOLUTION TOPICAL at 21:57

## 2021-10-03 RX ADMIN — ATORVASTATIN CALCIUM 40 MILLIGRAM(S): 80 TABLET, FILM COATED ORAL at 21:53

## 2021-10-03 RX ADMIN — AMIODARONE HYDROCHLORIDE 16.7 MG/MIN: 400 TABLET ORAL at 07:37

## 2021-10-03 RX ADMIN — Medication 250 MILLILITER(S): at 23:39

## 2021-10-03 RX ADMIN — HEPARIN SODIUM 5000 UNIT(S): 5000 INJECTION INTRAVENOUS; SUBCUTANEOUS at 21:54

## 2021-10-03 RX ADMIN — SODIUM CHLORIDE 3 MILLILITER(S): 9 INJECTION INTRAMUSCULAR; INTRAVENOUS; SUBCUTANEOUS at 14:00

## 2021-10-03 NOTE — H&P ADULT - NSHPPHYSICALEXAM_GEN_ALL_CORE
GEN: Intubated, sedated.  Not breathing over the vent.   Psych: Mood not assessed.    Neuro: A&Ox3.  No focal deficits.  Moving all extremities.   HEENT: No obvious abnormalities  CV: S1S2, regular, no murmurs appreciated.  No carotid bruits.  No JVD  Lungs: Clear B/L.  No wheezing, rales or rhonchi  ABD: Soft, non-tender, non-distended.  +Bowel sounds  EXT: Warm and well perfused.  No peripheral edema noted  Musculoskeletal: Moving all extremities with normal ROM, no joint swelling  PV: Pedal pulses palpable GEN: Intubated, sedated.  Not breathing over the vent.   Psych: Mood not assessed.    Neuro: Not tested.   HEENT: ETT in place.    CV: S1S2, regular, no murmurs appreciated.  No carotid bruits.  No JVD  Lungs: B/L rhonchi.  Upper/anterior chest examined only.   ABD: Soft, non-tender, non-distended.  +Bowel sounds  EXT: Warm and well perfused.  No peripheral edema noted  Musculoskeletal: Moving all extremities with normal ROM, no joint swelling  PV: Pedal pulses palpable  Subxiphoid incision w/ dressing in place, not saturated.    Pericardial drain in place, hooked up to Pleurovac to waterseal.  Minimal drainage in cannister.

## 2021-10-03 NOTE — H&P ADULT - ASSESSMENT
**Obtained from [facility] discharge note, as patient arrived intubated and sedated.  No family present, unable to perform subjective**    72 yo Croatian/English speaking male, former smoker w/ PMHx of severe mitral valve insufficiency (recent MV repair 9/20/21), atrial fibrillation, HFrEF (30%), CAD (s/p PCI 2018), HTN, HLD, DM, tonsil cancer s/p surgery + radiation in 2019 who presented to [facility] with acute SOB.  At the time, he denied chest pain, palpitations, diaphoresis. No F/C/NS/N/V/D. No cough, calf tenderness or increased LE edema.  Previous hospital course here at [facility] post MVR was complicated by right hemothorax requiring thoracocentesis, ultimately discharged home on 9/25/21.     In the ED at [facility], patient was initially hypertensive, although became hypotensive with SBP 90s s/p nitroglycerin. Levophed was started for pressor support.     Patient admitted to CICU;  TTE showed "Severe LV systolic fxn, EF 29%, large pericardial effusion posterior and lateral to the LV (2.5cm), the pericardial effusion measures 1.2cm adjacent to the RV, reduced diastolic expansion of the apical free wall of the RV, however diagnosis of tamponade limited by ". Findings on TTE were significant for pericardial tamponade. IR was consulted and a CT chest w/out contrast was completed. After quick review, IR stated it is not drainable.     Dr. Pena took the patient to the OR on 10/2/21 for a subxiphoid pericardial window, draining 500cc of fluid with relief of tamponade and placement of pericardial drain. Patient's HR was still aflutter 120s-130s, and an Amiodarone drip was initiated with improvement of HR to 60s. Patient was stable for transfer here today for continued management of his pericardial drain and evaluation by thoracic surgeons for possible complex decortication of right lung.  Currently he remains intubated and sedated.      Problem 1.  Pericardial effusion.  Drain in place.     Problem 2.  Complex right side, loculated pleural effusion.  Dr. Loera/Dr. Loving to review case and discuss possibility of thoracotomy? decortication?    Problem 3.  HTN    Problem 4.  HLD    Problem 5.  DM **Obtained from [facility] discharge note, as patient arrived intubated and sedated.  No family present, unable to perform subjective**    72 yo Bulgarian/English speaking male, former smoker w/ PMHx of severe mitral valve insufficiency (recent MV repair 9/20/21), atrial fibrillation, HFrEF (30%), CAD (s/p PCI 2018), HTN, HLD, DM, tonsil cancer s/p surgery + radiation in 2019 who presented to [facility] with acute SOB.  At the time, he denied chest pain, palpitations, diaphoresis. No F/C/NS/N/V/D. No cough, calf tenderness or increased LE edema.  Previous hospital course here at [facility] post MVR was complicated by right hemothorax requiring thoracocentesis (dry tap), and post op afib, sent home on BB and amio maintenance, ultimately discharged home on 9/25/21.     In the ED at [facility], patient was initially hypertensive, although became hypotensive with SBP 90s s/p nitroglycerin. Levophed was started for pressor support.     Patient admitted to CICU;  TTE showed "Severe LV systolic fxn, EF 29%, large pericardial effusion posterior and lateral to the LV (2.5cm), the pericardial effusion measures 1.2cm adjacent to the RV, reduced diastolic expansion of the apical free wall of the RV, however diagnosis of tamponade limited by ". Findings on TTE were significant for pericardial tamponade. IR was consulted and a CT chest w/out contrast was completed. After quick review, IR stated it is not drainable.     Dr. Pena took the patient to the OR on 10/2/21 for a subxiphoid pericardial window, draining 500cc of fluid with relief of tamponade and placement of pericardial drain. Patient's HR was still aflutter 120s-130s, and an Amiodarone drip was initiated with improvement of HR to 60s. Patient was stable for transfer here today for continued management of his pericardial drain and evaluation by thoracic surgeons for possible complex decortication of right lung.  Currently he remains intubated and sedated.      Problem 1.  Pericardial effusion.  Drain in place.  Keep to waterseal for  now.  Monitor drainage.  Admit to ICU for further care.  Keep intubated for now.  CMV, precedex as needed.  F/U CXR & admission labs.      Problem 2.  Complex right side, loculated pleural effusion.  Dr. Loera/Dr. Loving to review case and discuss possibility of thoracotomy? decortication?    Problem 3.  HTN, currently stable.  Holding anti-hypertensives for now.      Problem 4.  HLD, statin when appropriate.      Problem 5.  DM, Insulin gtt if needed.      Problem 6.  Aflutter, rate is controlled.  Had previous post op afib, sent home on PO amio maintenance and BB.  Will resume if tolerates.

## 2021-10-03 NOTE — DISCHARGE NOTE PROVIDER - NSDCPNSUBOBJ_GEN_ALL_CORE
Patient seen and examined at bedside. Patient is intubated and sedated. He is POD #1 s/p Pericardial window subxiphoid approach. No acute events overnight. O2 requirements were gradually weaned. Patient remains on Precedex and Propofol for sedation. Remains on Amiodarone drip for rapid aflutter, now rate controlled. Patient seen and examined at bedside. Patient is intubated and sedated. He is POD #1 s/p Pericardial window subxiphoid approach. No acute events overnight. O2 requirements were gradually weaned. Patient remains on Precedex and Propofol for sedation. Remains on Amiodarone drip for rapid aflutter, now rate controlled. Minimal output from pericardial drain. Stable for transfer to [facility] for continued care.    Physical Exam:  General: Intubated and sedated, NAD.  Neuro: Responds to all commands in all 4 extremities.  Skin: Chest tube noted with minimal output. Incision dressing c/d/i.  Head: NCAT.  Eyes: Pupils equal and reactive.  Neck: No JVD.  CV: Aflutter 60s, S1S2.  Pulm: Diminished breath sounds on right side however audible and coarse. Left side clear. No wheezing.  : Mcdowell in place making good urine. No hematuria.  GI: Abd soft, NT, ND, hypoactive BS throughout.  Vasc: Groins soft without bleeding or hematoma. Incision from old cannulation site in right groin c/d/i. Doppler distal pulses b/l.

## 2021-10-03 NOTE — PROGRESS NOTE ADULT - SUBJECTIVE AND OBJECTIVE BOX
CRITICAL CARE ATTENDING - CTICU    MEDICATIONS  (STANDING):  aMIOdarone Infusion 0.5 mG/Min (16.7 mL/Hr) IV Continuous <Continuous>  cefuroxime  IVPB 1500 milliGRAM(s) IV Intermittent every 8 hours  chlorhexidine 0.12% Liquid 5 milliLiter(s) Oral Mucosa two times a day  chlorhexidine 0.12% Liquid 15 milliLiter(s) Oral Mucosa every 12 hours  dexMEDEtomidine Infusion 0.5 MICROgram(s)/kG/Hr (11.2 mL/Hr) IV Continuous <Continuous>  dextrose 50% Injectable 50 milliLiter(s) IV Push every 15 minutes  dextrose 50% Injectable 25 milliLiter(s) IV Push every 15 minutes  influenza   Vaccine 0.5 milliLiter(s) IntraMuscular once  insulin regular Infusion 3 Unit(s)/Hr (3 mL/Hr) IV Continuous <Continuous>  meperidine     Injectable 25 milliGRAM(s) IV Push once  niCARdipine Infusion 5 mG/Hr (25 mL/Hr) IV Continuous <Continuous>  norepinephrine Infusion 0.03 MICROgram(s)/kG/Min (5.03 mL/Hr) IV Continuous <Continuous>  pantoprazole  Injectable 40 milliGRAM(s) IV Push daily  polyethylene glycol 3350 17 Gram(s) Oral daily  potassium chloride  10 mEq/50 mL IVPB 10 milliEquivalent(s) IV Intermittent every 1 hour  potassium chloride  10 mEq/50 mL IVPB 10 milliEquivalent(s) IV Intermittent every 1 hour  potassium chloride  10 mEq/50 mL IVPB 10 milliEquivalent(s) IV Intermittent every 1 hour  propofol Infusion 10 MICROgram(s)/kG/Min (5.37 mL/Hr) IV Continuous <Continuous>  sodium chloride 0.9%. 1000 milliLiter(s) (10 mL/Hr) IV Continuous <Continuous>                     8.4    10.81 )-----------( 284      ( 03 Oct 2021 00:24 )             26.7       10-03    138  |  104  |  17  ----------------------------<  120<H>  4.2   |  22  |  1.03    Ca    8.1<L>      03 Oct 2021 00:24  Phos  3.7     10-03  Mg     2.5     10-    TPro  5.2<L>  /  Alb  3.2<L>  /  TBili  0.6  /  DBili  x   /  AST  20  /  ALT  24  /  AlkPhos  92  10-03      PT/INR - ( 02 Oct 2021 17:03 )   PT: 14.8 sec;   INR: 1.25 ratio         PTT - ( 02 Oct 2021 17:03 )  PTT:25.8 sec    Mode: AC/ CMV (Assist Control/ Continuous Mandatory Ventilation)  RR (machine): 12  TV (machine): 500  FiO2: 50  PEEP: 7      Critically Ill patient  : [ ] preoperative ,   [x ] post operative    Requires :  [x Arterial Line   [ x] Central Line  [x ] PA catheter  [ ] IABP  [ ] ECMO  [ ] LVAD  [x ] Ventilator  [ ] pacemaker [ ] Impella.                      [ x] ABG's     [x ] Pulse Oxymetry Monitoring  Bedside evaluation , monitoring , treatment of hemodynamics , fluids , IVP/ IVCD meds.        Diagnosis:     POD 1 - drainage of pericardial fluid     Cardiac Tamponade     A Fib    tachycardia    Post Op hypertension     Hemodynamic lability,  instability. Requires IVCD [ ] vasopressors [ ] inotropes  [ x] vasodilator  [ ]IVSS fluid  to maintain MAP, perfusion, C.I.     s/p Transthoracic MVR last week / R hemothorax - requires decortication     CHF- acute [ x]   chronic [ x]    systolic [x ]   diatolic [ ]          - Echo- EF - 40-30's            [ ] RV dysfunction          - Cxr-cardiomegally, edema          - Clinical-  [ ]inotropes   [x ]pressors   [ ]diuresis   [ ]IABP   [ ]ECMO   [ ]LVAD   [ ]Respiratory Failure    Ventilator Management:  [ x]AC-rest    [x ]CPAP-PS Wean this PM     [ ]Trach Collar     [ ]Extubate    [ ] T-Piece  [ ]peep>5     Requires chest PT, pulmonary toilet, ambu bagging, suctioning to maintain SaO2,  patent airway and treat atelectasis.     [place]wan catheter interpretation and therapeutic management of unstable hemodynamics     Chest Tube Drainage     ECG     Hyponatremia     IVCD Insulin                - ITime: 1  MAP: 11  PIP: 25      Daily Height in cm: 153.92 (02 Oct 2021 14:30)    Daily Weight in k.5 (03 Oct 2021 00:00)      10-01 @ 07:01  -  10-02 @ 07:00  --------------------------------------------------------  IN: 38.6 mL / OUT: 775 mL / NET: -736.4 mL    10-02 @ 07:01    10 @ 06:10  --------------------------------------------------------  IN: 1891.7 mL / OUT: 3145 mL / NET: -1253.3 mL                      By signing my name below, I, Diane Menezes, attest that this documentation has been prepared under the direction and in the presence of Mukesh Ulloa MD.   Electronically Signed: Mariia Ramos 10-03-21 @ 06:10      Discussed with CT surgeon, Physician Assistant - Nurse Practitioner- Critical care medicine team.   Discussed at  AM / PM rounds.   Chart, labs , films reviewed.    Cumulative Critical Care Time Given Today:  CRITICAL CARE ATTENDING - CTICU    MEDICATIONS  (STANDING):  aMIOdarone Infusion 0.5 mG/Min (16.7 mL/Hr) IV Continuous <Continuous>  cefuroxime  IVPB 1500 milliGRAM(s) IV Intermittent every 8 hours  chlorhexidine 0.12% Liquid 5 milliLiter(s) Oral Mucosa two times a day  chlorhexidine 0.12% Liquid 15 milliLiter(s) Oral Mucosa every 12 hours  dexMEDEtomidine Infusion 0.5 MICROgram(s)/kG/Hr (11.2 mL/Hr) IV Continuous <Continuous>  dextrose 50% Injectable 50 milliLiter(s) IV Push every 15 minutes  dextrose 50% Injectable 25 milliLiter(s) IV Push every 15 minutes  influenza   Vaccine 0.5 milliLiter(s) IntraMuscular once  insulin regular Infusion 3 Unit(s)/Hr (3 mL/Hr) IV Continuous <Continuous>  meperidine     Injectable 25 milliGRAM(s) IV Push once  niCARdipine Infusion 5 mG/Hr (25 mL/Hr) IV Continuous <Continuous>  norepinephrine Infusion 0.03 MICROgram(s)/kG/Min (5.03 mL/Hr) IV Continuous <Continuous>  pantoprazole  Injectable 40 milliGRAM(s) IV Push daily  polyethylene glycol 3350 17 Gram(s) Oral daily  potassium chloride  10 mEq/50 mL IVPB 10 milliEquivalent(s) IV Intermittent every 1 hour  potassium chloride  10 mEq/50 mL IVPB 10 milliEquivalent(s) IV Intermittent every 1 hour  potassium chloride  10 mEq/50 mL IVPB 10 milliEquivalent(s) IV Intermittent every 1 hour  propofol Infusion 10 MICROgram(s)/kG/Min (5.37 mL/Hr) IV Continuous <Continuous>  sodium chloride 0.9%. 1000 milliLiter(s) (10 mL/Hr) IV Continuous <Continuous>                     8.4    10.81 )-----------( 284      ( 03 Oct 2021 00:24 )             26.7       10-03    138  |  104  |  17  ----------------------------<  120<H>  4.2   |  22  |  1.03    Ca    8.1<L>      03 Oct 2021 00:24  Phos  3.7     10-03  Mg     2.5     10-    TPro  5.2<L>  /  Alb  3.2<L>  /  TBili  0.6  /  DBili  x   /  AST  20  /  ALT  24  /  AlkPhos  92  10-      PT/INR - ( 02 Oct 2021 17:03 )   PT: 14.8 sec;   INR: 1.25 ratio         PTT - ( 02 Oct 2021 17:03 )  PTT:25.8 sec    Mode: AC/ CMV (Assist Control/ Continuous Mandatory Ventilation)  RR (machine): 12  TV (machine): 500  FiO2: 50  PEEP: 7      Critically Ill patient  : [ ] preoperative ,   [x ] post operative    Requires :  [x Arterial Line   [ x] Central Line  [x ] PA catheter  [ ] IABP  [ ] ECMO  [ ] LVAD  [x ] Ventilator  [ ] pacemaker [ ] Impella.                      [ x] ABG's     [x ] Pulse Oxymetry Monitoring  Bedside evaluation , monitoring , treatment of hemodynamics , fluids , IVP/ IVCD meds.        Diagnosis:     POD 1 - drainage of pericardial fluid     Cardiac Tamponade     A Fib    tachycardia    Post Op hypertension     Hypotension a/w Hypertension, requiring intravenous medication.     Hemodynamic lability,  instability. Requires IVCD [ x] vasopressors  - on/off [ ] inotropes  [ x] vasodilator  [ x]IVSS fluid  to maintain MAP, perfusion, C.I.     s/p Transthoracic MVR last week / R hemothorax - requires decortication     CHF- acute [ x]   chronic [ x]    systolic [x ]   diatolic [ ]          - Echo- EF - 40-30's            [ ] RV dysfunction          - Cxr-cardiomegally, edema          - Clinical-  [ ]inotropes   [x ]pressors   [ ]diuresis   [ ]IABP   [ ]ECMO   [ ]LVAD   [ ]Respiratory Failure    Ventilator Management:  [ x]AC-rest    [x ]CPAP-PS Wean this PM     [ ]Trach Collar     [ ]Extubate    [ ] T-Piece  [ ]peep>5     Requires chest PT, pulmonary toilet, ambu bagging, suctioning to maintain SaO2,  patent airway and treat atelectasis.     Difficult weaning process - multiple organ system involvement in critically ill patient     [place] Juwan catheter interpretation and therapeutic management of unstable hemodynamics     Chest Tube Drainage     ECG     Hyponatremia     IVCD Insulin                - ITime: 1  MAP: 11  PIP: 25      Daily Height in cm: 153.92 (02 Oct 2021 14:30)    Daily Weight in k.5 (03 Oct 2021 00:00)      10-01 @ 07:01  -  10-02 @ 07:00  --------------------------------------------------------  IN: 38.6 mL / OUT: 775 mL / NET: -736.4 mL    10-02 @ 07:01  -  10-03 @ 06:10  --------------------------------------------------------  IN: 1891.7 mL / OUT: 3145 mL / NET: -1253.3 mL                      By signing my name below, I, Diane Menezes, attest that this documentation has been prepared under the direction and in the presence of Mukesh Ulloa MD.   Electronically Signed: Mariia Ramos 10-03-21 @ 06:10    I, Mukesh Ulloa, personally performed the services described in this documentation. All medical record entries made by the scribe were at my direction and in my presence. I have reviewed the chart and agree that the record reflects my personal performance and is accurate and complete.   Mukesh Ulloa MD.       Discussed with CT surgeon, Physician Assistant - Nurse Practitioner- Critical care medicine team.   Discussed at  AM / PM rounds.   Chart, labs , films reviewed.    Cumulative Critical Care Time Given Today:  30 min

## 2021-10-03 NOTE — DISCHARGE NOTE PROVIDER - NSDCCPTREATMENT_GEN_ALL_CORE_FT
PRINCIPAL PROCEDURE  Procedure: Creation, pericardial window, subxiphoid approach  Findings and Treatment:

## 2021-10-03 NOTE — H&P ADULT - NSHPREVIEWOFSYSTEMS_GEN_ALL_CORE
Review of Systems  CONSTITUTIONAL:  Denies Fevers / chills, sweats, fatigue, weight loss, weight gain                                      *See HPI*  NEURO:  Denies parethesias, seizures, syncope, confusion                                                                                EYES:  Denies Blurry vision, discharge, pain, loss of vision                                                                                    ENMT:  Denies Difficulty hearing, vertigo, dysphagia, epistaxis, recent dental work                                       CV:  Denies Chest pain, palpitations, WALLS, orthopnea                                                                                          RESPIRATORY:  Denies Wheezing, SOB, cough / sputum, hemoptysis                                                                GI:  Denies Nausea, vomiting, diarrhea, constipation, melena, difficulty swallowing                                               : Denies Hematuria, dysuria, urgency, incontinence                                                                                         MUSCULOSKELETAL:  Denies arthritis, joint swelling, muscle weakness                                                             SKIN/BREAST:  Denies rash, itching, hair loss, masses                                                                                            PSYCH:  Denies depression, anxiety, suicidal ideation                                                                                               HEME/LYMPH:  Denies bruises easily, enlarged lymph nodes, tender lymph nodes                                        ENDOCRINE:  Denies cold intolerance, heat intolerance, polydipsia

## 2021-10-03 NOTE — DISCHARGE NOTE PROVIDER - NSDCFUSCHEDAPPT_GEN_ALL_CORE_FT
ALAINA VILLEDA ; 10/05/2021 ; [facility] CT Surg [address] ALAINA VILLEDA ; 10/03/2021 ; [facility] PreAdmits  ALAINA VILLEDA ; 10/05/2021 ; [facility] CT Surg [address]

## 2021-10-03 NOTE — DISCHARGE NOTE PROVIDER - NSDCCPCAREPLAN_GEN_ALL_CORE_FT
PRINCIPAL DISCHARGE DIAGNOSIS  Diagnosis: Hemothorax, right  Assessment and Plan of Treatment:       SECONDARY DISCHARGE DIAGNOSES  Diagnosis: Decompensated heart failure  Assessment and Plan of Treatment:     Diagnosis: Pericardial tamponade  Assessment and Plan of Treatment:     Diagnosis: Atrial flutter  Assessment and Plan of Treatment:     Diagnosis: S/P MVR (mitral valve repair)  Assessment and Plan of Treatment:

## 2021-10-03 NOTE — H&P ADULT - HISTORY OF PRESENT ILLNESS
**Obtained from [facility] discharge note, as patient arrived intubated and sedated.  No family present, unable to perform subjective**    70 yo Azeri/English speaking male, former smoker w/ PMHx of severe mitral valve insufficiency (recent MV repair 9/20/21), atrial fibrillation, HFrEF (30%), CAD (s/p PCI 2018), HTN, HLD, DM, tonsil cancer s/p surgery + radiation in 2019 who presented to [facility] with acute SOB.  At the time, he denied chest pain, palpitations, diaphoresis. No F/C/NS/N/V/D. No cough, calf tenderness or increased LE edema.  Previous hospital course here at [facility] post MVR was complicated by right hemothorax requiring thoracocentesis, ultimately discharged home on 9/25/21.     In the ED at [facility], patient was initially hypertensive, although became hypotensive with SBP 90s s/p nitroglycerin. Levophed was started for pressor support.     Patient admitted to CICU;  TTE showed "Severe LV systolic fxn, EF 29%, large pericardial effusion posterior and lateral to the LV (2.5cm), the pericardial effusion measures 1.2cm adjacent to the RV, reduced diastolic expansion of the apical free wall of the RV, however diagnosis of tamponade limited by ". Findings on TTE were significant for pericardial tamponade. IR was consulted and a CT chest w/out contrast was completed. After quick review, IR stated it is not drainable.     Dr. Pena took the patient to the OR on 10/2/21 for a subxiphoid pericardial window, draining 500cc of fluid with relief of tamponade and placement of pericardial drain. Patient's HR was still aflutter 120s-130s, and an Amiodarone drip was initiated with improvement of HR to 60s. Patient was stable for transfer here today for continued management of his pericardial drain and evaluation by thoracic surgeons for possible complex decortication of right lung.  Currently he remains intubated and sedated.     **Obtained from [facility] discharge note, as patient arrived intubated and sedated.  No family present, unable to perform subjective**    72 yo Irish/English speaking male, former smoker w/ PMHx of severe mitral valve insufficiency (recent MV repair 9/20/21), atrial fibrillation, HFrEF (30%), CAD (s/p PCI 2018), HTN, HLD, DM, tonsil cancer s/p surgery + radiation in 2019 who presented to [facility] with acute SOB.  At the time, he denied chest pain, palpitations, diaphoresis. No F/C/NS/N/V/D. No cough, calf tenderness or increased LE edema.  Previous hospital course here at [facility] post MVR was complicated by right hemothorax requiring thoracocentesis (dry tap) and post op afib, went home on amio maintenance and BB, ultimately discharged home on 9/25/21.     In the ED at [facility], patient was initially hypertensive, although became hypotensive with SBP 90s s/p nitroglycerin. Levophed was started for pressor support.     Patient admitted to CICU;  TTE showed "Severe LV systolic fxn, EF 29%, large pericardial effusion posterior and lateral to the LV (2.5cm), the pericardial effusion measures 1.2cm adjacent to the RV, reduced diastolic expansion of the apical free wall of the RV, however diagnosis of tamponade limited by ". Findings on TTE were significant for pericardial tamponade. IR was consulted and a CT chest w/out contrast was completed. After quick review, IR stated it is not drainable.     Dr. Pena took the patient to the OR on 10/2/21 for a subxiphoid pericardial window, draining 500cc of fluid with relief of tamponade and placement of pericardial drain. Patient's HR was still aflutter 120s-130s, and an Amiodarone drip was initiated with improvement of HR to 60s. Patient was stable for transfer here today for continued management of his pericardial drain and evaluation by thoracic surgeons for possible complex decortication of right lung.  Currently he remains intubated and sedated.

## 2021-10-03 NOTE — DISCHARGE NOTE PROVIDER - HOSPITAL COURSE
70 yo Swedish/English speaking male, former smoker, presents w/ PMHx of severe mitral valve insufficiency (recent MV repair 9/20/21), atrial fibrillation, CHF (Lasix 20mg qd), CAD (s/p PCI 2018), HTN, HLD, DM, tonsil cancer s/p surgery + radiation in 2019 with shortness of breath. Onset two hours PTA. Was sitting during this time. Denies chest pain, palpitations, diaphoresis. No F/C/NS/N/V/D. No cough, calf tenderness or increased LE edema. MV repair hospital course was complicated by right hemothorax requiring thoracocentesis, discharged on 9/25/21.     In the ED, patient was initially hypertensive, although became hypotensive with SBP 90s s/p nitroglycerin. Levophed was started for pressor support.     Patient admitted to CICU initially overnight on 10/1 into 10/2. TTE was completed which demonstrated: "Severe LV systolic fxn, EF 29%, large pericardial effusion posterior and lateral to the LV (2.5cm), the pericardial effusion measures 1.2cm adjacent to the RV, reduced diastolic expansion of the apical free wall of the RV, however diagnosis of tamponade limited by ". Findings on TTE were significant for pericardial tamponade. IR was consulted and a CT chest w/out contrast was completed. After quick review, IR stated it is not drainable.     Dr. Pena took the patient to the OR on 10/2 for a subxiphoid pericardial window, draining 500cc of fluid with relief of tamponade and placement of pericardial drain. Patient's HR was still aflutter 120s-130s, and an Amiodarone drip was initiated with improvement of HR to 60s. Patient responds to all commands of all 4 extremities. Patient remains intubated and is stable for transfer to [facility] under Dr. Jarquin for planned Right decortication, exploration of right thorax.

## 2021-10-03 NOTE — H&P ADULT - NSICDXPASTMEDICALHX_GEN_ALL_CORE_FT
PAST MEDICAL HISTORY:  Atherosclerosis of coronary artery CAD (coronary artery disease)    Benign prostatic hypertrophy     DM (diabetes mellitus)     Essential hypertension Hypertension    HLD (hyperlipidemia)     HTN (hypertension)     Hyperlipidemia Hyperlipidemia    Pericardial effusion     Type 2 diabetes mellitus Diabetes

## 2021-10-03 NOTE — DISCHARGE NOTE NURSING/CASE MANAGEMENT/SOCIAL WORK - PATIENT PORTAL LINK FT
You can access the FollowMyHealth Patient Portal offered by [facility] by registering at the following website: [email or web address]. By joining [employer]’s FollowMyHealth portal, you will also be able to view your health information using other applications (apps) compatible with our system.

## 2021-10-03 NOTE — DISCHARGE NOTE PROVIDER - NSDCCRITICAL_ATTEST_NUM_GEN_ALL_CORE
minutes of critical care services.  This excludes any time spent on separate procedures or teaching.

## 2021-10-03 NOTE — PROGRESS NOTE ADULT - SUBJECTIVE AND OBJECTIVE BOX
CTICU  CRITICAL  CARE  attending     Hand off received 					   Pertinent clinical, laboratory, radiographic, hemodynamic, echocardiographic, respiratory data, microbiologic data and chart were reviewed and analyzed frequently throughout the course of the day and night  Patient seen and examined with CTS/ SH attending at bedside    Pt is a 71y , Male, with hx of mitral regurgitation; s/p min invasive MV repair on ; d/cd home on   presented to OSH with large pericardial effusion ;  s/p pericardial window and drainage    remains intubated;    transferred for possible R VATS; evacuation of hemothorax and decortication    intubated on full vent support  on propofol  PAP: 20's /low 10's   CVP 5-8  CI> 2.2    on amiodarone infusion from OSH where pt had afib/RVR    volume resuscitated  feeding tube placed and enteral feeds started    CT chest from OSH reviewed ;    HPI:    70 yo Maori/English speaking male, former smoker w/ PMHx of severe mitral valve insufficiency (recent MV repair 21), atrial fibrillation, HFrEF (30%), CAD (s/p PCI ), HTN, HLD, DM, tonsil cancer s/p surgery + radiation in  who presented to [facility] with acute SOB.  At the time, he denied chest pain, palpitations, diaphoresis. No F/C/NS/N/V/D. No cough, calf tenderness or increased LE edema.  Previous hospital course here at [facility] post MVR was complicated by right hemothorax requiring thoracocentesis (dry tap) and post op afib, went home on amio maintenance and BB, ultimately discharged home on 21.     In the ED at [facility], patient was initially hypertensive, although became hypotensive with SBP 90s s/p nitroglycerin. Levophed was started for pressor support.     Patient admitted to CICU;  TTE showed "Severe LV systolic fxn, EF 29%, large pericardial effusion posterior and lateral to the LV (2.5cm), the pericardial effusion measures 1.2cm adjacent to the RV, reduced diastolic expansion of the apical free wall of the RV, however diagnosis of tamponade limited by ". Findings on TTE were significant for pericardial tamponade. IR was consulted and a CT chest w/out contrast was completed. After quick review, IR stated it is not drainable.     Dr. Pena took the patient to the OR on 10/2/21 for a subxiphoid pericardial window, draining 500cc of fluid with relief of tamponade and placement of pericardial drain. Patient's HR was still aflutter 120s-130s, and an Amiodarone drip was initiated with improvement of HR to 60s. Patient was stable for transfer here today for continued management of his pericardial drain and evaluation by thoracic surgeons for possible complex decortication of right lung.  Currently he remains intubated and sedated.          FAMILY HISTORY:  Family history of stroke (Sibling)    Family history of cerebrovascular accident (CVA) (Sibling, Mother)    Family history of acute myocardial infarction (Sibling)    PAST MEDICAL & SURGICAL HISTORY:  Benign prostatic hypertrophy    HTN (hypertension)    HLD (hyperlipidemia)    DM (diabetes mellitus)    Atherosclerosis of coronary artery  CAD (coronary artery disease)    Type 2 diabetes mellitus  Diabetes    Hyperlipidemia  Hyperlipidemia    Essential hypertension  Hypertension    Pericardial effusion    H/O mitral valve repair    S/P pericardial window creation      Patient is a 71y old  Male who presents with a chief complaint of loculated right sided pleural effusion/hemothorax(03 Oct 2021 12:02)      14 system review unable to obtain  acute changes include acute respiratory failure  Vital signs, hemodynamic and respiratory parameters were reviewed from the bedside nursing flowsheet.  ICU Vital Signs Last 24 Hrs  T(C): 36.3 (03 Oct 2021 12:30), Max: 36.9 (03 Oct 2021 04:00)  T(F): 97.3 (03 Oct 2021 12:30), Max: 98.4 (03 Oct 2021 04:00)  HR: 57 (03 Oct 2021 22:34) (57 - 69)  BP: 110/72 (03 Oct 2021 12:30) (110/72 - 110/72)  BP(mean): 86 (03 Oct 2021 12:30) (86 - 86)  ABP: 118/65 (03 Oct 2021 22:00) (104/59 - 147/71)  ABP(mean): 81 (03 Oct 2021 22:00) (73 - 97)  RR: 16 (03 Oct 2021 22:34) (15 - 18)  SpO2: 100% (03 Oct 2021 22:34) (96% - 100%)    Adult Advanced Hemodynamics Last 24 Hrs  CVP(mm Hg): 8 (03 Oct 2021 22:00) (5 - 14)  CVP(cm H2O): --  CO: 4 (03 Oct 2021 22:00) (3.6 - 5)  CI: 2 (03 Oct 2021 22:00) (1.8 - 2.7)  PA: / (03 Oct 2021 22:00) ( - 42/20)  PA(mean): 19 (03 Oct 2021 22:00) (18 - 32)  PCWP: --  SVR: 1458 (03 Oct 2021 22:00) (1230 - 1823)  SVRI: 2916 (03 Oct 2021 22:00) (2385 - 3742)  PVR: --  PVRI: --, ABG - ( 03 Oct 2021 14:09 )  pH, Arterial: 7.43  pH, Blood: x     /  pCO2: 35    /  pO2: 116   / HCO3: 23    / Base Excess: -0.6  /  SaO2: 98.3              Mode: AC/ CMV (Assist Control/ Continuous Mandatory Ventilation)  RR (machine): 16  TV (machine): 500  FiO2: 50  PEEP: 7  ITime: 1  MAP: 12  PIP: 25    Intake and output was reviewed and the fluid balance was calculated  Daily     Daily Weight in k.6 (03 Oct 2021 11:55)  I&O's Summary    03 Oct 2021 07:01  -  03 Oct 2021 22:54  --------------------------------------------------------  IN: 1099.5 mL / OUT: 422 mL / NET: 677.5 mL        All lines and drain sites were assessed  Glycemic trend was reviewedCAPILLARY BLOOD GLUCOSE  127 (03 Oct 2021 10:00)      POCT Blood Glucose.: 132 mg/dL (03 Oct 2021 19:36)    No acute change in mental status  Auscultation of the chest reveals equal bs  Abdomen is soft  Extremities are warm and well perfused  Wounds appear clean and unremarkable  Antibiotics are periop    labs  CBC Full  -  ( 03 Oct 2021 12:51 )  WBC Count : 9.95 K/uL  RBC Count : 3.18 M/uL  Hemoglobin : 9.2 g/dL  Hematocrit : 28.4 %  Platelet Count - Automated : 287 K/uL  Mean Cell Volume : 89.3 fl  Mean Cell Hemoglobin : 28.9 pg  Mean Cell Hemoglobin Concentration : 32.4 gm/dL  Auto Neutrophil # : 8.38 K/uL  Auto Lymphocyte # : 0.57 K/uL  Auto Monocyte # : 0.78 K/uL  Auto Eosinophil # : 0.10 K/uL  Auto Basophil # : 0.04 K/uL  Auto Neutrophil % : 84.3 %  Auto Lymphocyte % : 5.7 %  Auto Monocyte % : 7.8 %  Auto Eosinophil % : 1.0 %  Auto Basophil % : 0.4 %    10-03    137  |  105  |  20  ----------------------------<  135<H>  4.4   |  24  |  1.02    Ca    8.4      03 Oct 2021 12:51  Phos  3.7     10  Mg     2.5     10-03    TPro  5.5<L>  /  Alb  3.1<L>  /  TBili  0.7  /  DBili  x   /  AST  24  /  ALT  24  /  AlkPhos  96  10    PT/INR - ( 03 Oct 2021 12:51 )   PT: 15.7 sec;   INR: 1.33          PTT - ( 03 Oct 2021 12:51 )  PTT:29.1 sec  The current medications were reviewed   MEDICATIONS  (STANDING):  acetaminophen  IVPB .. 1000 milliGRAM(s) IV Intermittent once  aMIOdarone Infusion 0.25 mG/Min (8.33 mL/Hr) IV Continuous <Continuous>  aspirin enteric coated 81 milliGRAM(s) Oral daily  atorvastatin 40 milliGRAM(s) Oral at bedtime  chlorhexidine 0.12% Liquid 15 milliLiter(s) Oral Mucosa every 12 hours  dexMEDEtomidine Infusion 0.4 MICROgram(s)/kG/Hr (8.96 mL/Hr) IV Continuous <Continuous>  heparin   Injectable 5000 Unit(s) SubCutaneous every 8 hours  lactated ringers. 1000 milliLiter(s) (50 mL/Hr) IV Continuous <Continuous>  metoprolol tartrate Injectable 2.5 milliGRAM(s) IV Push every 6 hours  pantoprazole  Injectable 40 milliGRAM(s) IV Push daily  propofol Infusion 5 MICROgram(s)/kG/Min (2.69 mL/Hr) IV Continuous <Continuous>  sodium chloride 0.9% lock flush 3 milliLiter(s) IV Push every 8 hours    MEDICATIONS  (PRN):  fentaNYL    Injectable 25 MICROGram(s) IV Push every 3 hours PRN Severe Pain (7 - 10)       PROBLEM LIST/ ASSESSMENT:  HEALTH ISSUES - PROBLEM Dx:      ,   Patient is a 71y old  Male who presents with a chief complaint of loculated right sided pleural effusion/hemothorax(03 Oct 2021 12:02)     s/p pericardial window and drainage of pericardial effusion  acute changes include acute respiratory failure    My plan includes :    CV:    Maintain MAP >65-70  Maintain CI > 2.2  volume resuscitate to maintain CVP >10  c/w amiodarone infusion    Pulm:    continue full Vent support  titrate Fio2 to maintain Sao2 >95  Serial ABGs  CXR in the am  for possible R VATS; robotic assisted decortication and evac of hemothorax tomorrow    FEN:    enteral feeds via Dobhoff  hold feeds @ 3a for OR    close hemodynamic, ventilatory and drain monitoring and management per post op routine    Monitor for arrhythmias and monitor parameters for organ perfusion  monitor neurologic status  Head of the bed should remain elevated to 45 deg .   chest PT and IS will be encouraged  monitor adequacy of oxygenation and ventilation and attempt to wean oxygen  Nutritional goals will be met using po eventually , ensure adequate caloric intake and montior the same  Stress ulcer and VTE prophylaxis will be achieved    Glycemic control is satisfactory  Electrolytes have been repleted as necessary and wound care has been carried out. Pain control has been achieved.   agressive physical therapy and early mobility and ambulation goals will be met   The family was updated about the course and plan  CRITICAL CARE TIME SPENT in evaluation and management, reassessments, review and interpretation of labs and x-rays, ventilator and hemodynamic management, formulating a plan and coordinating care: ___90____ MIN.  Time does not include procedural time.  CTICU ATTENDING     					    Waqas Long MD

## 2021-10-04 ENCOUNTER — RESULT REVIEW (OUTPATIENT)
Age: 71
End: 2021-10-04

## 2021-10-04 ENCOUNTER — APPOINTMENT (OUTPATIENT)
Dept: THORACIC SURGERY | Facility: HOSPITAL | Age: 71
End: 2021-10-04

## 2021-10-04 LAB
ALBUMIN SERPL ELPH-MCNC: 3.2 G/DL — LOW (ref 3.3–5)
ALBUMIN SERPL ELPH-MCNC: 3.5 G/DL — SIGNIFICANT CHANGE UP (ref 3.3–5)
ALBUMIN SERPL ELPH-MCNC: 3.7 G/DL — SIGNIFICANT CHANGE UP (ref 3.3–5)
ALBUMIN SERPL ELPH-MCNC: 3.8 G/DL — SIGNIFICANT CHANGE UP (ref 3.3–5)
ALP SERPL-CCNC: 102 U/L — SIGNIFICANT CHANGE UP (ref 40–120)
ALP SERPL-CCNC: 173 U/L — HIGH (ref 40–120)
ALP SERPL-CCNC: 175 U/L — HIGH (ref 40–120)
ALP SERPL-CCNC: 95 U/L — SIGNIFICANT CHANGE UP (ref 40–120)
ALT FLD-CCNC: 21 U/L — SIGNIFICANT CHANGE UP (ref 10–45)
ALT FLD-CCNC: 22 U/L — SIGNIFICANT CHANGE UP (ref 10–45)
ALT FLD-CCNC: 33 U/L — SIGNIFICANT CHANGE UP (ref 10–45)
ALT FLD-CCNC: 50 U/L — HIGH (ref 10–45)
ANION GAP SERPL CALC-SCNC: 11 MMOL/L — SIGNIFICANT CHANGE UP (ref 5–17)
ANION GAP SERPL CALC-SCNC: 8 MMOL/L — SIGNIFICANT CHANGE UP (ref 5–17)
ANION GAP SERPL CALC-SCNC: 8 MMOL/L — SIGNIFICANT CHANGE UP (ref 5–17)
ANION GAP SERPL CALC-SCNC: 9 MMOL/L — SIGNIFICANT CHANGE UP (ref 5–17)
APTT BLD: 28.7 SEC — SIGNIFICANT CHANGE UP (ref 27.5–35.5)
APTT BLD: 29.5 SEC — SIGNIFICANT CHANGE UP (ref 27.5–35.5)
APTT BLD: 29.6 SEC — SIGNIFICANT CHANGE UP (ref 27.5–35.5)
APTT BLD: 30.3 SEC — SIGNIFICANT CHANGE UP (ref 27.5–35.5)
AST SERPL-CCNC: 21 U/L — SIGNIFICANT CHANGE UP (ref 10–40)
AST SERPL-CCNC: 22 U/L — SIGNIFICANT CHANGE UP (ref 10–40)
AST SERPL-CCNC: 44 U/L — HIGH (ref 10–40)
AST SERPL-CCNC: 56 U/L — HIGH (ref 10–40)
BASE EXCESS BLDA CALC-SCNC: -3 MMOL/L — LOW (ref -2–3)
BASE EXCESS BLDV CALC-SCNC: -0.5 MMOL/L — SIGNIFICANT CHANGE UP (ref -2–3)
BASOPHILS # BLD AUTO: 0.04 K/UL — SIGNIFICANT CHANGE UP (ref 0–0.2)
BASOPHILS NFR BLD AUTO: 0.3 % — SIGNIFICANT CHANGE UP (ref 0–2)
BILIRUB SERPL-MCNC: 0.6 MG/DL — SIGNIFICANT CHANGE UP (ref 0.2–1.2)
BILIRUB SERPL-MCNC: 0.7 MG/DL — SIGNIFICANT CHANGE UP (ref 0.2–1.2)
BILIRUB SERPL-MCNC: 1.6 MG/DL — HIGH (ref 0.2–1.2)
BILIRUB SERPL-MCNC: 1.7 MG/DL — HIGH (ref 0.2–1.2)
BUN SERPL-MCNC: 15 MG/DL — SIGNIFICANT CHANGE UP (ref 7–23)
BUN SERPL-MCNC: 16 MG/DL — SIGNIFICANT CHANGE UP (ref 7–23)
BUN SERPL-MCNC: 17 MG/DL — SIGNIFICANT CHANGE UP (ref 7–23)
BUN SERPL-MCNC: 20 MG/DL — SIGNIFICANT CHANGE UP (ref 7–23)
CA-I BLDA-SCNC: 1.44 MMOL/L — HIGH (ref 1.15–1.33)
CALCIUM SERPL-MCNC: 8 MG/DL — LOW (ref 8.4–10.5)
CALCIUM SERPL-MCNC: 8.4 MG/DL — SIGNIFICANT CHANGE UP (ref 8.4–10.5)
CALCIUM SERPL-MCNC: 8.5 MG/DL — SIGNIFICANT CHANGE UP (ref 8.4–10.5)
CALCIUM SERPL-MCNC: 8.6 MG/DL — SIGNIFICANT CHANGE UP (ref 8.4–10.5)
CHLORIDE SERPL-SCNC: 108 MMOL/L — SIGNIFICANT CHANGE UP (ref 96–108)
CHLORIDE SERPL-SCNC: 109 MMOL/L — HIGH (ref 96–108)
CO2 BLDA-SCNC: 25 MMOL/L — HIGH (ref 19–24)
CO2 BLDV-SCNC: 25.4 MMOL/L — SIGNIFICANT CHANGE UP (ref 22–26)
CO2 SERPL-SCNC: 21 MMOL/L — LOW (ref 22–31)
CO2 SERPL-SCNC: 22 MMOL/L — SIGNIFICANT CHANGE UP (ref 22–31)
CO2 SERPL-SCNC: 24 MMOL/L — SIGNIFICANT CHANGE UP (ref 22–31)
CO2 SERPL-SCNC: 24 MMOL/L — SIGNIFICANT CHANGE UP (ref 22–31)
COHGB MFR BLDA: 0.9 % — SIGNIFICANT CHANGE UP
COVID-19 SPIKE DOMAIN AB INTERP: POSITIVE
COVID-19 SPIKE DOMAIN ANTIBODY RESULT: >250 U/ML — HIGH
CREAT SERPL-MCNC: 0.91 MG/DL — SIGNIFICANT CHANGE UP (ref 0.5–1.3)
CREAT SERPL-MCNC: 0.92 MG/DL — SIGNIFICANT CHANGE UP (ref 0.5–1.3)
CREAT SERPL-MCNC: 0.93 MG/DL — SIGNIFICANT CHANGE UP (ref 0.5–1.3)
CREAT SERPL-MCNC: 1.03 MG/DL — SIGNIFICANT CHANGE UP (ref 0.5–1.3)
EOSINOPHIL # BLD AUTO: 0.04 K/UL — SIGNIFICANT CHANGE UP (ref 0–0.5)
EOSINOPHIL NFR BLD AUTO: 0.3 % — SIGNIFICANT CHANGE UP (ref 0–6)
GAS PNL BLDA: SIGNIFICANT CHANGE UP
GLUCOSE BLDC GLUCOMTR-MCNC: 118 MG/DL — HIGH (ref 70–99)
GLUCOSE BLDC GLUCOMTR-MCNC: 145 MG/DL — HIGH (ref 70–99)
GLUCOSE BLDC GLUCOMTR-MCNC: 98 MG/DL — SIGNIFICANT CHANGE UP (ref 70–99)
GLUCOSE SERPL-MCNC: 112 MG/DL — HIGH (ref 70–99)
GLUCOSE SERPL-MCNC: 149 MG/DL — HIGH (ref 70–99)
GLUCOSE SERPL-MCNC: 156 MG/DL — HIGH (ref 70–99)
GLUCOSE SERPL-MCNC: 206 MG/DL — HIGH (ref 70–99)
HCO3 BLDA-SCNC: 23 MMOL/L — SIGNIFICANT CHANGE UP (ref 21–28)
HCO3 BLDV-SCNC: 24 MMOL/L — SIGNIFICANT CHANGE UP (ref 22–29)
HCT VFR BLD CALC: 25.8 % — LOW (ref 39–50)
HCT VFR BLD CALC: 29.7 % — LOW (ref 39–50)
HCT VFR BLD CALC: 33.6 % — LOW (ref 39–50)
HCT VFR BLD CALC: 34.1 % — LOW (ref 39–50)
HGB BLD-MCNC: 10.5 G/DL — LOW (ref 13–17)
HGB BLD-MCNC: 11 G/DL — LOW (ref 13–17)
HGB BLD-MCNC: 8.6 G/DL — LOW (ref 13–17)
HGB BLD-MCNC: 9.5 G/DL — LOW (ref 13–17)
HGB BLDA-MCNC: 8.9 G/DL — LOW (ref 12.6–17.4)
IMM GRANULOCYTES NFR BLD AUTO: 0.9 % — SIGNIFICANT CHANGE UP (ref 0–1.5)
INR BLD: 1.19 — HIGH (ref 0.88–1.16)
INR BLD: 1.19 — HIGH (ref 0.88–1.16)
INR BLD: 1.25 — HIGH (ref 0.88–1.16)
INR BLD: 1.31 — HIGH (ref 0.88–1.16)
LYMPHOCYTES # BLD AUTO: 0.84 K/UL — LOW (ref 1–3.3)
LYMPHOCYTES # BLD AUTO: 5.6 % — LOW (ref 13–44)
MAGNESIUM SERPL-MCNC: 1.9 MG/DL — SIGNIFICANT CHANGE UP (ref 1.6–2.6)
MAGNESIUM SERPL-MCNC: 2 MG/DL — SIGNIFICANT CHANGE UP (ref 1.6–2.6)
MAGNESIUM SERPL-MCNC: 2.1 MG/DL — SIGNIFICANT CHANGE UP (ref 1.6–2.6)
MAGNESIUM SERPL-MCNC: 2.1 MG/DL — SIGNIFICANT CHANGE UP (ref 1.6–2.6)
MCHC RBC-ENTMCNC: 28.5 PG — SIGNIFICANT CHANGE UP (ref 27–34)
MCHC RBC-ENTMCNC: 28.6 PG — SIGNIFICANT CHANGE UP (ref 27–34)
MCHC RBC-ENTMCNC: 29.5 PG — SIGNIFICANT CHANGE UP (ref 27–34)
MCHC RBC-ENTMCNC: 29.8 PG — SIGNIFICANT CHANGE UP (ref 27–34)
MCHC RBC-ENTMCNC: 31.3 GM/DL — LOW (ref 32–36)
MCHC RBC-ENTMCNC: 32 GM/DL — SIGNIFICANT CHANGE UP (ref 32–36)
MCHC RBC-ENTMCNC: 32.3 GM/DL — SIGNIFICANT CHANGE UP (ref 32–36)
MCHC RBC-ENTMCNC: 33.3 GM/DL — SIGNIFICANT CHANGE UP (ref 32–36)
MCV RBC AUTO: 89.3 FL — SIGNIFICANT CHANGE UP (ref 80–100)
MCV RBC AUTO: 89.5 FL — SIGNIFICANT CHANGE UP (ref 80–100)
MCV RBC AUTO: 91.1 FL — SIGNIFICANT CHANGE UP (ref 80–100)
MCV RBC AUTO: 91.4 FL — SIGNIFICANT CHANGE UP (ref 80–100)
METHGB MFR BLDA: 1 % — SIGNIFICANT CHANGE UP
MONOCYTES # BLD AUTO: 1.24 K/UL — HIGH (ref 0–0.9)
MONOCYTES NFR BLD AUTO: 8.3 % — SIGNIFICANT CHANGE UP (ref 2–14)
NEUTROPHILS # BLD AUTO: 12.6 K/UL — HIGH (ref 1.8–7.4)
NEUTROPHILS NFR BLD AUTO: 84.6 % — HIGH (ref 43–77)
NRBC # BLD: 0 /100 WBCS — SIGNIFICANT CHANGE UP (ref 0–0)
OXYHGB MFR BLDA: 96 % — HIGH (ref 90–95)
PCO2 BLDA: 46 MMHG — SIGNIFICANT CHANGE UP (ref 35–48)
PCO2 BLDV: 39 MMHG — LOW (ref 42–55)
PH BLDA: 7.31 — LOW (ref 7.35–7.45)
PH BLDV: 7.4 — SIGNIFICANT CHANGE UP (ref 7.32–7.43)
PHOSPHATE SERPL-MCNC: 3 MG/DL — SIGNIFICANT CHANGE UP (ref 2.5–4.5)
PHOSPHATE SERPL-MCNC: 4.5 MG/DL — SIGNIFICANT CHANGE UP (ref 2.5–4.5)
PLATELET # BLD AUTO: 259 K/UL — SIGNIFICANT CHANGE UP (ref 150–400)
PLATELET # BLD AUTO: 266 K/UL — SIGNIFICANT CHANGE UP (ref 150–400)
PLATELET # BLD AUTO: 373 K/UL — SIGNIFICANT CHANGE UP (ref 150–400)
PLATELET # BLD AUTO: 378 K/UL — SIGNIFICANT CHANGE UP (ref 150–400)
PO2 BLDA: 116 MMHG — HIGH (ref 83–108)
PO2 BLDV: 42 MMHG — SIGNIFICANT CHANGE UP (ref 25–45)
POTASSIUM BLDA-SCNC: 4.2 MMOL/L — SIGNIFICANT CHANGE UP (ref 3.5–5.1)
POTASSIUM SERPL-MCNC: 3.9 MMOL/L — SIGNIFICANT CHANGE UP (ref 3.5–5.3)
POTASSIUM SERPL-MCNC: 4 MMOL/L — SIGNIFICANT CHANGE UP (ref 3.5–5.3)
POTASSIUM SERPL-MCNC: 4.5 MMOL/L — SIGNIFICANT CHANGE UP (ref 3.5–5.3)
POTASSIUM SERPL-MCNC: 4.6 MMOL/L — SIGNIFICANT CHANGE UP (ref 3.5–5.3)
POTASSIUM SERPL-SCNC: 3.9 MMOL/L — SIGNIFICANT CHANGE UP (ref 3.5–5.3)
POTASSIUM SERPL-SCNC: 4 MMOL/L — SIGNIFICANT CHANGE UP (ref 3.5–5.3)
POTASSIUM SERPL-SCNC: 4.5 MMOL/L — SIGNIFICANT CHANGE UP (ref 3.5–5.3)
POTASSIUM SERPL-SCNC: 4.6 MMOL/L — SIGNIFICANT CHANGE UP (ref 3.5–5.3)
PROT SERPL-MCNC: 5.2 G/DL — LOW (ref 6–8.3)
PROT SERPL-MCNC: 5.6 G/DL — LOW (ref 6–8.3)
PROT SERPL-MCNC: 6 G/DL — SIGNIFICANT CHANGE UP (ref 6–8.3)
PROT SERPL-MCNC: 6.1 G/DL — SIGNIFICANT CHANGE UP (ref 6–8.3)
PROTHROM AB SERPL-ACNC: 14.2 SEC — HIGH (ref 10.6–13.6)
PROTHROM AB SERPL-ACNC: 14.2 SEC — HIGH (ref 10.6–13.6)
PROTHROM AB SERPL-ACNC: 14.8 SEC — HIGH (ref 10.6–13.6)
PROTHROM AB SERPL-ACNC: 15.5 SEC — HIGH (ref 10.6–13.6)
RBC # BLD: 2.89 M/UL — LOW (ref 4.2–5.8)
RBC # BLD: 3.32 M/UL — LOW (ref 4.2–5.8)
RBC # BLD: 3.69 M/UL — LOW (ref 4.2–5.8)
RBC # BLD: 3.73 M/UL — LOW (ref 4.2–5.8)
RBC # FLD: 13.7 % — SIGNIFICANT CHANGE UP (ref 10.3–14.5)
RBC # FLD: 13.9 % — SIGNIFICANT CHANGE UP (ref 10.3–14.5)
RBC # FLD: 14.2 % — SIGNIFICANT CHANGE UP (ref 10.3–14.5)
RBC # FLD: 14.5 % — SIGNIFICANT CHANGE UP (ref 10.3–14.5)
SAO2 % BLDA: 97.9 % — SIGNIFICANT CHANGE UP (ref 94–98)
SAO2 % BLDV: 74.3 % — SIGNIFICANT CHANGE UP (ref 67–88)
SARS-COV-2 IGG+IGM SERPL QL IA: >250 U/ML — HIGH
SARS-COV-2 IGG+IGM SERPL QL IA: POSITIVE
SODIUM BLDA-SCNC: 138 MMOL/L — SIGNIFICANT CHANGE UP (ref 136–145)
SODIUM SERPL-SCNC: 139 MMOL/L — SIGNIFICANT CHANGE UP (ref 135–145)
SODIUM SERPL-SCNC: 140 MMOL/L — SIGNIFICANT CHANGE UP (ref 135–145)
SODIUM SERPL-SCNC: 140 MMOL/L — SIGNIFICANT CHANGE UP (ref 135–145)
SODIUM SERPL-SCNC: 141 MMOL/L — SIGNIFICANT CHANGE UP (ref 135–145)
WBC # BLD: 14.9 K/UL — HIGH (ref 3.8–10.5)
WBC # BLD: 16.38 K/UL — HIGH (ref 3.8–10.5)
WBC # BLD: 7.15 K/UL — SIGNIFICANT CHANGE UP (ref 3.8–10.5)
WBC # BLD: 7.81 K/UL — SIGNIFICANT CHANGE UP (ref 3.8–10.5)
WBC # FLD AUTO: 14.9 K/UL — HIGH (ref 3.8–10.5)
WBC # FLD AUTO: 16.38 K/UL — HIGH (ref 3.8–10.5)
WBC # FLD AUTO: 7.15 K/UL — SIGNIFICANT CHANGE UP (ref 3.8–10.5)
WBC # FLD AUTO: 7.81 K/UL — SIGNIFICANT CHANGE UP (ref 3.8–10.5)

## 2021-10-04 PROCEDURE — 93005 ELECTROCARDIOGRAM TRACING: CPT

## 2021-10-04 PROCEDURE — 32652 THORACOSCOPY REM TOTL CORTEX: CPT | Mod: 79

## 2021-10-04 PROCEDURE — 85730 THROMBOPLASTIN TIME PARTIAL: CPT

## 2021-10-04 PROCEDURE — 85610 PROTHROMBIN TIME: CPT

## 2021-10-04 PROCEDURE — 82550 ASSAY OF CK (CPK): CPT

## 2021-10-04 PROCEDURE — 82330 ASSAY OF CALCIUM: CPT

## 2021-10-04 PROCEDURE — 82962 GLUCOSE BLOOD TEST: CPT

## 2021-10-04 PROCEDURE — 84295 ASSAY OF SERUM SODIUM: CPT

## 2021-10-04 PROCEDURE — C1894: CPT

## 2021-10-04 PROCEDURE — 82553 CREATINE MB FRACTION: CPT

## 2021-10-04 PROCEDURE — 71045 X-RAY EXAM CHEST 1 VIEW: CPT | Mod: 26

## 2021-10-04 PROCEDURE — S2900 ROBOTIC SURGICAL SYSTEM: CPT | Mod: NC

## 2021-10-04 PROCEDURE — C1889: CPT

## 2021-10-04 PROCEDURE — 99152 MOD SED SAME PHYS/QHP 5/>YRS: CPT

## 2021-10-04 PROCEDURE — 31622 DX BRONCHOSCOPE/WASH: CPT | Mod: 79

## 2021-10-04 PROCEDURE — 85025 COMPLETE CBC W/AUTO DIFF WBC: CPT

## 2021-10-04 PROCEDURE — S2900: CPT

## 2021-10-04 PROCEDURE — C1769: CPT

## 2021-10-04 PROCEDURE — 80053 COMPREHEN METABOLIC PANEL: CPT

## 2021-10-04 PROCEDURE — 99291 CRITICAL CARE FIRST HOUR: CPT

## 2021-10-04 PROCEDURE — 83036 HEMOGLOBIN GLYCOSYLATED A1C: CPT

## 2021-10-04 PROCEDURE — 85018 HEMOGLOBIN: CPT

## 2021-10-04 PROCEDURE — 99292 CRITICAL CARE ADDL 30 MIN: CPT

## 2021-10-04 PROCEDURE — 88304 TISSUE EXAM BY PATHOLOGIST: CPT | Mod: 26

## 2021-10-04 PROCEDURE — 93454 CORONARY ARTERY ANGIO S&I: CPT

## 2021-10-04 PROCEDURE — 84132 ASSAY OF SERUM POTASSIUM: CPT

## 2021-10-04 PROCEDURE — C1887: CPT

## 2021-10-04 PROCEDURE — 80061 LIPID PANEL: CPT

## 2021-10-04 PROCEDURE — 32652 THORACOSCOPY REM TOTL CORTEX: CPT | Mod: 80,79

## 2021-10-04 PROCEDURE — 93312 ECHO TRANSESOPHAGEAL: CPT

## 2021-10-04 PROCEDURE — 88307 TISSUE EXAM BY PATHOLOGIST: CPT | Mod: 26

## 2021-10-04 RX ORDER — METOPROLOL TARTRATE 50 MG
5 TABLET ORAL ONCE
Refills: 0 | Status: COMPLETED | OUTPATIENT
Start: 2021-10-04 | End: 2021-10-04

## 2021-10-04 RX ORDER — AMIODARONE HYDROCHLORIDE 400 MG/1
150 TABLET ORAL ONCE
Refills: 0 | Status: COMPLETED | OUTPATIENT
Start: 2021-10-04 | End: 2021-10-04

## 2021-10-04 RX ORDER — ASPIRIN/CALCIUM CARB/MAGNESIUM 324 MG
81 TABLET ORAL DAILY
Refills: 0 | Status: DISCONTINUED | OUTPATIENT
Start: 2021-10-04 | End: 2021-10-11

## 2021-10-04 RX ORDER — CHLORHEXIDINE GLUCONATE 213 G/1000ML
1 SOLUTION TOPICAL
Refills: 0 | Status: DISCONTINUED | OUTPATIENT
Start: 2021-10-04 | End: 2021-10-11

## 2021-10-04 RX ORDER — BUPIVACAINE 13.3 MG/ML
20 INJECTION, SUSPENSION, LIPOSOMAL INFILTRATION ONCE
Refills: 0 | Status: DISCONTINUED | OUTPATIENT
Start: 2021-10-04 | End: 2021-10-04

## 2021-10-04 RX ORDER — ATORVASTATIN CALCIUM 80 MG/1
40 TABLET, FILM COATED ORAL AT BEDTIME
Refills: 0 | Status: DISCONTINUED | OUTPATIENT
Start: 2021-10-04 | End: 2021-10-05

## 2021-10-04 RX ORDER — MAGNESIUM SULFATE 500 MG/ML
2 VIAL (ML) INJECTION ONCE
Refills: 0 | Status: COMPLETED | OUTPATIENT
Start: 2021-10-04 | End: 2021-10-04

## 2021-10-04 RX ORDER — AMIODARONE HYDROCHLORIDE 400 MG/1
150 TABLET ORAL ONCE
Refills: 0 | Status: DISCONTINUED | OUTPATIENT
Start: 2021-10-04 | End: 2021-10-04

## 2021-10-04 RX ORDER — HEPARIN SODIUM 5000 [USP'U]/ML
5000 INJECTION INTRAVENOUS; SUBCUTANEOUS EVERY 8 HOURS
Refills: 0 | Status: DISCONTINUED | OUTPATIENT
Start: 2021-10-04 | End: 2021-10-07

## 2021-10-04 RX ORDER — POTASSIUM CHLORIDE 20 MEQ
20 PACKET (EA) ORAL ONCE
Refills: 0 | Status: COMPLETED | OUTPATIENT
Start: 2021-10-04 | End: 2021-10-04

## 2021-10-04 RX ORDER — SODIUM CHLORIDE 9 MG/ML
1000 INJECTION INTRAMUSCULAR; INTRAVENOUS; SUBCUTANEOUS
Refills: 0 | Status: DISCONTINUED | OUTPATIENT
Start: 2021-10-04 | End: 2021-10-11

## 2021-10-04 RX ORDER — ALBUMIN HUMAN 25 %
250 VIAL (ML) INTRAVENOUS ONCE
Refills: 0 | Status: DISCONTINUED | OUTPATIENT
Start: 2021-10-04 | End: 2021-10-04

## 2021-10-04 RX ORDER — SODIUM CHLORIDE 9 MG/ML
1000 INJECTION, SOLUTION INTRAVENOUS
Refills: 0 | Status: DISCONTINUED | OUTPATIENT
Start: 2021-10-04 | End: 2021-10-05

## 2021-10-04 RX ORDER — PANTOPRAZOLE SODIUM 20 MG/1
40 TABLET, DELAYED RELEASE ORAL DAILY
Refills: 0 | Status: DISCONTINUED | OUTPATIENT
Start: 2021-10-04 | End: 2021-10-08

## 2021-10-04 RX ORDER — DILTIAZEM HCL 120 MG
5 CAPSULE, EXT RELEASE 24 HR ORAL ONCE
Refills: 0 | Status: COMPLETED | OUTPATIENT
Start: 2021-10-04 | End: 2021-10-04

## 2021-10-04 RX ORDER — FENTANYL CITRATE 50 UG/ML
50 INJECTION INTRAVENOUS ONCE
Refills: 0 | Status: DISCONTINUED | OUTPATIENT
Start: 2021-10-04 | End: 2021-10-04

## 2021-10-04 RX ORDER — ALBUMIN HUMAN 25 %
250 VIAL (ML) INTRAVENOUS ONCE
Refills: 0 | Status: COMPLETED | OUTPATIENT
Start: 2021-10-04 | End: 2021-10-04

## 2021-10-04 RX ADMIN — Medication 100 MILLIEQUIVALENT(S): at 04:54

## 2021-10-04 RX ADMIN — PROPOFOL 2.69 MICROGRAM(S)/KG/MIN: 10 INJECTION, EMULSION INTRAVENOUS at 06:18

## 2021-10-04 RX ADMIN — CHLORHEXIDINE GLUCONATE 1 APPLICATION(S): 213 SOLUTION TOPICAL at 04:55

## 2021-10-04 RX ADMIN — FENTANYL CITRATE 50 MICROGRAM(S): 50 INJECTION INTRAVENOUS at 15:00

## 2021-10-04 RX ADMIN — PROPOFOL 2.69 MICROGRAM(S)/KG/MIN: 10 INJECTION, EMULSION INTRAVENOUS at 22:01

## 2021-10-04 RX ADMIN — Medication 81 MILLIGRAM(S): at 11:07

## 2021-10-04 RX ADMIN — PANTOPRAZOLE SODIUM 40 MILLIGRAM(S): 20 TABLET, DELAYED RELEASE ORAL at 11:07

## 2021-10-04 RX ADMIN — Medication 2.5 MILLIGRAM(S): at 11:07

## 2021-10-04 RX ADMIN — Medication 250 MILLILITER(S): at 00:47

## 2021-10-04 RX ADMIN — Medication 5 MILLIGRAM(S): at 14:35

## 2021-10-04 RX ADMIN — Medication 5 MILLIGRAM(S): at 21:38

## 2021-10-04 RX ADMIN — FENTANYL CITRATE 50 MICROGRAM(S): 50 INJECTION INTRAVENOUS at 13:40

## 2021-10-04 RX ADMIN — HEPARIN SODIUM 5000 UNIT(S): 5000 INJECTION INTRAVENOUS; SUBCUTANEOUS at 05:51

## 2021-10-04 RX ADMIN — PROPOFOL 2.69 MICROGRAM(S)/KG/MIN: 10 INJECTION, EMULSION INTRAVENOUS at 00:13

## 2021-10-04 RX ADMIN — Medication 50 GRAM(S): at 23:32

## 2021-10-04 RX ADMIN — SODIUM CHLORIDE 3 MILLILITER(S): 9 INJECTION INTRAMUSCULAR; INTRAVENOUS; SUBCUTANEOUS at 04:55

## 2021-10-04 RX ADMIN — PHENYLEPHRINE HYDROCHLORIDE 3.36 MICROGRAM(S)/KG/MIN: 10 INJECTION INTRAVENOUS at 15:05

## 2021-10-04 RX ADMIN — CHLORHEXIDINE GLUCONATE 15 MILLILITER(S): 213 SOLUTION TOPICAL at 04:55

## 2021-10-04 RX ADMIN — PROPOFOL 2.69 MICROGRAM(S)/KG/MIN: 10 INJECTION, EMULSION INTRAVENOUS at 12:37

## 2021-10-04 RX ADMIN — HEPARIN SODIUM 5000 UNIT(S): 5000 INJECTION INTRAVENOUS; SUBCUTANEOUS at 21:37

## 2021-10-04 RX ADMIN — AMIODARONE HYDROCHLORIDE 133.33 MILLIGRAM(S): 400 TABLET ORAL at 20:21

## 2021-10-04 RX ADMIN — Medication 125 MILLILITER(S): at 15:01

## 2021-10-04 RX ADMIN — DEXMEDETOMIDINE HYDROCHLORIDE IN 0.9% SODIUM CHLORIDE 8.96 MICROGRAM(S)/KG/HR: 4 INJECTION INTRAVENOUS at 00:13

## 2021-10-04 RX ADMIN — HEPARIN SODIUM 5000 UNIT(S): 5000 INJECTION INTRAVENOUS; SUBCUTANEOUS at 14:56

## 2021-10-04 RX ADMIN — SODIUM CHLORIDE 3 MILLILITER(S): 9 INJECTION INTRAMUSCULAR; INTRAVENOUS; SUBCUTANEOUS at 15:01

## 2021-10-04 RX ADMIN — ATORVASTATIN CALCIUM 40 MILLIGRAM(S): 80 TABLET, FILM COATED ORAL at 21:37

## 2021-10-04 NOTE — PRE-OP CHECKLIST - SELECT TESTS ORDERED
BMP/CBC/CMP/PT/PTT/INR/Hepatic Function/Type and Cross/Type and Screen/Urinalysis/EKG/CXR/Results in MD note/COVID-19 98 mg/dl/BMP/CBC/CMP/PT/PTT/INR/Hepatic Function/Type and Cross/Type and Screen/Urinalysis/EKG/CXR/Results in MD note/POCT Blood Glucose/COVID-19

## 2021-10-04 NOTE — BRIEF OPERATIVE NOTE - NSICDXBRIEFPROCEDURE_GEN_ALL_CORE_FT
PROCEDURES:  Bronchoscopy, with lung decortication using VATS 04-Oct-2021 19:03:49 RIGHT VATS chest tube x3 Sharifa Angulo

## 2021-10-04 NOTE — PROGRESS NOTE ADULT - SUBJECTIVE AND OBJECTIVE BOX
CTICU  CRITICAL  CARE  attending     Hand off received 					   Pertinent clinical, laboratory, radiographic, hemodynamic, echocardiographic, respiratory data, microbiologic data and chart were reviewed and analyzed frequently throughout the course of the day and night  Patient seen and examined with CTS/ SH attending at bedside  Pt is a 71y , Male, HEALTH ISSUES - PROBLEM Dx:      , FAMILY HISTORY:  Family history of stroke (Sibling)    Family history of cerebrovascular accident (CVA) (Sibling, Mother)    Family history of acute myocardial infarction (Sibling)    PAST MEDICAL & SURGICAL HISTORY:  Benign prostatic hypertrophy    HTN (hypertension)    HLD (hyperlipidemia)    DM (diabetes mellitus)    Atherosclerosis of coronary artery  CAD (coronary artery disease)    Type 2 diabetes mellitus  Diabetes    Hyperlipidemia  Hyperlipidemia    Essential hypertension  Hypertension    Pericardial effusion    H/O mitral valve repair    S/P pericardial window creation      Patient is a 71y old  Male who presents with a chief complaint of possible decortication for loculated pleural effusion (03 Oct 2021 22:53)      14 system review limited by mentation and multiorgan morbidity     Vital signs, hemodynamic and respiratory parameters were reviewed from the bedside nursing flowsheet.  ICU Vital Signs Last 24 Hrs  T(C): 36.9 (04 Oct 2021 06:45), Max: 37 (04 Oct 2021 01:04)  T(F): 98.4 (04 Oct 2021 06:45), Max: 98.6 (04 Oct 2021 01:04)  HR: 47 (04 Oct 2021 07:00) (47 - 69)  BP: 82/50 (03 Oct 2021 23:00) (82/50 - 110/72)  BP(mean): 60 (03 Oct 2021 23:00) (60 - 86)  ABP: 136/60 (04 Oct 2021 08:00) (82/48 - 152/72)  ABP(mean): 85 (04 Oct 2021 08:00) (59 - 98)  RR: 16 (04 Oct 2021 08:00) (16 - 18)  SpO2: 98% (04 Oct 2021 08:00) (97% - 100%)    Adult Advanced Hemodynamics Last 24 Hrs  CVP(mm Hg): 12 (04 Oct 2021 06:00) (5 - 14)  CVP(cm H2O): --  CO: 4.4 (04 Oct 2021 06:00) (3.6 - 5.3)  CI: 2.2 (04 Oct 2021 06:00) (1.8 - 2.6)  PA: 35/14 (04 Oct 2021 07:00) (28/12 - 47/20)  PA(mean): 20 (04 Oct 2021 07:00) (18 - 32)  PCWP: --  SVR: 1561 (04 Oct 2021 06:00) (845 - 1823)  SVRI: 3123 (04 Oct 2021 06:00) (0426 - 2226)  PVR: --  PVRI: --, ABG - ( 04 Oct 2021 03:12 )  pH, Arterial: 7.43  pH, Blood: x     /  pCO2: 36    /  pO2: 124   / HCO3: 24    / Base Excess: -0.1  /  SaO2: 97.8              Mode: AC/ CMV (Assist Control/ Continuous Mandatory Ventilation)  RR (machine): 16  TV (machine): 500  FiO2: 50  PEEP: 7  ITime: 1  MAP: 12  PIP: 25    Intake and output was reviewed and the fluid balance was calculated  Daily     Daily Weight in k.6 (03 Oct 2021 11:55)  I&O's Summary    03 Oct 2021 07:01  -  04 Oct 2021 07:00  --------------------------------------------------------  IN: 2306.6 mL / OUT: 947 mL / NET: 1359.6 mL    04 Oct 2021 07:01  -  04 Oct 2021 09:14  --------------------------------------------------------  IN: 21.5 mL / OUT: 75 mL / NET: -53.5 mL        All lines and drain sites were assessed  Glycemic trend was reviewedCAPILLARY BLOOD GLUCOSE  127 (03 Oct 2021 10:00)      POCT Blood Glucose.: 132 mg/dL (03 Oct 2021 19:36)    No acute change in focality  Auscultation of the chest reveals equal bs  Abdomen is soft  Extremities are warm and well perfused  Wounds appear clean and unremarkable  Antibiotics are periop    labs  CBC Full  -  ( 04 Oct 2021 03:19 )  WBC Count : 7.81 K/uL  RBC Count : 3.32 M/uL  Hemoglobin : 9.5 g/dL  Hematocrit : 29.7 %  Platelet Count - Automated : 259 K/uL  Mean Cell Volume : 89.5 fl  Mean Cell Hemoglobin : 28.6 pg  Mean Cell Hemoglobin Concentration : 32.0 gm/dL  Auto Neutrophil # : x  Auto Lymphocyte # : x  Auto Monocyte # : x  Auto Eosinophil # : x  Auto Basophil # : x  Auto Neutrophil % : x  Auto Lymphocyte % : x  Auto Monocyte % : x  Auto Eosinophil % : x  Auto Basophil % : x    10-04    140  |  108  |  20  ----------------------------<  206<H>  3.9   |  24  |  0.92    Ca    8.4      04 Oct 2021 03:19  Phos  3.0     10-04  Mg     2.1     10-04    TPro  5.6<L>  /  Alb  3.5  /  TBili  0.7  /  DBili  x   /  AST  21  /  ALT  22  /  AlkPhos  102  10-04    PT/INR - ( 04 Oct 2021 03:19 )   PT: 15.5 sec;   INR: 1.31          PTT - ( 04 Oct 2021 03:19 )  PTT:30.3 sec  The current medications were reviewed   MEDICATIONS  (STANDING):  acetaminophen  IVPB .. 1000 milliGRAM(s) IV Intermittent once  aMIOdarone Infusion 0.25 mG/Min (8.33 mL/Hr) IV Continuous <Continuous>  aspirin enteric coated 81 milliGRAM(s) Oral daily  atorvastatin 40 milliGRAM(s) Oral at bedtime  chlorhexidine 0.12% Liquid 15 milliLiter(s) Oral Mucosa every 12 hours  dexMEDEtomidine Infusion 0.4 MICROgram(s)/kG/Hr (8.96 mL/Hr) IV Continuous <Continuous>  heparin   Injectable 5000 Unit(s) SubCutaneous every 8 hours  lactated ringers. 1000 milliLiter(s) (50 mL/Hr) IV Continuous <Continuous>  metoprolol tartrate Injectable 2.5 milliGRAM(s) IV Push every 6 hours  pantoprazole  Injectable 40 milliGRAM(s) IV Push daily  phenylephrine    Infusion 0.1 MICROgram(s)/kG/Min (3.36 mL/Hr) IV Continuous <Continuous>  propofol Infusion 5 MICROgram(s)/kG/Min (2.69 mL/Hr) IV Continuous <Continuous>  sodium chloride 0.9% lock flush 3 milliLiter(s) IV Push every 8 hours    MEDICATIONS  (PRN):  fentaNYL    Injectable 25 MICROGram(s) IV Push every 3 hours PRN Severe Pain (7 - 10)       PROBLEM LIST/ ASSESSMENT:  HEALTH ISSUES - PROBLEM Dx:      ,   Patient is a 71y old  Male who presents with a chief complaint of possible decortication for loculated pleural effusion (03 Oct 2021 22:53)     s/p cardiac surgery    for vats decort today             My plan includes :  close hemodynamic, ventilatory and drain monitoring and management per post op routine    Monitor for arrhythmias and monitor parameters for organ perfusion  beta blockade not administered due to hemodynamic instability and bradycardia  monitor neurologic status  Head of the bed should remain elevated to 45 deg .   chest PT and IS will be encouraged  monitor adequacy of oxygenation and ventilation and attempt to wean oxygen  antibiotic regimen will be tailored to the clinical, laboratory and microbiologic data  Nutritional goals will be met using po eventually , ensure adequate caloric intake and montior the same  Stress ulcer and VTE prophylaxis will be achieved    Glycemic control is satisfactory  Electrolytes have been repleted as necessary and wound care has been carried out. Pain control has been achieved.   agressive physical therapy and early mobility and ambulation goals will be met   The family was updated about the course and plan  CRITICAL CARE TIME personally provided by me  in evaluation and management, reassessments, review and interpretation of labs and x-rays, ventilator and hemodynamic management, formulating a plan and coordinating care: ___90____ MIN.  Time does not include procedural time. Time spent was non routine post-operarive caRE and included multiple and repeated evaluations at the bedside  CTICU ATTENDING     					    Lex Sierra MD

## 2021-10-04 NOTE — BRIEF OPERATIVE NOTE - COMMENTS
I first assisted for the entirety of the case, including but not limited to opening, port placement/docking, robotic instrumentation, chest tube placement, and closure. Dr. Loving first assisted for the entirety of the case, including but not limited to opening, port placement/docking, robotic instrumentation, chest tube placement, and closure.  I was present for this procedure and participated as first assistant as described by the PA above, unless otherwise noted below.

## 2021-10-05 ENCOUNTER — APPOINTMENT (OUTPATIENT)
Dept: CARDIOTHORACIC SURGERY | Facility: CLINIC | Age: 71
End: 2021-10-05

## 2021-10-05 LAB
ALBUMIN SERPL ELPH-MCNC: 3.3 G/DL — SIGNIFICANT CHANGE UP (ref 3.3–5)
ALBUMIN SERPL ELPH-MCNC: 3.5 G/DL — SIGNIFICANT CHANGE UP (ref 3.3–5)
ALBUMIN SERPL ELPH-MCNC: 3.6 G/DL — SIGNIFICANT CHANGE UP (ref 3.3–5)
ALBUMIN SERPL ELPH-MCNC: 3.7 G/DL — SIGNIFICANT CHANGE UP (ref 3.3–5)
ALP SERPL-CCNC: 130 U/L — HIGH (ref 40–120)
ALP SERPL-CCNC: 132 U/L — HIGH (ref 40–120)
ALP SERPL-CCNC: 143 U/L — HIGH (ref 40–120)
ALP SERPL-CCNC: 149 U/L — HIGH (ref 40–120)
ALT FLD-CCNC: 104 U/L — HIGH (ref 10–45)
ALT FLD-CCNC: 110 U/L — HIGH (ref 10–45)
ALT FLD-CCNC: 82 U/L — HIGH (ref 10–45)
ALT FLD-CCNC: 87 U/L — HIGH (ref 10–45)
ANION GAP SERPL CALC-SCNC: 11 MMOL/L — SIGNIFICANT CHANGE UP (ref 5–17)
ANION GAP SERPL CALC-SCNC: 12 MMOL/L — SIGNIFICANT CHANGE UP (ref 5–17)
ANION GAP SERPL CALC-SCNC: 8 MMOL/L — SIGNIFICANT CHANGE UP (ref 5–17)
ANION GAP SERPL CALC-SCNC: 9 MMOL/L — SIGNIFICANT CHANGE UP (ref 5–17)
APTT BLD: 28.6 SEC — SIGNIFICANT CHANGE UP (ref 27.5–35.5)
APTT BLD: 28.8 SEC — SIGNIFICANT CHANGE UP (ref 27.5–35.5)
APTT BLD: 30.5 SEC — SIGNIFICANT CHANGE UP (ref 27.5–35.5)
APTT BLD: 34 SEC — SIGNIFICANT CHANGE UP (ref 27.5–35.5)
AST SERPL-CCNC: 130 U/L — HIGH (ref 10–40)
AST SERPL-CCNC: 154 U/L — HIGH (ref 10–40)
AST SERPL-CCNC: 75 U/L — HIGH (ref 10–40)
AST SERPL-CCNC: 93 U/L — HIGH (ref 10–40)
BILIRUB SERPL-MCNC: 0.8 MG/DL — SIGNIFICANT CHANGE UP (ref 0.2–1.2)
BILIRUB SERPL-MCNC: 1 MG/DL — SIGNIFICANT CHANGE UP (ref 0.2–1.2)
BUN SERPL-MCNC: 18 MG/DL — SIGNIFICANT CHANGE UP (ref 7–23)
BUN SERPL-MCNC: 18 MG/DL — SIGNIFICANT CHANGE UP (ref 7–23)
BUN SERPL-MCNC: 20 MG/DL — SIGNIFICANT CHANGE UP (ref 7–23)
BUN SERPL-MCNC: 21 MG/DL — SIGNIFICANT CHANGE UP (ref 7–23)
CALCIUM SERPL-MCNC: 8.6 MG/DL — SIGNIFICANT CHANGE UP (ref 8.4–10.5)
CALCIUM SERPL-MCNC: 8.6 MG/DL — SIGNIFICANT CHANGE UP (ref 8.4–10.5)
CALCIUM SERPL-MCNC: 8.7 MG/DL — SIGNIFICANT CHANGE UP (ref 8.4–10.5)
CALCIUM SERPL-MCNC: 8.9 MG/DL — SIGNIFICANT CHANGE UP (ref 8.4–10.5)
CHLORIDE SERPL-SCNC: 107 MMOL/L — SIGNIFICANT CHANGE UP (ref 96–108)
CHLORIDE SERPL-SCNC: 109 MMOL/L — HIGH (ref 96–108)
CHLORIDE SERPL-SCNC: 110 MMOL/L — HIGH (ref 96–108)
CHLORIDE SERPL-SCNC: 110 MMOL/L — HIGH (ref 96–108)
CO2 SERPL-SCNC: 21 MMOL/L — LOW (ref 22–31)
CO2 SERPL-SCNC: 22 MMOL/L — SIGNIFICANT CHANGE UP (ref 22–31)
CO2 SERPL-SCNC: 22 MMOL/L — SIGNIFICANT CHANGE UP (ref 22–31)
CO2 SERPL-SCNC: 23 MMOL/L — SIGNIFICANT CHANGE UP (ref 22–31)
CREAT SERPL-MCNC: 0.9 MG/DL — SIGNIFICANT CHANGE UP (ref 0.5–1.3)
CREAT SERPL-MCNC: 0.9 MG/DL — SIGNIFICANT CHANGE UP (ref 0.5–1.3)
CREAT SERPL-MCNC: 0.91 MG/DL — SIGNIFICANT CHANGE UP (ref 0.5–1.3)
CREAT SERPL-MCNC: 0.94 MG/DL — SIGNIFICANT CHANGE UP (ref 0.5–1.3)
GAS PNL BLDA: SIGNIFICANT CHANGE UP
GLUCOSE BLDC GLUCOMTR-MCNC: 227 MG/DL — HIGH (ref 70–99)
GLUCOSE SERPL-MCNC: 109 MG/DL — HIGH (ref 70–99)
GLUCOSE SERPL-MCNC: 116 MG/DL — HIGH (ref 70–99)
GLUCOSE SERPL-MCNC: 185 MG/DL — HIGH (ref 70–99)
GLUCOSE SERPL-MCNC: 200 MG/DL — HIGH (ref 70–99)
GRAM STN FLD: SIGNIFICANT CHANGE UP
HCT VFR BLD CALC: 28.4 % — LOW (ref 39–50)
HCT VFR BLD CALC: 28.9 % — LOW (ref 39–50)
HCT VFR BLD CALC: 29.8 % — LOW (ref 39–50)
HCT VFR BLD CALC: 30.5 % — LOW (ref 39–50)
HGB BLD-MCNC: 9 G/DL — LOW (ref 13–17)
HGB BLD-MCNC: 9.2 G/DL — LOW (ref 13–17)
HGB BLD-MCNC: 9.7 G/DL — LOW (ref 13–17)
HGB BLD-MCNC: 9.8 G/DL — LOW (ref 13–17)
INR BLD: 1.28 — HIGH (ref 0.88–1.16)
INR BLD: 1.33 — HIGH (ref 0.88–1.16)
INR BLD: 1.33 — HIGH (ref 0.88–1.16)
INR BLD: 1.34 — HIGH (ref 0.88–1.16)
LACTATE SERPL-SCNC: 1.1 MMOL/L — SIGNIFICANT CHANGE UP (ref 0.5–2)
LACTATE SERPL-SCNC: 1.4 MMOL/L — SIGNIFICANT CHANGE UP (ref 0.5–2)
MAGNESIUM SERPL-MCNC: 2 MG/DL — SIGNIFICANT CHANGE UP (ref 1.6–2.6)
MAGNESIUM SERPL-MCNC: 2.2 MG/DL — SIGNIFICANT CHANGE UP (ref 1.6–2.6)
MAGNESIUM SERPL-MCNC: 2.3 MG/DL — SIGNIFICANT CHANGE UP (ref 1.6–2.6)
MAGNESIUM SERPL-MCNC: 2.3 MG/DL — SIGNIFICANT CHANGE UP (ref 1.6–2.6)
MCHC RBC-ENTMCNC: 28.7 PG — SIGNIFICANT CHANGE UP (ref 27–34)
MCHC RBC-ENTMCNC: 28.9 PG — SIGNIFICANT CHANGE UP (ref 27–34)
MCHC RBC-ENTMCNC: 28.9 PG — SIGNIFICANT CHANGE UP (ref 27–34)
MCHC RBC-ENTMCNC: 29.6 PG — SIGNIFICANT CHANGE UP (ref 27–34)
MCHC RBC-ENTMCNC: 31.7 GM/DL — LOW (ref 32–36)
MCHC RBC-ENTMCNC: 31.8 GM/DL — LOW (ref 32–36)
MCHC RBC-ENTMCNC: 31.8 GM/DL — LOW (ref 32–36)
MCHC RBC-ENTMCNC: 32.9 GM/DL — SIGNIFICANT CHANGE UP (ref 32–36)
MCV RBC AUTO: 90 FL — SIGNIFICANT CHANGE UP (ref 80–100)
MCV RBC AUTO: 90.4 FL — SIGNIFICANT CHANGE UP (ref 80–100)
MCV RBC AUTO: 90.8 FL — SIGNIFICANT CHANGE UP (ref 80–100)
MCV RBC AUTO: 90.9 FL — SIGNIFICANT CHANGE UP (ref 80–100)
NRBC # BLD: 0 /100 WBCS — SIGNIFICANT CHANGE UP (ref 0–0)
PHOSPHATE SERPL-MCNC: 2.2 MG/DL — LOW (ref 2.5–4.5)
PHOSPHATE SERPL-MCNC: 2.9 MG/DL — SIGNIFICANT CHANGE UP (ref 2.5–4.5)
PHOSPHATE SERPL-MCNC: 3.7 MG/DL — SIGNIFICANT CHANGE UP (ref 2.5–4.5)
PHOSPHATE SERPL-MCNC: 4.4 MG/DL — SIGNIFICANT CHANGE UP (ref 2.5–4.5)
PLATELET # BLD AUTO: 256 K/UL — SIGNIFICANT CHANGE UP (ref 150–400)
PLATELET # BLD AUTO: 271 K/UL — SIGNIFICANT CHANGE UP (ref 150–400)
PLATELET # BLD AUTO: 295 K/UL — SIGNIFICANT CHANGE UP (ref 150–400)
PLATELET # BLD AUTO: 297 K/UL — SIGNIFICANT CHANGE UP (ref 150–400)
POTASSIUM SERPL-MCNC: 4.3 MMOL/L — SIGNIFICANT CHANGE UP (ref 3.5–5.3)
POTASSIUM SERPL-MCNC: 4.4 MMOL/L — SIGNIFICANT CHANGE UP (ref 3.5–5.3)
POTASSIUM SERPL-MCNC: 4.4 MMOL/L — SIGNIFICANT CHANGE UP (ref 3.5–5.3)
POTASSIUM SERPL-MCNC: 4.6 MMOL/L — SIGNIFICANT CHANGE UP (ref 3.5–5.3)
POTASSIUM SERPL-SCNC: 4.3 MMOL/L — SIGNIFICANT CHANGE UP (ref 3.5–5.3)
POTASSIUM SERPL-SCNC: 4.4 MMOL/L — SIGNIFICANT CHANGE UP (ref 3.5–5.3)
POTASSIUM SERPL-SCNC: 4.4 MMOL/L — SIGNIFICANT CHANGE UP (ref 3.5–5.3)
POTASSIUM SERPL-SCNC: 4.6 MMOL/L — SIGNIFICANT CHANGE UP (ref 3.5–5.3)
PROT SERPL-MCNC: 5.6 G/DL — LOW (ref 6–8.3)
PROT SERPL-MCNC: 5.6 G/DL — LOW (ref 6–8.3)
PROT SERPL-MCNC: 5.7 G/DL — LOW (ref 6–8.3)
PROT SERPL-MCNC: 5.9 G/DL — LOW (ref 6–8.3)
PROTHROM AB SERPL-ACNC: 15.2 SEC — HIGH (ref 10.6–13.6)
PROTHROM AB SERPL-ACNC: 15.7 SEC — HIGH (ref 10.6–13.6)
PROTHROM AB SERPL-ACNC: 15.7 SEC — HIGH (ref 10.6–13.6)
PROTHROM AB SERPL-ACNC: 15.9 SEC — HIGH (ref 10.6–13.6)
RBC # BLD: 3.14 M/UL — LOW (ref 4.2–5.8)
RBC # BLD: 3.18 M/UL — LOW (ref 4.2–5.8)
RBC # BLD: 3.31 M/UL — LOW (ref 4.2–5.8)
RBC # BLD: 3.36 M/UL — LOW (ref 4.2–5.8)
RBC # FLD: 14.5 % — SIGNIFICANT CHANGE UP (ref 10.3–14.5)
RBC # FLD: 14.5 % — SIGNIFICANT CHANGE UP (ref 10.3–14.5)
RBC # FLD: 14.6 % — HIGH (ref 10.3–14.5)
RBC # FLD: 14.6 % — HIGH (ref 10.3–14.5)
SODIUM SERPL-SCNC: 141 MMOL/L — SIGNIFICANT CHANGE UP (ref 135–145)
SPECIMEN SOURCE: SIGNIFICANT CHANGE UP
WBC # BLD: 10.08 K/UL — SIGNIFICANT CHANGE UP (ref 3.8–10.5)
WBC # BLD: 11.04 K/UL — HIGH (ref 3.8–10.5)
WBC # BLD: 11.43 K/UL — HIGH (ref 3.8–10.5)
WBC # BLD: 9.65 K/UL — SIGNIFICANT CHANGE UP (ref 3.8–10.5)
WBC # FLD AUTO: 10.08 K/UL — SIGNIFICANT CHANGE UP (ref 3.8–10.5)
WBC # FLD AUTO: 11.04 K/UL — HIGH (ref 3.8–10.5)
WBC # FLD AUTO: 11.43 K/UL — HIGH (ref 3.8–10.5)
WBC # FLD AUTO: 9.65 K/UL — SIGNIFICANT CHANGE UP (ref 3.8–10.5)

## 2021-10-05 PROCEDURE — 93308 TTE F-UP OR LMTD: CPT | Mod: 26

## 2021-10-05 PROCEDURE — 31645 BRNCHSC W/THER ASPIR 1ST: CPT

## 2021-10-05 PROCEDURE — 99291 CRITICAL CARE FIRST HOUR: CPT | Mod: 25

## 2021-10-05 PROCEDURE — 99292 CRITICAL CARE ADDL 30 MIN: CPT | Mod: 25

## 2021-10-05 PROCEDURE — 99292 CRITICAL CARE ADDL 30 MIN: CPT

## 2021-10-05 PROCEDURE — 99223 1ST HOSP IP/OBS HIGH 75: CPT

## 2021-10-05 PROCEDURE — 71045 X-RAY EXAM CHEST 1 VIEW: CPT | Mod: 26

## 2021-10-05 PROCEDURE — 93010 ELECTROCARDIOGRAM REPORT: CPT

## 2021-10-05 RX ORDER — DILTIAZEM HCL 120 MG
10 CAPSULE, EXT RELEASE 24 HR ORAL ONCE
Refills: 0 | Status: COMPLETED | OUTPATIENT
Start: 2021-10-05 | End: 2021-10-05

## 2021-10-05 RX ORDER — DEXTROSE 50 % IN WATER 50 %
12.5 SYRINGE (ML) INTRAVENOUS ONCE
Refills: 0 | Status: DISCONTINUED | OUTPATIENT
Start: 2021-10-05 | End: 2021-10-10

## 2021-10-05 RX ORDER — METOPROLOL TARTRATE 50 MG
5 TABLET ORAL ONCE
Refills: 0 | Status: COMPLETED | OUTPATIENT
Start: 2021-10-05 | End: 2021-10-05

## 2021-10-05 RX ORDER — DIGOXIN 250 MCG
500 TABLET ORAL ONCE
Refills: 0 | Status: COMPLETED | OUTPATIENT
Start: 2021-10-05 | End: 2021-10-05

## 2021-10-05 RX ORDER — FUROSEMIDE 40 MG
20 TABLET ORAL ONCE
Refills: 0 | Status: COMPLETED | OUTPATIENT
Start: 2021-10-05 | End: 2021-10-05

## 2021-10-05 RX ORDER — DEXTROSE 50 % IN WATER 50 %
25 SYRINGE (ML) INTRAVENOUS ONCE
Refills: 0 | Status: DISCONTINUED | OUTPATIENT
Start: 2021-10-05 | End: 2021-10-10

## 2021-10-05 RX ORDER — GLUCAGON INJECTION, SOLUTION 0.5 MG/.1ML
1 INJECTION, SOLUTION SUBCUTANEOUS ONCE
Refills: 0 | Status: DISCONTINUED | OUTPATIENT
Start: 2021-10-05 | End: 2021-10-10

## 2021-10-05 RX ORDER — SODIUM CHLORIDE 9 MG/ML
1000 INJECTION, SOLUTION INTRAVENOUS
Refills: 0 | Status: DISCONTINUED | OUTPATIENT
Start: 2021-10-05 | End: 2021-10-10

## 2021-10-05 RX ORDER — METOPROLOL TARTRATE 50 MG
2.5 TABLET ORAL ONCE
Refills: 0 | Status: COMPLETED | OUTPATIENT
Start: 2021-10-05 | End: 2021-10-05

## 2021-10-05 RX ORDER — PIPERACILLIN AND TAZOBACTAM 4; .5 G/20ML; G/20ML
3.38 INJECTION, POWDER, LYOPHILIZED, FOR SOLUTION INTRAVENOUS EVERY 6 HOURS
Refills: 0 | Status: DISCONTINUED | OUTPATIENT
Start: 2021-10-05 | End: 2021-10-11

## 2021-10-05 RX ORDER — CEFAZOLIN SODIUM 1 G
2000 VIAL (EA) INJECTION EVERY 8 HOURS
Refills: 0 | Status: DISCONTINUED | OUTPATIENT
Start: 2021-10-05 | End: 2021-10-05

## 2021-10-05 RX ORDER — DEXTROSE 50 % IN WATER 50 %
15 SYRINGE (ML) INTRAVENOUS ONCE
Refills: 0 | Status: DISCONTINUED | OUTPATIENT
Start: 2021-10-05 | End: 2021-10-10

## 2021-10-05 RX ORDER — PIPERACILLIN AND TAZOBACTAM 4; .5 G/20ML; G/20ML
3.38 INJECTION, POWDER, LYOPHILIZED, FOR SOLUTION INTRAVENOUS ONCE
Refills: 0 | Status: COMPLETED | OUTPATIENT
Start: 2021-10-05 | End: 2021-10-05

## 2021-10-05 RX ORDER — ALBUMIN HUMAN 25 %
250 VIAL (ML) INTRAVENOUS
Refills: 0 | Status: COMPLETED | OUTPATIENT
Start: 2021-10-05 | End: 2021-10-05

## 2021-10-05 RX ORDER — CALCIUM GLUCONATE 100 MG/ML
2 VIAL (ML) INTRAVENOUS ONCE
Refills: 0 | Status: COMPLETED | OUTPATIENT
Start: 2021-10-05 | End: 2021-10-05

## 2021-10-05 RX ORDER — METOPROLOL TARTRATE 50 MG
2.5 TABLET ORAL EVERY 4 HOURS
Refills: 0 | Status: DISCONTINUED | OUTPATIENT
Start: 2021-10-05 | End: 2021-10-06

## 2021-10-05 RX ORDER — VANCOMYCIN HCL 1 G
1250 VIAL (EA) INTRAVENOUS EVERY 12 HOURS
Refills: 0 | Status: DISCONTINUED | OUTPATIENT
Start: 2021-10-05 | End: 2021-10-06

## 2021-10-05 RX ORDER — DILTIAZEM HCL 120 MG
5 CAPSULE, EXT RELEASE 24 HR ORAL ONCE
Refills: 0 | Status: COMPLETED | OUTPATIENT
Start: 2021-10-05 | End: 2021-10-05

## 2021-10-05 RX ORDER — INSULIN LISPRO 100/ML
VIAL (ML) SUBCUTANEOUS EVERY 6 HOURS
Refills: 0 | Status: DISCONTINUED | OUTPATIENT
Start: 2021-10-05 | End: 2021-10-10

## 2021-10-05 RX ORDER — ALBUMIN HUMAN 25 %
250 VIAL (ML) INTRAVENOUS ONCE
Refills: 0 | Status: COMPLETED | OUTPATIENT
Start: 2021-10-05 | End: 2021-10-05

## 2021-10-05 RX ORDER — LEVALBUTEROL 1.25 MG/.5ML
0.63 SOLUTION, CONCENTRATE RESPIRATORY (INHALATION) EVERY 6 HOURS
Refills: 0 | Status: DISCONTINUED | OUTPATIENT
Start: 2021-10-05 | End: 2021-10-11

## 2021-10-05 RX ORDER — AMIODARONE HYDROCHLORIDE 400 MG/1
0.25 TABLET ORAL
Qty: 900 | Refills: 0 | Status: DISCONTINUED | OUTPATIENT
Start: 2021-10-05 | End: 2021-10-05

## 2021-10-05 RX ORDER — AMIODARONE HYDROCHLORIDE 400 MG/1
150 TABLET ORAL ONCE
Refills: 0 | Status: DISCONTINUED | OUTPATIENT
Start: 2021-10-05 | End: 2021-10-05

## 2021-10-05 RX ORDER — DILTIAZEM HCL 120 MG
5 CAPSULE, EXT RELEASE 24 HR ORAL
Qty: 125 | Refills: 0 | Status: DISCONTINUED | OUTPATIENT
Start: 2021-10-05 | End: 2021-10-05

## 2021-10-05 RX ORDER — DEXMEDETOMIDINE HYDROCHLORIDE IN 0.9% SODIUM CHLORIDE 4 UG/ML
0.2 INJECTION INTRAVENOUS
Qty: 400 | Refills: 0 | Status: DISCONTINUED | OUTPATIENT
Start: 2021-10-05 | End: 2021-10-08

## 2021-10-05 RX ADMIN — Medication 5 MG/HR: at 10:31

## 2021-10-05 RX ADMIN — PROPOFOL 2.69 MICROGRAM(S)/KG/MIN: 10 INJECTION, EMULSION INTRAVENOUS at 10:30

## 2021-10-05 RX ADMIN — HEPARIN SODIUM 5000 UNIT(S): 5000 INJECTION INTRAVENOUS; SUBCUTANEOUS at 21:15

## 2021-10-05 RX ADMIN — PROPOFOL 2.69 MICROGRAM(S)/KG/MIN: 10 INJECTION, EMULSION INTRAVENOUS at 03:46

## 2021-10-05 RX ADMIN — Medication 100 MILLIGRAM(S): at 18:01

## 2021-10-05 RX ADMIN — Medication 2.5 MILLIGRAM(S): at 12:43

## 2021-10-05 RX ADMIN — DEXMEDETOMIDINE HYDROCHLORIDE IN 0.9% SODIUM CHLORIDE 4.48 MICROGRAM(S)/KG/HR: 4 INJECTION INTRAVENOUS at 12:43

## 2021-10-05 RX ADMIN — Medication 500 MICROGRAM(S): at 21:15

## 2021-10-05 RX ADMIN — Medication 4: at 23:44

## 2021-10-05 RX ADMIN — Medication 125 MILLILITER(S): at 12:09

## 2021-10-05 RX ADMIN — Medication 20 MILLIGRAM(S): at 23:41

## 2021-10-05 RX ADMIN — Medication 2.5 MILLIGRAM(S): at 21:16

## 2021-10-05 RX ADMIN — Medication 5 MILLIGRAM(S): at 17:14

## 2021-10-05 RX ADMIN — Medication 10 MILLIGRAM(S): at 10:44

## 2021-10-05 RX ADMIN — PIPERACILLIN AND TAZOBACTAM 200 GRAM(S): 4; .5 INJECTION, POWDER, LYOPHILIZED, FOR SOLUTION INTRAVENOUS at 21:15

## 2021-10-05 RX ADMIN — Medication 166.67 MILLIGRAM(S): at 22:18

## 2021-10-05 RX ADMIN — AMIODARONE HYDROCHLORIDE 8.33 MG/MIN: 400 TABLET ORAL at 02:52

## 2021-10-05 RX ADMIN — Medication 100 MILLIGRAM(S): at 02:29

## 2021-10-05 RX ADMIN — Medication 125 MILLILITER(S): at 02:28

## 2021-10-05 RX ADMIN — CHLORHEXIDINE GLUCONATE 1 APPLICATION(S): 213 SOLUTION TOPICAL at 05:22

## 2021-10-05 RX ADMIN — CHLORHEXIDINE GLUCONATE 15 MILLILITER(S): 213 SOLUTION TOPICAL at 05:22

## 2021-10-05 RX ADMIN — HEPARIN SODIUM 5000 UNIT(S): 5000 INJECTION INTRAVENOUS; SUBCUTANEOUS at 14:43

## 2021-10-05 RX ADMIN — Medication 2.5 MILLIGRAM(S): at 20:33

## 2021-10-05 RX ADMIN — Medication 2.5 MILLIGRAM(S): at 12:18

## 2021-10-05 RX ADMIN — Medication 5 MILLIGRAM(S): at 02:28

## 2021-10-05 RX ADMIN — HEPARIN SODIUM 5000 UNIT(S): 5000 INJECTION INTRAVENOUS; SUBCUTANEOUS at 05:22

## 2021-10-05 RX ADMIN — Medication 2.5 MILLIGRAM(S): at 18:17

## 2021-10-05 RX ADMIN — CHLORHEXIDINE GLUCONATE 15 MILLILITER(S): 213 SOLUTION TOPICAL at 18:01

## 2021-10-05 RX ADMIN — Medication 2.5 MILLIGRAM(S): at 00:05

## 2021-10-05 RX ADMIN — Medication 100 MILLIGRAM(S): at 10:30

## 2021-10-05 RX ADMIN — Medication 200 GRAM(S): at 01:43

## 2021-10-05 RX ADMIN — Medication 2.5 MILLIGRAM(S): at 20:16

## 2021-10-05 RX ADMIN — Medication 125 MILLILITER(S): at 13:14

## 2021-10-05 RX ADMIN — Medication 81 MILLIGRAM(S): at 11:19

## 2021-10-05 RX ADMIN — PANTOPRAZOLE SODIUM 40 MILLIGRAM(S): 20 TABLET, DELAYED RELEASE ORAL at 11:19

## 2021-10-05 RX ADMIN — Medication 2.5 MILLIGRAM(S): at 05:22

## 2021-10-05 NOTE — CONSULT NOTE ADULT - SUBJECTIVE AND OBJECTIVE BOX
Electrophysiology Consult Note:     CHIEF COMPLAINT:  Patient is a 71y old  Male who presents with a chief complaint of possible decortication for loculated pleural effusion (04 Oct 2021 09:14)        HISTORY OF PRESENT ILLNESS:   HPI:  Patient intubated/sedated, information was taken from the chart.     72 yo Irish/English speaking male, former smoker w/ PMHx of severe mitral valve insufficiency (recent MV repair 9/20/21), atrial fibrillation, HFrEF (30%), CAD (s/p PCI 2018), HTN, HLD, DM, tonsil cancer s/p surgery + radiation in 2019 who presented to [facility] with acute SOB.  At the time, he denied chest pain, palpitations, diaphoresis. No F/C/NS/N/V/D. No cough, calf tenderness or increased LE edema.  Previous hospital course here at [facility] post MVR was complicated by right hemothorax requiring thoracocentesis (dry tap) and post op afib, went home on amio maintenance and BB, ultimately discharged home on 9/25/21.     In the ED at [facility], patient was initially hypertensive, although became hypotensive with SBP 90s s/p nitroglycerin. Levophed was started for pressor support.     Patient admitted to CICU;  TTE showed "Severe LV systolic fxn, EF 29%, large pericardial effusion posterior and lateral to the LV (2.5cm), the pericardial effusion measures 1.2cm adjacent to the RV, reduced diastolic expansion of the apical free wall of the RV, however diagnosis of tamponade limited by ". Findings on TTE were significant for pericardial tamponade. IR was consulted and a CT chest w/out contrast was completed. After quick review, IR stated it is not drainable.     Dr. Pena took the patient to the OR on 10/2/21 for a subxiphoid pericardial window, draining 500cc of fluid with relief of tamponade and placement of pericardial drain. Patient's HR was still aflutter 120s-130s, and an Amiodarone drip was initiated with improvement of HR to 60s. Patient was stable for transfer here today for continued management of his pericardial drain and evaluation by thoracic surgeons for possible complex decortication of right lung.  Currently he remains intubated and sedated.    Patient had  VATS on 10/4/21. He was noted to be in AT/A.flutter with HR 133BPM  EPS called to evaluate.         PAST MEDICAL & SURGICAL HISTORY:  Benign prostatic hypertrophy    HTN (hypertension)    HLD (hyperlipidemia)    DM (diabetes mellitus)    Atherosclerosis of coronary artery  CAD (coronary artery disease)    Type 2 diabetes mellitus  Diabetes    Hyperlipidemia  Hyperlipidemia    Essential hypertension  Hypertension    Pericardial effusion    H/O mitral valve repair    S/P pericardial window creation        FAMILY HISTORY:  Family history of stroke (Sibling)    Family history of cerebrovascular accident (CVA) (Sibling, Mother)    Family history of acute myocardial infarction (Sibling)        SOCIAL HISTORY:    [x ] Non-smoker      Allergies    No Known Allergies    Intolerances    	    MEDICATIONS:  MEDICATIONS  (STANDING):  aMIOdarone Infusion 0.25 mG/Min (8.33 mL/Hr) IV Continuous <Continuous>  aspirin enteric coated 81 milliGRAM(s) Oral daily  atorvastatin 40 milliGRAM(s) Oral at bedtime  ceFAZolin   IVPB 2000 milliGRAM(s) IV Intermittent every 8 hours  chlorhexidine 0.12% Liquid 15 milliLiter(s) Oral Mucosa every 12 hours  chlorhexidine 2% Cloths 1 Application(s) Topical <User Schedule>  dexMEDEtomidine Infusion 0.2 MICROgram(s)/kG/Hr (4.48 mL/Hr) IV Continuous <Continuous>  diltiazem Infusion 5 mG/Hr (5 mL/Hr) IV Continuous <Continuous>  heparin   Injectable 5000 Unit(s) SubCutaneous every 8 hours  metoprolol tartrate Injectable 2.5 milliGRAM(s) IV Push every 6 hours  pantoprazole  Injectable 40 milliGRAM(s) IV Push daily  phenylephrine    Infusion 0.1 MICROgram(s)/kG/Min (3.36 mL/Hr) IV Continuous <Continuous>  propofol Infusion 5 MICROgram(s)/kG/Min (2.69 mL/Hr) IV Continuous <Continuous>  sodium chloride 0.9%. 1000 milliLiter(s) (10 mL/Hr) IV Continuous <Continuous>    MEDICATIONS  (PRN):  fentaNYL    Injectable 25 MICROGram(s) IV Push every 3 hours PRN Severe Pain (7 - 10)        REVIEW OF SYSTEMS:  Patient is intubated/sedated    PHYSICAL EXAM:  Vital Signs Last 24 Hrs  T(C): 36.8 (05 Oct 2021 13:35), Max: 36.8 (05 Oct 2021 13:35)  T(F): 98.2 (05 Oct 2021 13:35), Max: 98.2 (05 Oct 2021 13:35)  HR: 67 (05 Oct 2021 14:00) (67 - 133)  BP: --  BP(mean): --  RR: 18 (05 Oct 2021 14:00) (16 - 23)  SpO2: 100% (05 Oct 2021 14:00) (97% - 100%)  Daily     Daily     	  CVS: S1 S2,tachycardic  No JVD,  No edema  Pulm: intubated   GI:  Soft, Non-tender, + BS	  Ext: No LE edema  Neurologic: sedated  Skin: No rash or lesion       	  LABS:	                         9.8    11.04 )-----------( 295      ( 05 Oct 2021 09:24 )             29.8     10-05    141  |  109<H>  |  18  ----------------------------<  116<H>  4.6   |  21<L>  |  0.90    Ca    8.7      05 Oct 2021 09:24  Phos  3.7     10-05  Mg     2.2     10-05    TPro  5.6<L>  /  Alb  3.3  /  TBili  0.8  /  DBili  x   /  AST  154<H>  /  ALT  110<H>  /  AlkPhos  143<H>  10-05    proBNP:   Lipid Profile:   HgA1c:   TSH: 	      EKG: < from: 12 Lead ECG (10.02.21 @ 02:01) >  Systolic  mmHg    Diastolic BP 86 mmHg    Ventricular Rate 133 BPM    Atrial Rate 51 BPM    QRS Duration 168 ms    Q-T Interval 394 ms    QTC Calculation(Bazett) 586 ms    R Axis 49 degrees    T Axis 240 degrees    Diagnosis Line WIDE QRS TACHYCARDIA likley atrial tachycardia  with 2:1 block and LBBB  LEFT BUNDLE BRANCH BLOCK  ABNORMAL ECG  WHEN COMPARED WITH ECG OF `10/1/21   Rhythm is more regular    Telemetry: AT/A.FLutter    Echo: < from: TTE with Doppler (w/Cont) (10.02.21 @ 06:53) >  1. An annuloplasty ring is seen in the mitral position.  Peak mitral valve gradient equals 14 mm Hg, mean  transmitral valve gradient equals 3 mm Hg, heart rate 140  bpm/  2. Severely dilated left atrium.  LA volume index = 49  cc/m2.  3. Left ventricular enlargement.  4. Severe   left ventricular systolic dysfunction.  The  apex is akinetic/dyskinetic.   Endocardial visualization  enhanced with intravenous injection of Ultrasonic Enhancing  Agent (Definity). No left ventricular thrombus.   Septal motion consistent with cardiac surgery/BBB..  5. The right ventricle is not well visualized; grossly  normal right ventricular systolic function.   A device wire  is noted in the right heart.  6. Large pericardial effusion posterior and lateral to the  left ventricle (2.5 cm).     The pericardial effusion  measures 1.2  cm adjacent to the right ventricle as seen in  the subcostal view.   Dilated IVC.   Reduced diastolic  expansion of the apical free wall of the right ventricle.  Diagnosis of tamponade limited by heart rate of 140 bpm  (afib with RVR?).  *** Compared with echocardiogram of 12/16/2019, s/pmitral  valve repair.   There is a moderate to large pericardial  effusion.

## 2021-10-05 NOTE — PROGRESS NOTE ADULT - SUBJECTIVE AND OBJECTIVE BOX
CTICU  CRITICAL  CARE  PROCEDURE  NOTE      				     Name of procedure – Flexible fiberoptic bronchoscopy    Indication –   Respiratory failure    Flexible fiberoptic bronchoscopy was performed under propofol and fentanyl sedation while the patient was intubated for controlled mechanical ventilation  Pre-procedural assessment reveals no risk of Tb  Ventilator settings were adjusted to reduce airway pressures to reduce the risk of ptx  The scope was advanced past the ett which was noted to be 2 cm the fuad   The fuad was sharp  Right LL and RML air way were patent , mucopur thick yellow secretions  Lavaged and sent for culture  Left LL air way  patent with thin mucus  CXR confirmed no pneumothorax post bronch  There were no complications  The patient tolerated the procedure well    Waqas Long M.D.

## 2021-10-05 NOTE — CONSULT NOTE ADULT - ASSESSMENT
70 yo Chinese/English speaking male, with history of severe mitral valve insufficiency (recent MV repair 9/20/21), atrial fibrillation, HFrEF (30%), CAD (s/p PCI 2018), HTN, HLD, DM, tonsil cancer s/p surgery + radiation in 2019 who presented to [facility] with acute SOB.  Patient was recently discharged from [facility] after  MVR complicated by right hemothorax requiring thoracocentesis (dry tap) and post op afib, he was started on amiodarone  and BB, and  discharged home on 9/25/21. Patient had pericardial window with placement of pericardial drain on 102/21, transferred to [facility] and had VATS on 10/4/21  Patient was in AT/atrial flutter with poor rate control, continue amiodarone add BB, restart anticoagulation when cleared by surgery, will follow.

## 2021-10-05 NOTE — PROGRESS NOTE ADULT - SUBJECTIVE AND OBJECTIVE BOX
CTICU  CRITICAL  CARE  attending     Hand off received 					   Pertinent clinical, laboratory, radiographic, hemodynamic, echocardiographic, respiratory data, microbiologic data and chart were reviewed and analyzed frequently throughout the course of the day and night  Patient seen and examined with CTS/ SH attending at bedside  Pt is a 71y , Male, HEALTH ISSUES - PROBLEM Dx:      , FAMILY HISTORY:  Family history of stroke (Sibling)    Family history of cerebrovascular accident (CVA) (Sibling, Mother)    Family history of acute myocardial infarction (Sibling)    PAST MEDICAL & SURGICAL HISTORY:  Benign prostatic hypertrophy    HTN (hypertension)    HLD (hyperlipidemia)    DM (diabetes mellitus)    Atherosclerosis of coronary artery  CAD (coronary artery disease)    Type 2 diabetes mellitus  Diabetes    Hyperlipidemia  Hyperlipidemia    Essential hypertension  Hypertension    Pericardial effusion    H/O mitral valve repair    S/P pericardial window creation      Patient is a 71y old  Male who presents with a chief complaint of possible decortication for loculated pleural effusion (05 Oct 2021 16:12)      14 system review limited by mentation and multiorgan morbidity     Vital signs, hemodynamic and respiratory parameters were reviewed from the bedside nursing flowsheet.  ICU Vital Signs Last 24 Hrs  T(C): 37.6 (05 Oct 2021 16:47), Max: 37.6 (05 Oct 2021 16:47)  T(F): 99.7 (05 Oct 2021 16:47), Max: 99.7 (05 Oct 2021 16:47)  HR: 89 (05 Oct 2021 19:00) (67 - 133)  BP: --  BP(mean): --  ABP: 150/58 (05 Oct 2021 19:00) (59/48 - 150/58)  ABP(mean): 79 (05 Oct 2021 19:00) (52 - 97)  RR: 18 (05 Oct 2021 19:00) (18 - 22)  SpO2: 100% (05 Oct 2021 19:00) (97% - 100%)    Adult Advanced Hemodynamics Last 24 Hrs  CVP(mm Hg): 11 (05 Oct 2021 19:00) (9 - 31)  CVP(cm H2O): --  CO: --  CI: --  PA: --  PA(mean): --  PCWP: --  SVR: --  SVRI: --  PVR: --  PVRI: --, ABG - ( 05 Oct 2021 17:55 )  pH, Arterial: 7.43  pH, Blood: x     /  pCO2: 31    /  pO2: 98    / HCO3: 21    / Base Excess: -2.7  /  SaO2: 98.0              Mode: AC/ CMV (Assist Control/ Continuous Mandatory Ventilation)  RR (machine): 16  TV (machine): 500  FiO2: 60  PEEP: 8  ITime: 1  MAP: 15  PIP: 26    Intake and output was reviewed and the fluid balance was calculated  Daily     Daily   I&O's Summary    04 Oct 2021 07:01  -  05 Oct 2021 07:00  --------------------------------------------------------  IN: 1551.1 mL / OUT: 1872 mL / NET: -320.9 mL    05 Oct 2021 07:01  -  05 Oct 2021 20:00  --------------------------------------------------------  IN: 1191.9 mL / OUT: 753 mL / NET: 438.9 mL        All lines and drain sites were assessed  Glycemic trend was reviewedCAPILLARY BLOOD GLUCOSE      POCT Blood Glucose.: 145 mg/dL (04 Oct 2021 21:43)    No acute change in focality  Auscultation of the chest reveals equal bs  Abdomen is soft  Extremities are warm and well perfused  Wounds appear clean and unremarkable  Antibiotics are periop    labs  CBC Full  -  ( 05 Oct 2021 17:54 )  WBC Count : 9.65 K/uL  RBC Count : 3.14 M/uL  Hemoglobin : 9.0 g/dL  Hematocrit : 28.4 %  Platelet Count - Automated : 256 K/uL  Mean Cell Volume : 90.4 fl  Mean Cell Hemoglobin : 28.7 pg  Mean Cell Hemoglobin Concentration : 31.7 gm/dL  Auto Neutrophil # : x  Auto Lymphocyte # : x  Auto Monocyte # : x  Auto Eosinophil # : x  Auto Basophil # : x  Auto Neutrophil % : x  Auto Lymphocyte % : x  Auto Monocyte % : x  Auto Eosinophil % : x  Auto Basophil % : x    10-05    141  |  110<H>  |  20  ----------------------------<  185<H>  4.4   |  22  |  0.94    Ca    8.6      05 Oct 2021 17:54  Phos  2.9     10-05  Mg     2.3     10-05    TPro  5.6<L>  /  Alb  3.6  /  TBili  1.0  /  DBili  x   /  AST  93<H>  /  ALT  87<H>  /  AlkPhos  130<H>  10-05    PT/INR - ( 05 Oct 2021 17:54 )   PT: 15.7 sec;   INR: 1.33          PTT - ( 05 Oct 2021 17:54 )  PTT:34.0 sec  The current medications were reviewed   MEDICATIONS  (STANDING):  aspirin enteric coated 81 milliGRAM(s) Oral daily  ceFAZolin   IVPB 2000 milliGRAM(s) IV Intermittent every 8 hours  chlorhexidine 0.12% Liquid 15 milliLiter(s) Oral Mucosa every 12 hours  chlorhexidine 2% Cloths 1 Application(s) Topical <User Schedule>  dexMEDEtomidine Infusion 0.2 MICROgram(s)/kG/Hr (4.48 mL/Hr) IV Continuous <Continuous>  heparin   Injectable 5000 Unit(s) SubCutaneous every 8 hours  metoprolol tartrate Injectable 2.5 milliGRAM(s) IV Push every 4 hours  pantoprazole  Injectable 40 milliGRAM(s) IV Push daily  phenylephrine    Infusion 0.1 MICROgram(s)/kG/Min (3.36 mL/Hr) IV Continuous <Continuous>  propofol Infusion 5 MICROgram(s)/kG/Min (2.69 mL/Hr) IV Continuous <Continuous>  sodium chloride 0.9%. 1000 milliLiter(s) (10 mL/Hr) IV Continuous <Continuous>    MEDICATIONS  (PRN):  fentaNYL    Injectable 25 MICROGram(s) IV Push every 3 hours PRN Severe Pain (7 - 10)       PROBLEM LIST/ ASSESSMENT:  HEALTH ISSUES - PROBLEM Dx:      ,   Patient is a 71y old  Male who presents with a chief complaint of possible decortication for loculated pleural effusion (05 Oct 2021 16:12)     s/p cardiac surgery                My plan includes :  close hemodynamic, ventilatory and drain monitoring and management per post op routine    Monitor for arrhythmias and monitor parameters for organ perfusion  beta blockade not administered due to hemodynamic instability and bradycardia  monitor neurologic status  Head of the bed should remain elevated to 45 deg .   chest PT and IS will be encouraged  monitor adequacy of oxygenation and ventilation and attempt to wean oxygen  antibiotic regimen will be tailored to the clinical, laboratory and microbiologic data  Nutritional goals will be met using po eventually , ensure adequate caloric intake and montior the same  Stress ulcer and VTE prophylaxis will be achieved    Glycemic control is satisfactory  Electrolytes have been repleted as necessary and wound care has been carried out. Pain control has been achieved.   agressive physical therapy and early mobility and ambulation goals will be met   The family was updated about the course and plan  CRITICAL CARE TIME personally provided by me  in evaluation and management, reassessments, review and interpretation of labs and x-rays, ventilator and hemodynamic management, formulating a plan and coordinating care: ___90____ MIN.  Time does not include procedural time. Time spent was non routine post-operarive caRE and included multiple and repeated evaluations at the bedside  CTICU ATTENDING     					    Lex Sierra MD

## 2021-10-05 NOTE — PROGRESS NOTE ADULT - SUBJECTIVE AND OBJECTIVE BOX
CTICU  CRITICAL  CARE  attending     Hand off received 					   Pertinent clinical, laboratory, radiographic, hemodynamic, echocardiographic, respiratory data, microbiologic data and chart were reviewed and analyzed frequently throughout the course of the day and night  Patient seen and examined with CTS/ SH attending at bedside  Pt is a 71y , Male, HEALTH ISSUES - PROBLEM Dx:      , FAMILY HISTORY:  Family history of stroke (Sibling)    Family history of cerebrovascular accident (CVA) (Sibling, Mother)    Family history of acute myocardial infarction (Sibling)    PAST MEDICAL & SURGICAL HISTORY:  Benign prostatic hypertrophy    HTN (hypertension)    HLD (hyperlipidemia)    DM (diabetes mellitus)    Atherosclerosis of coronary artery  CAD (coronary artery disease)    Type 2 diabetes mellitus  Diabetes    Hyperlipidemia  Hyperlipidemia    Essential hypertension  Hypertension    Pericardial effusion    H/O mitral valve repair    S/P pericardial window creation      Patient is a 71y old  Male who presents with a chief complaint of possible decortication for loculated pleural effusion (05 Oct 2021 19:59)      14 system review limited by mentation and multiorgan morbidity     Vital signs, hemodynamic and respiratory parameters were reviewed from the bedside nursing flowsheet.  ICU Vital Signs Last 24 Hrs  T(C): 36.7 (05 Oct 2021 21:21), Max: 37.6 (05 Oct 2021 16:47)  T(F): 98.1 (05 Oct 2021 21:21), Max: 99.7 (05 Oct 2021 16:47)  HR: 131 (05 Oct 2021 22:00) (67 - 133)  BP: --  BP(mean): --  ABP: 133/80 (05 Oct 2021 22:00) (59/48 - 150/58)  ABP(mean): 97 (05 Oct 2021 22:00) (52 - 97)  RR: 19 (05 Oct 2021 22:00) (18 - 22)  SpO2: 96% (05 Oct 2021 22:00) (96% - 100%)    Adult Advanced Hemodynamics Last 24 Hrs  CVP(mm Hg): 13 (05 Oct 2021 22:00) (9 - 31)  CVP(cm H2O): --  CO: --  CI: --  PA: --  PA(mean): --  PCWP: --  SVR: --  SVRI: --  PVR: --  PVRI: --, ABG - ( 05 Oct 2021 22:27 )  pH, Arterial: 7.44  pH, Blood: x     /  pCO2: 34    /  pO2: 89    / HCO3: 23    / Base Excess: -0.5  /  SaO2: 98.4              Mode: AC/ CMV (Assist Control/ Continuous Mandatory Ventilation)  RR (machine): 16  TV (machine): 500  FiO2: 60  PEEP: 8  ITime: 1  MAP: 13  PIP: 21    Intake and output was reviewed and the fluid balance was calculated  Daily     Daily   I&O's Summary    04 Oct 2021 07:01  -  05 Oct 2021 07:00  --------------------------------------------------------  IN: 1551.1 mL / OUT: 1872 mL / NET: -320.9 mL    05 Oct 2021 07:01  -  05 Oct 2021 22:52  --------------------------------------------------------  IN: 1338.5 mL / OUT: 873 mL / NET: 465.5 mL        All lines and drain sites were assessed  Glycemic trend was reviewedCAPILLARY BLOOD GLUCOSE      POCT Blood Glucose.: 145 mg/dL (04 Oct 2021 21:43)    No acute change in focality  Auscultation of the chest reveals equal bs  Abdomen is soft  Extremities are warm and well perfused  Wounds appear clean and unremarkable  Antibiotics are periop    labs  CBC Full  -  ( 05 Oct 2021 22:28 )  WBC Count : 10.08 K/uL  RBC Count : 3.18 M/uL  Hemoglobin : 9.2 g/dL  Hematocrit : 28.9 %  Platelet Count - Automated : 271 K/uL  Mean Cell Volume : 90.9 fl  Mean Cell Hemoglobin : 28.9 pg  Mean Cell Hemoglobin Concentration : 31.8 gm/dL  Auto Neutrophil # : x  Auto Lymphocyte # : x  Auto Monocyte # : x  Auto Eosinophil # : x  Auto Basophil # : x  Auto Neutrophil % : x  Auto Lymphocyte % : x  Auto Monocyte % : x  Auto Eosinophil % : x  Auto Basophil % : x    10-05    141  |  110<H>  |  21  ----------------------------<  200<H>  4.3   |  23  |  0.91    Ca    8.6      05 Oct 2021 22:28  Phos  2.2     10-05  Mg     2.0     10-05    TPro  5.7<L>  /  Alb  3.5  /  TBili  0.8  /  DBili  x   /  AST  75<H>  /  ALT  82<H>  /  AlkPhos  132<H>  10-05    PT/INR - ( 05 Oct 2021 17:54 )   PT: 15.7 sec;   INR: 1.33          PTT - ( 05 Oct 2021 17:54 )  PTT:34.0 sec  The current medications were reviewed   MEDICATIONS  (STANDING):  aspirin enteric coated 81 milliGRAM(s) Oral daily  chlorhexidine 0.12% Liquid 15 milliLiter(s) Oral Mucosa every 12 hours  chlorhexidine 2% Cloths 1 Application(s) Topical <User Schedule>  dexMEDEtomidine Infusion 0.2 MICROgram(s)/kG/Hr (4.48 mL/Hr) IV Continuous <Continuous>  heparin   Injectable 5000 Unit(s) SubCutaneous every 8 hours  levalbuterol Inhalation 0.63 milliGRAM(s) Inhalation every 6 hours  metoprolol tartrate Injectable 2.5 milliGRAM(s) IV Push every 4 hours  pantoprazole  Injectable 40 milliGRAM(s) IV Push daily  phenylephrine    Infusion 0.1 MICROgram(s)/kG/Min (3.36 mL/Hr) IV Continuous <Continuous>  piperacillin/tazobactam IVPB.. 3.375 Gram(s) IV Intermittent every 6 hours  propofol Infusion 5 MICROgram(s)/kG/Min (2.69 mL/Hr) IV Continuous <Continuous>  sodium chloride 0.9%. 1000 milliLiter(s) (10 mL/Hr) IV Continuous <Continuous>  vancomycin  IVPB 1250 milliGRAM(s) IV Intermittent every 12 hours    MEDICATIONS  (PRN):  fentaNYL    Injectable 25 MICROGram(s) IV Push every 3 hours PRN Severe Pain (7 - 10)       PROBLEM LIST/ ASSESSMENT:  HEALTH ISSUES - PROBLEM Dx:      ,   Patient is a 71y old  Male who presents with a chief complaint of possible decortication for loculated pleural effusion (05 Oct 2021 19:59)     s/p thoracic surgery    Acute hypoxic resp failure on supplemental oxygen   Neuro monitoring closely for focal deficits   Cardiovascular support, map > 80 target with vasopressor drips as needed  Renal function closely monitored for UO and goal net negative status   Early mobility, diet and ambulation and encourage incentive wayne   ID eval with wbc fevers and change lines over 5 days old, dc nye as early as possible , abx as needed  Hem, hh monitoring and golas over 7/22  GI bm eval and monitoring  DVT SUP prophymalis per protocol      My plan includes :  close hemodynamic, ventilatory and drain monitoring and management per post op routine    Monitor for arrhythmias and monitor parameters for organ perfusion  beta blockade not administered due to hemodynamic instability and bradycardia  monitor neurologic status  Head of the bed should remain elevated to 45 deg .   chest PT and IS will be encouraged  monitor adequacy of oxygenation and ventilation and attempt to wean oxygen  antibiotic regimen will be tailored to the clinical, laboratory and microbiologic data  Nutritional goals will be met using po eventually , ensure adequate caloric intake and montior the same  Stress ulcer and VTE prophylaxis will be achieved    Glycemic control is satisfactory  Electrolytes have been repleted as necessary and wound care has been carried out. Pain control has been achieved.   agressive physical therapy and early mobility and ambulation goals will be met   The family was updated about the course and plan  CRITICAL CARE TIME personally provided by me  in evaluation and management, reassessments, review and interpretation of labs and x-rays, ventilator and hemodynamic management, formulating a plan and coordinating care: ___20____ MIN.  Time does not include procedural time. Time spent was non routine post-operarive caRE and included multiple and repeated evaluations at the bedside  CTICU ATTENDING     					    Daniele Gaxiola MD                        	 CTICU  CRITICAL  CARE  attending     Hand off received 					   Pertinent clinical, laboratory, radiographic, hemodynamic, echocardiographic, respiratory data, microbiologic data and chart were reviewed and analyzed frequently throughout the course of the day and night  Patient seen and examined with CTS/ SH attending at bedside  Pt is a 71y , Male, HEALTH ISSUES - PROBLEM Dx:      , FAMILY HISTORY:  Family history of stroke (Sibling)    Family history of cerebrovascular accident (CVA) (Sibling, Mother)    Family history of acute myocardial infarction (Sibling)    PAST MEDICAL & SURGICAL HISTORY:  Benign prostatic hypertrophy    HTN (hypertension)    HLD (hyperlipidemia)    DM (diabetes mellitus)    Atherosclerosis of coronary artery  CAD (coronary artery disease)    Type 2 diabetes mellitus  Diabetes    Hyperlipidemia  Hyperlipidemia    Essential hypertension  Hypertension    Pericardial effusion    H/O mitral valve repair    S/P pericardial window creation      Patient is a 71y old  Male who presents with a chief complaint of possible decortication for loculated pleural effusion (05 Oct 2021 19:59)      14 system review limited by mentation and multiorgan morbidity     Vital signs, hemodynamic and respiratory parameters were reviewed from the bedside nursing flowsheet.  ICU Vital Signs Last 24 Hrs  T(C): 36.7 (05 Oct 2021 21:21), Max: 37.6 (05 Oct 2021 16:47)  T(F): 98.1 (05 Oct 2021 21:21), Max: 99.7 (05 Oct 2021 16:47)  HR: 131 (05 Oct 2021 22:00) (67 - 133)  BP: --  BP(mean): --  ABP: 133/80 (05 Oct 2021 22:00) (59/48 - 150/58)  ABP(mean): 97 (05 Oct 2021 22:00) (52 - 97)  RR: 19 (05 Oct 2021 22:00) (18 - 22)  SpO2: 96% (05 Oct 2021 22:00) (96% - 100%)    Adult Advanced Hemodynamics Last 24 Hrs  CVP(mm Hg): 13 (05 Oct 2021 22:00) (9 - 31)  CVP(cm H2O): --  CO: --  CI: --  PA: --  PA(mean): --  PCWP: --  SVR: --  SVRI: --  PVR: --  PVRI: --, ABG - ( 05 Oct 2021 22:27 )  pH, Arterial: 7.44  pH, Blood: x     /  pCO2: 34    /  pO2: 89    / HCO3: 23    / Base Excess: -0.5  /  SaO2: 98.4              Mode: AC/ CMV (Assist Control/ Continuous Mandatory Ventilation)  RR (machine): 16  TV (machine): 500  FiO2: 60  PEEP: 8  ITime: 1  MAP: 13  PIP: 21    Intake and output was reviewed and the fluid balance was calculated  Daily     Daily   I&O's Summary    04 Oct 2021 07:01  -  05 Oct 2021 07:00  --------------------------------------------------------  IN: 1551.1 mL / OUT: 1872 mL / NET: -320.9 mL    05 Oct 2021 07:01  -  05 Oct 2021 22:52  --------------------------------------------------------  IN: 1338.5 mL / OUT: 873 mL / NET: 465.5 mL        All lines and drain sites were assessed  Glycemic trend was reviewedCAPILLARY BLOOD GLUCOSE      POCT Blood Glucose.: 145 mg/dL (04 Oct 2021 21:43)    No acute change in focality  Auscultation of the chest reveals equal bs  Abdomen is soft  Extremities are warm and well perfused  Wounds appear clean and unremarkable  Antibiotics are periop    labs  CBC Full  -  ( 05 Oct 2021 22:28 )  WBC Count : 10.08 K/uL  RBC Count : 3.18 M/uL  Hemoglobin : 9.2 g/dL  Hematocrit : 28.9 %  Platelet Count - Automated : 271 K/uL  Mean Cell Volume : 90.9 fl  Mean Cell Hemoglobin : 28.9 pg  Mean Cell Hemoglobin Concentration : 31.8 gm/dL  Auto Neutrophil # : x  Auto Lymphocyte # : x  Auto Monocyte # : x  Auto Eosinophil # : x  Auto Basophil # : x  Auto Neutrophil % : x  Auto Lymphocyte % : x  Auto Monocyte % : x  Auto Eosinophil % : x  Auto Basophil % : x    10-05    141  |  110<H>  |  21  ----------------------------<  200<H>  4.3   |  23  |  0.91    Ca    8.6      05 Oct 2021 22:28  Phos  2.2     10-05  Mg     2.0     10-05    TPro  5.7<L>  /  Alb  3.5  /  TBili  0.8  /  DBili  x   /  AST  75<H>  /  ALT  82<H>  /  AlkPhos  132<H>  10-05    PT/INR - ( 05 Oct 2021 17:54 )   PT: 15.7 sec;   INR: 1.33          PTT - ( 05 Oct 2021 17:54 )  PTT:34.0 sec  The current medications were reviewed   MEDICATIONS  (STANDING):  aspirin enteric coated 81 milliGRAM(s) Oral daily  chlorhexidine 0.12% Liquid 15 milliLiter(s) Oral Mucosa every 12 hours  chlorhexidine 2% Cloths 1 Application(s) Topical <User Schedule>  dexMEDEtomidine Infusion 0.2 MICROgram(s)/kG/Hr (4.48 mL/Hr) IV Continuous <Continuous>  heparin   Injectable 5000 Unit(s) SubCutaneous every 8 hours  levalbuterol Inhalation 0.63 milliGRAM(s) Inhalation every 6 hours  metoprolol tartrate Injectable 2.5 milliGRAM(s) IV Push every 4 hours  pantoprazole  Injectable 40 milliGRAM(s) IV Push daily  phenylephrine    Infusion 0.1 MICROgram(s)/kG/Min (3.36 mL/Hr) IV Continuous <Continuous>  piperacillin/tazobactam IVPB.. 3.375 Gram(s) IV Intermittent every 6 hours  propofol Infusion 5 MICROgram(s)/kG/Min (2.69 mL/Hr) IV Continuous <Continuous>  sodium chloride 0.9%. 1000 milliLiter(s) (10 mL/Hr) IV Continuous <Continuous>  vancomycin  IVPB 1250 milliGRAM(s) IV Intermittent every 12 hours    MEDICATIONS  (PRN):  fentaNYL    Injectable 25 MICROGram(s) IV Push every 3 hours PRN Severe Pain (7 - 10)       PROBLEM LIST/ ASSESSMENT:  HEALTH ISSUES - PROBLEM Dx:      ,   Patient is a 71y old  Male who presents with a chief complaint of possible decortication for loculated pleural effusion (05 Oct 2021 19:59)     s/p thoracic surgery    Acute hypoxic resp failure on supplemental oxygen   Neuro monitoring closely for focal deficits   Cardiovascular support, map > 80 target with vasopressor drips as needed  Renal function closely monitored for UO and goal net negative status   Early mobility, diet and ambulation and encourage incentive wayne   ID eval with wbc fevers and change lines over 5 days old, dc nye as early as possible , abx as needed  Hem, hh monitoring and golas over 7/22  GI bm eval and monitoring  DVT SUP prophymalis per protocol      My plan includes :  close hemodynamic, ventilatory and drain monitoring and management per post op routine    Bronch showed purulent secretions, vanc zosyn started, fu bal cx  afib rvr, increase beta blcokers, load dig    Monitor for arrhythmias and monitor parameters for organ perfusion  beta blockade not administered due to hemodynamic instability and bradycardia  monitor neurologic status  Head of the bed should remain elevated to 45 deg .   chest PT and IS will be encouraged  monitor adequacy of oxygenation and ventilation and attempt to wean oxygen  antibiotic regimen will be tailored to the clinical, laboratory and microbiologic data  Nutritional goals will be met using po eventually , ensure adequate caloric intake and montior the same  Stress ulcer and VTE prophylaxis will be achieved    Glycemic control is satisfactory  Electrolytes have been repleted as necessary and wound care has been carried out. Pain control has been achieved.   agressive physical therapy and early mobility and ambulation goals will be met   The family was updated about the course and plan  CRITICAL CARE TIME personally provided by me  in evaluation and management, reassessments, review and interpretation of labs and x-rays, ventilator and hemodynamic management, formulating a plan and coordinating care: ___20____ MIN.  Time does not include procedural time. Time spent was non routine post-operarive caRE and included multiple and repeated evaluations at the bedside  CTICU ATTENDING     					    Daniele Gaxiola MD

## 2021-10-06 LAB
ALBUMIN SERPL ELPH-MCNC: 3.3 G/DL — SIGNIFICANT CHANGE UP (ref 3.3–5)
ALBUMIN SERPL ELPH-MCNC: 3.3 G/DL — SIGNIFICANT CHANGE UP (ref 3.3–5)
ALBUMIN SERPL ELPH-MCNC: 3.4 G/DL — SIGNIFICANT CHANGE UP (ref 3.3–5)
ALP SERPL-CCNC: 123 U/L — HIGH (ref 40–120)
ALP SERPL-CCNC: 133 U/L — HIGH (ref 40–120)
ALP SERPL-CCNC: 138 U/L — HIGH (ref 40–120)
ALT FLD-CCNC: 60 U/L — HIGH (ref 10–45)
ALT FLD-CCNC: 60 U/L — HIGH (ref 10–45)
ALT FLD-CCNC: 70 U/L — HIGH (ref 10–45)
ANION GAP SERPL CALC-SCNC: 7 MMOL/L — SIGNIFICANT CHANGE UP (ref 5–17)
ANION GAP SERPL CALC-SCNC: 8 MMOL/L — SIGNIFICANT CHANGE UP (ref 5–17)
ANION GAP SERPL CALC-SCNC: 9 MMOL/L — SIGNIFICANT CHANGE UP (ref 5–17)
APTT BLD: 27.6 SEC — SIGNIFICANT CHANGE UP (ref 27.5–35.5)
APTT BLD: 29.1 SEC — SIGNIFICANT CHANGE UP (ref 27.5–35.5)
APTT BLD: 32.5 SEC — SIGNIFICANT CHANGE UP (ref 27.5–35.5)
AST SERPL-CCNC: 53 U/L — HIGH (ref 10–40)
AST SERPL-CCNC: 54 U/L — HIGH (ref 10–40)
AST SERPL-CCNC: 56 U/L — HIGH (ref 10–40)
BILIRUB SERPL-MCNC: 0.8 MG/DL — SIGNIFICANT CHANGE UP (ref 0.2–1.2)
BILIRUB SERPL-MCNC: 1 MG/DL — SIGNIFICANT CHANGE UP (ref 0.2–1.2)
BILIRUB SERPL-MCNC: 1.3 MG/DL — HIGH (ref 0.2–1.2)
BUN SERPL-MCNC: 17 MG/DL — SIGNIFICANT CHANGE UP (ref 7–23)
BUN SERPL-MCNC: 18 MG/DL — SIGNIFICANT CHANGE UP (ref 7–23)
BUN SERPL-MCNC: 20 MG/DL — SIGNIFICANT CHANGE UP (ref 7–23)
CALCIUM SERPL-MCNC: 8.4 MG/DL — SIGNIFICANT CHANGE UP (ref 8.4–10.5)
CALCIUM SERPL-MCNC: 8.7 MG/DL — SIGNIFICANT CHANGE UP (ref 8.4–10.5)
CALCIUM SERPL-MCNC: 8.8 MG/DL — SIGNIFICANT CHANGE UP (ref 8.4–10.5)
CHLORIDE SERPL-SCNC: 104 MMOL/L — SIGNIFICANT CHANGE UP (ref 96–108)
CHLORIDE SERPL-SCNC: 105 MMOL/L — SIGNIFICANT CHANGE UP (ref 96–108)
CHLORIDE SERPL-SCNC: 110 MMOL/L — HIGH (ref 96–108)
CO2 SERPL-SCNC: 25 MMOL/L — SIGNIFICANT CHANGE UP (ref 22–31)
CO2 SERPL-SCNC: 28 MMOL/L — SIGNIFICANT CHANGE UP (ref 22–31)
CO2 SERPL-SCNC: 29 MMOL/L — SIGNIFICANT CHANGE UP (ref 22–31)
CREAT SERPL-MCNC: 0.94 MG/DL — SIGNIFICANT CHANGE UP (ref 0.5–1.3)
CREAT SERPL-MCNC: 0.96 MG/DL — SIGNIFICANT CHANGE UP (ref 0.5–1.3)
CREAT SERPL-MCNC: 0.96 MG/DL — SIGNIFICANT CHANGE UP (ref 0.5–1.3)
GAS PNL BLDA: SIGNIFICANT CHANGE UP
GLUCOSE BLDC GLUCOMTR-MCNC: 127 MG/DL — HIGH (ref 70–99)
GLUCOSE BLDC GLUCOMTR-MCNC: 131 MG/DL — HIGH (ref 70–99)
GLUCOSE BLDC GLUCOMTR-MCNC: 146 MG/DL — HIGH (ref 70–99)
GLUCOSE BLDC GLUCOMTR-MCNC: 173 MG/DL — HIGH (ref 70–99)
GLUCOSE BLDC GLUCOMTR-MCNC: 181 MG/DL — HIGH (ref 70–99)
GLUCOSE BLDC GLUCOMTR-MCNC: 205 MG/DL — HIGH (ref 70–99)
GLUCOSE SERPL-MCNC: 180 MG/DL — HIGH (ref 70–99)
GLUCOSE SERPL-MCNC: 187 MG/DL — HIGH (ref 70–99)
GLUCOSE SERPL-MCNC: 213 MG/DL — HIGH (ref 70–99)
GRAM STN FLD: SIGNIFICANT CHANGE UP
HCT VFR BLD CALC: 28.9 % — LOW (ref 39–50)
HCT VFR BLD CALC: 29.6 % — LOW (ref 39–50)
HCT VFR BLD CALC: 30.8 % — LOW (ref 39–50)
HGB BLD-MCNC: 9.1 G/DL — LOW (ref 13–17)
HGB BLD-MCNC: 9.4 G/DL — LOW (ref 13–17)
HGB BLD-MCNC: 9.7 G/DL — LOW (ref 13–17)
INR BLD: 1.22 — HIGH (ref 0.88–1.16)
INR BLD: 1.24 — HIGH (ref 0.88–1.16)
INR BLD: 1.29 — HIGH (ref 0.88–1.16)
MAGNESIUM SERPL-MCNC: 1.9 MG/DL — SIGNIFICANT CHANGE UP (ref 1.6–2.6)
MAGNESIUM SERPL-MCNC: 1.9 MG/DL — SIGNIFICANT CHANGE UP (ref 1.6–2.6)
MAGNESIUM SERPL-MCNC: 2 MG/DL — SIGNIFICANT CHANGE UP (ref 1.6–2.6)
MCHC RBC-ENTMCNC: 28.4 PG — SIGNIFICANT CHANGE UP (ref 27–34)
MCHC RBC-ENTMCNC: 28.4 PG — SIGNIFICANT CHANGE UP (ref 27–34)
MCHC RBC-ENTMCNC: 28.5 PG — SIGNIFICANT CHANGE UP (ref 27–34)
MCHC RBC-ENTMCNC: 31.5 GM/DL — LOW (ref 32–36)
MCHC RBC-ENTMCNC: 31.5 GM/DL — LOW (ref 32–36)
MCHC RBC-ENTMCNC: 31.8 GM/DL — LOW (ref 32–36)
MCV RBC AUTO: 89.7 FL — SIGNIFICANT CHANGE UP (ref 80–100)
MCV RBC AUTO: 90.1 FL — SIGNIFICANT CHANGE UP (ref 80–100)
MCV RBC AUTO: 90.3 FL — SIGNIFICANT CHANGE UP (ref 80–100)
NRBC # BLD: 0 /100 WBCS — SIGNIFICANT CHANGE UP (ref 0–0)
PHOSPHATE SERPL-MCNC: 2.1 MG/DL — LOW (ref 2.5–4.5)
PHOSPHATE SERPL-MCNC: 2.6 MG/DL — SIGNIFICANT CHANGE UP (ref 2.5–4.5)
PHOSPHATE SERPL-MCNC: 2.7 MG/DL — SIGNIFICANT CHANGE UP (ref 2.5–4.5)
PLATELET # BLD AUTO: 249 K/UL — SIGNIFICANT CHANGE UP (ref 150–400)
PLATELET # BLD AUTO: 283 K/UL — SIGNIFICANT CHANGE UP (ref 150–400)
PLATELET # BLD AUTO: 283 K/UL — SIGNIFICANT CHANGE UP (ref 150–400)
POTASSIUM SERPL-MCNC: 3.3 MMOL/L — LOW (ref 3.5–5.3)
POTASSIUM SERPL-MCNC: 3.7 MMOL/L — SIGNIFICANT CHANGE UP (ref 3.5–5.3)
POTASSIUM SERPL-MCNC: 3.9 MMOL/L — SIGNIFICANT CHANGE UP (ref 3.5–5.3)
POTASSIUM SERPL-SCNC: 3.3 MMOL/L — LOW (ref 3.5–5.3)
POTASSIUM SERPL-SCNC: 3.7 MMOL/L — SIGNIFICANT CHANGE UP (ref 3.5–5.3)
POTASSIUM SERPL-SCNC: 3.9 MMOL/L — SIGNIFICANT CHANGE UP (ref 3.5–5.3)
PROT SERPL-MCNC: 5.5 G/DL — LOW (ref 6–8.3)
PROT SERPL-MCNC: 5.7 G/DL — LOW (ref 6–8.3)
PROT SERPL-MCNC: 6 G/DL — SIGNIFICANT CHANGE UP (ref 6–8.3)
PROTHROM AB SERPL-ACNC: 14.5 SEC — HIGH (ref 10.6–13.6)
PROTHROM AB SERPL-ACNC: 14.7 SEC — HIGH (ref 10.6–13.6)
PROTHROM AB SERPL-ACNC: 15.3 SEC — HIGH (ref 10.6–13.6)
RBC # BLD: 3.2 M/UL — LOW (ref 4.2–5.8)
RBC # BLD: 3.3 M/UL — LOW (ref 4.2–5.8)
RBC # BLD: 3.42 M/UL — LOW (ref 4.2–5.8)
RBC # FLD: 14.1 % — SIGNIFICANT CHANGE UP (ref 10.3–14.5)
RBC # FLD: 14.2 % — SIGNIFICANT CHANGE UP (ref 10.3–14.5)
RBC # FLD: 14.3 % — SIGNIFICANT CHANGE UP (ref 10.3–14.5)
SODIUM SERPL-SCNC: 141 MMOL/L — SIGNIFICANT CHANGE UP (ref 135–145)
SODIUM SERPL-SCNC: 142 MMOL/L — SIGNIFICANT CHANGE UP (ref 135–145)
SODIUM SERPL-SCNC: 142 MMOL/L — SIGNIFICANT CHANGE UP (ref 135–145)
SPECIMEN SOURCE: SIGNIFICANT CHANGE UP
VANCOMYCIN TROUGH SERPL-MCNC: 12.3 UG/ML — SIGNIFICANT CHANGE UP (ref 10–20)
WBC # BLD: 10.68 K/UL — HIGH (ref 3.8–10.5)
WBC # BLD: 8.98 K/UL — SIGNIFICANT CHANGE UP (ref 3.8–10.5)
WBC # BLD: 9.36 K/UL — SIGNIFICANT CHANGE UP (ref 3.8–10.5)
WBC # FLD AUTO: 10.68 K/UL — HIGH (ref 3.8–10.5)
WBC # FLD AUTO: 8.98 K/UL — SIGNIFICANT CHANGE UP (ref 3.8–10.5)
WBC # FLD AUTO: 9.36 K/UL — SIGNIFICANT CHANGE UP (ref 3.8–10.5)

## 2021-10-06 PROCEDURE — 99292 CRITICAL CARE ADDL 30 MIN: CPT

## 2021-10-06 PROCEDURE — 99291 CRITICAL CARE FIRST HOUR: CPT

## 2021-10-06 PROCEDURE — 71045 X-RAY EXAM CHEST 1 VIEW: CPT | Mod: 26

## 2021-10-06 RX ORDER — DILTIAZEM HCL 120 MG
10 CAPSULE, EXT RELEASE 24 HR ORAL ONCE
Refills: 0 | Status: COMPLETED | OUTPATIENT
Start: 2021-10-06 | End: 2021-10-06

## 2021-10-06 RX ORDER — POTASSIUM CHLORIDE 20 MEQ
20 PACKET (EA) ORAL ONCE
Refills: 0 | Status: COMPLETED | OUTPATIENT
Start: 2021-10-06 | End: 2021-10-06

## 2021-10-06 RX ORDER — FUROSEMIDE 40 MG
20 TABLET ORAL ONCE
Refills: 0 | Status: COMPLETED | OUTPATIENT
Start: 2021-10-06 | End: 2021-10-06

## 2021-10-06 RX ORDER — FUROSEMIDE 40 MG
40 TABLET ORAL ONCE
Refills: 0 | Status: COMPLETED | OUTPATIENT
Start: 2021-10-06 | End: 2021-10-06

## 2021-10-06 RX ORDER — METOPROLOL TARTRATE 50 MG
5 TABLET ORAL EVERY 6 HOURS
Refills: 0 | Status: DISCONTINUED | OUTPATIENT
Start: 2021-10-06 | End: 2021-10-08

## 2021-10-06 RX ORDER — VANCOMYCIN HCL 1 G
1000 VIAL (EA) INTRAVENOUS EVERY 12 HOURS
Refills: 0 | Status: DISCONTINUED | OUTPATIENT
Start: 2021-10-07 | End: 2021-10-07

## 2021-10-06 RX ORDER — VANCOMYCIN HCL 1 G
1000 VIAL (EA) INTRAVENOUS ONCE
Refills: 0 | Status: COMPLETED | OUTPATIENT
Start: 2021-10-06 | End: 2021-10-06

## 2021-10-06 RX ORDER — VANCOMYCIN HCL 1 G
VIAL (EA) INTRAVENOUS
Refills: 0 | Status: DISCONTINUED | OUTPATIENT
Start: 2021-10-06 | End: 2021-10-07

## 2021-10-06 RX ORDER — DILTIAZEM HCL 120 MG
5 CAPSULE, EXT RELEASE 24 HR ORAL
Qty: 125 | Refills: 0 | Status: DISCONTINUED | OUTPATIENT
Start: 2021-10-06 | End: 2021-10-07

## 2021-10-06 RX ADMIN — HEPARIN SODIUM 5000 UNIT(S): 5000 INJECTION INTRAVENOUS; SUBCUTANEOUS at 21:31

## 2021-10-06 RX ADMIN — Medication 250 MILLIGRAM(S): at 18:49

## 2021-10-06 RX ADMIN — Medication 81 MILLIGRAM(S): at 11:19

## 2021-10-06 RX ADMIN — PIPERACILLIN AND TAZOBACTAM 200 GRAM(S): 4; .5 INJECTION, POWDER, LYOPHILIZED, FOR SOLUTION INTRAVENOUS at 23:43

## 2021-10-06 RX ADMIN — Medication 100 MILLIEQUIVALENT(S): at 18:50

## 2021-10-06 RX ADMIN — Medication 2.5 MILLIGRAM(S): at 05:34

## 2021-10-06 RX ADMIN — HEPARIN SODIUM 5000 UNIT(S): 5000 INJECTION INTRAVENOUS; SUBCUTANEOUS at 05:34

## 2021-10-06 RX ADMIN — PIPERACILLIN AND TAZOBACTAM 200 GRAM(S): 4; .5 INJECTION, POWDER, LYOPHILIZED, FOR SOLUTION INTRAVENOUS at 04:06

## 2021-10-06 RX ADMIN — Medication 2.5 MILLIGRAM(S): at 17:16

## 2021-10-06 RX ADMIN — LEVALBUTEROL 0.63 MILLIGRAM(S): 1.25 SOLUTION, CONCENTRATE RESPIRATORY (INHALATION) at 19:44

## 2021-10-06 RX ADMIN — Medication 2: at 23:43

## 2021-10-06 RX ADMIN — PIPERACILLIN AND TAZOBACTAM 200 GRAM(S): 4; .5 INJECTION, POWDER, LYOPHILIZED, FOR SOLUTION INTRAVENOUS at 11:21

## 2021-10-06 RX ADMIN — Medication 5 MILLIGRAM(S): at 21:13

## 2021-10-06 RX ADMIN — Medication 20 MILLIGRAM(S): at 05:36

## 2021-10-06 RX ADMIN — LEVALBUTEROL 0.63 MILLIGRAM(S): 1.25 SOLUTION, CONCENTRATE RESPIRATORY (INHALATION) at 05:15

## 2021-10-06 RX ADMIN — Medication 2: at 05:36

## 2021-10-06 RX ADMIN — LEVALBUTEROL 0.63 MILLIGRAM(S): 1.25 SOLUTION, CONCENTRATE RESPIRATORY (INHALATION) at 11:24

## 2021-10-06 RX ADMIN — DEXMEDETOMIDINE HYDROCHLORIDE IN 0.9% SODIUM CHLORIDE 4.48 MICROGRAM(S)/KG/HR: 4 INJECTION INTRAVENOUS at 05:34

## 2021-10-06 RX ADMIN — HEPARIN SODIUM 5000 UNIT(S): 5000 INJECTION INTRAVENOUS; SUBCUTANEOUS at 14:13

## 2021-10-06 RX ADMIN — Medication 2.5 MILLIGRAM(S): at 14:06

## 2021-10-06 RX ADMIN — Medication 2.5 MILLIGRAM(S): at 10:35

## 2021-10-06 RX ADMIN — Medication 40 MILLIGRAM(S): at 12:45

## 2021-10-06 RX ADMIN — PANTOPRAZOLE SODIUM 40 MILLIGRAM(S): 20 TABLET, DELAYED RELEASE ORAL at 11:19

## 2021-10-06 RX ADMIN — LEVALBUTEROL 0.63 MILLIGRAM(S): 1.25 SOLUTION, CONCENTRATE RESPIRATORY (INHALATION) at 23:57

## 2021-10-06 RX ADMIN — Medication 20 MILLIEQUIVALENT(S): at 04:06

## 2021-10-06 RX ADMIN — CHLORHEXIDINE GLUCONATE 1 APPLICATION(S): 213 SOLUTION TOPICAL at 06:00

## 2021-10-06 RX ADMIN — PIPERACILLIN AND TAZOBACTAM 200 GRAM(S): 4; .5 INJECTION, POWDER, LYOPHILIZED, FOR SOLUTION INTRAVENOUS at 17:15

## 2021-10-06 RX ADMIN — Medication 10 MILLIGRAM(S): at 22:25

## 2021-10-06 RX ADMIN — Medication 166.67 MILLIGRAM(S): at 06:37

## 2021-10-06 RX ADMIN — CHLORHEXIDINE GLUCONATE 15 MILLILITER(S): 213 SOLUTION TOPICAL at 05:34

## 2021-10-06 RX ADMIN — Medication 10 MILLIGRAM(S): at 18:42

## 2021-10-06 RX ADMIN — Medication 5 MILLIGRAM(S): at 18:45

## 2021-10-06 RX ADMIN — LEVALBUTEROL 0.63 MILLIGRAM(S): 1.25 SOLUTION, CONCENTRATE RESPIRATORY (INHALATION) at 01:08

## 2021-10-06 NOTE — PROGRESS NOTE ADULT - SUBJECTIVE AND OBJECTIVE BOX
CTICU  CRITICAL  CARE  ATTENDING:   				   Pertinent clinical, laboratory, radiographic, hemodynamic, echocardiographic, respiratory data, microbiologic data and chart were reviewed and analyzed frequently throughout the course of the day and night    Patient seen and examined with CTS/ SH attending at bedside    Pt is a 71y Male admitted for vats     HEALTH ISSUES - PROBLEM Dx:         FAMILY HISTORY:  Family history of stroke (Sibling)    Family history of cerebrovascular accident (CVA) (Sibling, Mother)    Family history of acute myocardial infarction (Sibling)    PAST MEDICAL & SURGICAL HISTORY:  Benign prostatic hypertrophy    HTN (hypertension)    HLD (hyperlipidemia)    DM (diabetes mellitus)    Atherosclerosis of coronary artery  CAD (coronary artery disease)    Type 2 diabetes mellitus  Diabetes    Hyperlipidemia  Hyperlipidemia    Essential hypertension  Hypertension    Pericardial effusion    H/O mitral valve repair    S/P pericardial window creation        14 system review was unremarkable      Vital signs, hemodynamic and respiratory parameters were reviewed from the bedside nursing flowsheet.  ICU Vital Signs Last 24 Hrs  T(C): 36.2 (06 Oct 2021 21:20), Max: 36.7 (06 Oct 2021 09:00)  T(F): 97.2 (06 Oct 2021 21:20), Max: 98 (06 Oct 2021 09:00)  HR: 71 (06 Oct 2021 22:45) (67 - 144)  BP: 136/69 (06 Oct 2021 18:00) (136/69 - 136/69)  BP(mean): 97 (06 Oct 2021 18:00) (97 - 97)  ABP: 154/64 (06 Oct 2021 22:45) (93/73 - 168/56)  ABP(mean): 90 (06 Oct 2021 22:45) (71 - 99)  RR: 18 (06 Oct 2021 22:45) (16 - 24)  SpO2: 95% (06 Oct 2021 22:45) (94% - 100%)    Adult Advanced Hemodynamics Last 24 Hrs  CVP(mm Hg): 5 (06 Oct 2021 22:45) (5 - 244)  CVP(cm H2O): --  CO: --  CI: --  PA: --  PA(mean): --  PCWP: --  SVR: --  SVRI: --  PVR: --  PVRI: --, ABG - ( 06 Oct 2021 17:26 )  pH, Arterial: 7.48  pH, Blood: x     /  pCO2: 38    /  pO2: 92    / HCO3: 28    / Base Excess: 4.6   /  SaO2: 97.8              Mode: CPAP with PS  FiO2: 40  PEEP: 8  PS: 12  MAP: 12  PIP: 21    Intake and output was reviewed and the fluid balance was calculated  Daily     Daily   I&O's Summary    05 Oct 2021 07:01  -  06 Oct 2021 07:00  --------------------------------------------------------  IN: 1785.5 mL / OUT: 2703 mL / NET: -917.5 mL    06 Oct 2021 07:01  -  06 Oct 2021 23:15  --------------------------------------------------------  IN: 978 mL / OUT: 2075 mL / NET: -1097 mL        All lines and drain sites were assessed  Glycemic trend [facility]      POCT Blood Glucose.: 173 mg/dL (06 Oct 2021 23:10)        Exam:   Gen: NAD   Neuro: Mental status  Lungs: Auscultation of the chest reveals equal bs  Abd: soft, nt/nd  Ext: warm and well perfused  Wound: appears clean and unremarkable  Antibiotics are periop      labs  CBC Full  -  ( 06 Oct 2021 15:03 )  WBC Count : 10.68 K/uL  RBC Count : 3.42 M/uL  Hemoglobin : 9.7 g/dL  Hematocrit : 30.8 %  Platelet Count - Automated : 283 K/uL  Mean Cell Volume : 90.1 fl  Mean Cell Hemoglobin : 28.4 pg  Mean Cell Hemoglobin Concentration : 31.5 gm/dL  Auto Neutrophil # : x  Auto Lymphocyte # : x  Auto Monocyte # : x  Auto Eosinophil # : x  Auto Basophil # : x  Auto Neutrophil % : x  Auto Lymphocyte % : x  Auto Monocyte % : x  Auto Eosinophil % : x  Auto Basophil % : x    10-06    142  |  105  |  18  ----------------------------<  213<H>  3.7   |  28  |  0.96    Ca    8.8      06 Oct 2021 15:03  Phos  2.7     10-06  Mg     1.9     10-06    TPro  6.0  /  Alb  3.4  /  TBili  1.3<H>  /  DBili  x   /  AST  54<H>  /  ALT  60<H>  /  AlkPhos  133<H>  10-06    PT/INR - ( 06 Oct 2021 15:03 )   PT: 14.5 sec;   INR: 1.22          PTT - ( 06 Oct 2021 15:03 )  PTT:29.1 sec    The current medications were reviewed   MEDICATIONS  (STANDING):  aspirin enteric coated 81 milliGRAM(s) Oral daily  chlorhexidine 2% Cloths 1 Application(s) Topical <User Schedule>  dexMEDEtomidine Infusion 0.2 MICROgram(s)/kG/Hr (4.48 mL/Hr) IV Continuous <Continuous>  dextrose 40% Gel 15 Gram(s) Oral once  dextrose 5%. 1000 milliLiter(s) (50 mL/Hr) IV Continuous <Continuous>  dextrose 5%. 1000 milliLiter(s) (100 mL/Hr) IV Continuous <Continuous>  dextrose 50% Injectable 25 Gram(s) IV Push once  dextrose 50% Injectable 12.5 Gram(s) IV Push once  dextrose 50% Injectable 25 Gram(s) IV Push once  diltiazem Infusion 5 mG/Hr (5 mL/Hr) IV Continuous <Continuous>  glucagon  Injectable 1 milliGRAM(s) IntraMuscular once  heparin   Injectable 5000 Unit(s) SubCutaneous every 8 hours  insulin lispro (ADMELOG) corrective regimen sliding scale   SubCutaneous every 6 hours  levalbuterol Inhalation 0.63 milliGRAM(s) Inhalation every 6 hours  metoprolol tartrate Injectable 5 milliGRAM(s) IV Push every 6 hours  pantoprazole  Injectable 40 milliGRAM(s) IV Push daily  piperacillin/tazobactam IVPB.. 3.375 Gram(s) IV Intermittent every 6 hours  sodium chloride 0.9%. 1000 milliLiter(s) (10 mL/Hr) IV Continuous <Continuous>  vancomycin  IVPB        MEDICATIONS  (PRN):       PROBLEM LIST/ ASSESSMENT:  HEALTH ISSUES - PROBLEM Dx:         Patient is a 71y old  Male who presents with a chief complaint of possible decortication for loculated pleural effusion (06 Oct 2021 19:45) s/p vats        My plan includes :  -Close hemodynamic, ventilatory and drain monitoring and management per post op routine  -Monitor for arrhythmias and monitor parameters for organ perfusion  -Monitor neurologic status  -Head of the bed should remain elevated to 45 deg .   -Chest PT and IS will be encouraged  -Monitor adequacy of oxygenation and ventilation and attempt to wean oxygen  -Nutritional goals will be met using po eventually , ensure adequate caloric intake and monitor the same  -Stress ulcer and VTE prophylaxis will be achieved    -Glycemic control is satisfactory  -Electrolytes have been repleated as necessary and wound care has been carried out. Pain control has been achieved.   -Agressive physical therapy and early mobility and ambulation goals will be met   The family was updated about the course and plan    CRITICAL CARE TIME SPENT in evaluation and management, reassessments, review and interpretation of labs and x-rays, ventilator and hemodynamic management, formulating a plan and coordinating care: ___30____ MIN.  Time does not include procedural time.      CTICU ATTENDING:      		  Rahul Starr MD

## 2021-10-06 NOTE — PHYSICAL THERAPY INITIAL EVALUATION ADULT - GAIT DEVIATIONS NOTED, PT EVAL
crouch like gait, max VCs for full knee extension and hip extension, sig SOB, tachy 140-145 bpm throughout, further ambulation not appropriate, B knee buckling, minimal step clearance, shuffling gait

## 2021-10-06 NOTE — PHYSICAL THERAPY INITIAL EVALUATION ADULT - SITTING BALANCE: STATIC
Solumedrol injection given in clinic for swelling    Benadryl given in clinic for itching      Prednisone 3 tabs daily x 3 days then 2 tabs daily x 3 days then 1 tab daily x 3 days - take with food in morning    Benadryl every 8 hours as needed for itching    Zyrtec 2 tabs daily in the morning as needed for itching    Zantac 150 mg daily for itching    Use calamine to dry up weeping lesions    Wash bedding, pillow cases, towels, etc daily    Follow up if worsening or concerns      Managing a Poison Ivy, Poison Oak, or Poison Sumac Reaction  If you come in contact with urushiol    If you think you may have come in contact with the sap oil (called urushiol) contained in poison ivy, poison oak, or poison sumac plants, wash the affected part of your skin. Do this within 15 minutes after contact. Use water or preferably, soap and water.  Undress, and wash your clothes and gear as soon as you can. Be sure to wash any pet that was with you. Taking these steps can help prevent spreading sap oil to someone else. If you have a rash, but are not sure if it is from one of these plants, see your healthcare provider.  To soothe the itching  Your skin may react to poison oak, poison ivy, and poison sumac within hours to a few days after contact. Once you have come into contact with these plants, you can t stop the reaction. But you can take these steps to soothe the itching:    Don t scratch or scrub your rash. Not even if the itching is severe. Scratching can lead to infection.    Bathe in lukewarm (not hot) water. Or take short cool showers to relieve the itching. For a more soothing bath, add oatmeal to the water.    Use antihistamines that are taken by mouth. These include diphenhydramine. You can buy these at the pharmacy. Talk to your healthcare provider or pharmacist for more information on oral antihistamines.    Use over-the-counter treatments on your skin. These include cortisone and calamine lotion.  How your skin may  react  A mild rash may become red, swollen, and itchy. The rash may form a line on your skin where you brushed against the plant. If you have a severe rash, your itching may worsen. And your rash may blister and ooze. If this happens, seek medical care. The fluid from your blisters will not make your rash spread. With or without medical care, your rash may last up to 3 weeks. In the future, try to avoid coming in contact with these plants.  When to call your healthcare provider  Call your healthcare provider if:    Your rash is severe    The rash spreads beyond the exposed part of your body or affects your face.    The rash does not clear up within a few weeks  You may be given medicine to take by mouth or apply directly on the skin.  Call 911  Call 911 if you have any of the following:    Trouble breathing or swallowing    Any significant swelling  Date Last Reviewed: 3/1/2017    1769-5736 [employer]. [address] [place] [address]. All rights reserved. This information is not intended as a substitute for professional medical care. Always follow your healthcare professional's instructions.        General Allergic Reactions  An allergic reaction is a set of symptoms caused by an allergen. An allergen is something that causes a person s immune system to react. When a person comes in contact with an allergen, it causes the body to release chemicals. These include the chemical histamine. Histamine causes swelling and itching. It may affect the entire body. This is called a general allergic reaction. Often symptoms affect only 1 part of the body. This is called a local allergic reaction.  You are having an allergic reaction. Almost anything can cause one. Different people are allergic to different things. It is usually something that you ate or swallowed, came into contact with by getting or putting it on your skin or clothes, or something you breathed in the air. This can be very annoying and  sometimes scary.  Most of us think of allergic reactions when we have a rash or itchy skin. Symptoms can include:    Itching of the eyes, nose, and roof of the mouth    Runny or stuffy nose    Watery eyes     Sneezing or coughing     A blocked feeling in the ear    Red, itchy rash called hives    Red and purple spots    Rash, redness, welts, blisters    Itching, burning, stinging, pain    Dry, flaky, cracking, scaly skin  Severe symptoms include:    Swelling of the face, lips, or other parts of the body    Hoarse voice    Trouble swallowing, feeling like your throat is closing    Trouble breathing, wheezing    Nausea, vomiting, diarrhea, stomach cramps    Feeling faint or lightheaded, rapid heart rate  Sometimes the cause may be obvious. But there are so many things that can cause a reaction that you may not be able to figure out. The most important things to help find your allergen are:    Remembering when it started    What you were doing at the time or just before that    Any activities you were involved in    Any new products or contacts  Below are some common causes. But remember that almost anything can cause a reaction. You may not even be aware that you came into contact with one of these things:    Dust, mold, pollen    Plants (common ones are poison ivy and poison oak, but there are many others)     Animals    Foods such as shrimp, shellfish, peanuts, milk products, gluten, and eggs. Also food colorings, flavorings, and additives.    Insect bites or stings such as bees, mosquitos, fleas, ticks    Medicines such as penicillin, sulfa medicines, amoxicillin, aspirin, and ibuprofen. But any medicine can cause a reaction.    Jewelry such as nickel or gold. This can be new, or something you ve worn for a while, including zippers and buttons.    Latex such as in gloves, clothes, toys, balloons, or some tapes. Some people allergic to latex may also have problems with foods like bananas, avocados, kiwi, papaya, or  chestnuts.    Lotions, perfumes, cosmetics, soaps, shampoos, skincare products, nail products    Chemicals or dyes in clothing, linen, , hair dyes, soaps, iodine  Many viruses and common colds can cause a rash that is not an allergic reaction. Sometimes it is hard to tell the difference between allergies, sensitivity, or an intolerance to something. This is especially true with food. Many things can cause diarrhea, vomiting, stomach cramps, and skin irritation.  Home care    The goal of treatment is to help relieve the symptoms and get you feeling better. The rash will usually fade over several days. But it can sometimes last a couple of weeks. Over the next couple of days, there may be times when it is gets a little worse, and then better again. Here are some things to do:    If you know what you are allergic to, stay away from it. Future reactions could be worse than this one.    Avoid tight clothing and anything that heats up your skin (hot showers or baths, direct sunlight). Heat will make itching worse.    An ice pack will relieve local areas of intense itching and redness. To make an ice pack, put ice cubes in a plastic bag that seals at the top. Wrap it in a thin, clean towel. Don t put the ice directly on the skin because it can damage the skin.    Oral diphenhydramine is an over-the-counter antihistamine sold at pharmacy and grocery stores. Unless a prescription antihistamine was given, diphenhydramine may be used to reduce itching if large areas of the skin are involved. It may make you sleepy. So be careful using it in the daytime or when going to school, working, or driving. Note: Don t use diphenhydramine if you have glaucoma or if you are a man with trouble urinating due to an enlarged prostate. There are other antihistamines that won t make you so sleepy. These are good choices for daytime use. Ask your pharmacist for suggestions.    Don t use diphenhydramine cream on your skin. It can cause a  further reaction in some people.    To help prevent an infection, don't scratch the affected area. Scratching may worsen the reaction and damage your skin. It can also lead to an infection. Always check the affected for signs of an infection.    Call your healthcare provider and ask what you can use to help decrease the itching.    To decrease allergic reactions, try the following:      Use heat-steam to clean your home    Use high-efficiency particulate (HEPA) vacuums and filters    Stay away from food and pet triggers    Kill any cockroaches    Clean your house often  Follow-up care  Follow up with your healthcare provider, or as advised. If you had a severe reaction today, or if you have had several mild to medium allergic reactions in the past, ask your provider about allergy testing. This can help you find out what you are allergic to. If your reaction included dizziness, fainting, or trouble breathing or swallowing, ask your provider about carrying auto-injectable epinephrine.  Call 911  Call 911 if any of these occur:    Trouble breathing or swallowing, wheezing    Cool, moist, pale skin    Shortness of breath    Hoarse voice or trouble speaking    Confused     Very drowsy or trouble awakening    Fainting or loss of consciousness    Rapid heart rate    Feeling of dizziness or weakness or a sudden drop in blood pressure    Feeling of doom    Feeling lightheaded    Severe nausea or vomiting, or diarrhea    Seizure    Swelling in the face, eyelids, lips, mouth, throat or tongue    Drooling  When to seek medical advice  Call your healthcare provider right away if any of these occur:    Spreading areas of itching, redness or swelling    Nausea or stomach cramps or abdominal pain    Continuing or recurring symptoms    Spreading areas of redness, swelling, or itching    Signs of infection at the affected site:  ? Spreading redness  ? Increased pain or swelling  ? Fluid or colored drainage from the site  ? Fever of  100.4 F (38 C) or above lasting for 24 to 48 hours, or as directed by your provider  Date Last Reviewed: 3/1/2017    8735-5822 [employer]. [address] [place] [address], [place] [address]. All rights reserved. This information is not intended as a substitute for professional medical care. Always follow your healthcare professional's instructions.        Understanding Contact Dermatitis     A cool, moist compress can help reduce itching.     Contact dermatitis is a common type of skin rash. It s caused by something that touches the skin and makes it irritated and inflamed. It can occur on skin on any part of the body, such as the face, neck, hands, arms, and legs. Contact dermatitis is not spread from person to person.  Often, the reaction of contact dermatitis occurs 1 to 2 days after contact with the offending agent.  How to say it  CAYETANO-tact [id number]   What causes contact dermatitis?  It s caused by something that irritates the skin, or that creates an allergic reaction on the skin. People can get contact dermatitis from many kinds of things. These include:    Plant oils in poison ivy, oak, and sumac    Chemicals in household , solvents, and glue    Chemicals in makeup, soap, laundry detergent, perfume, acne cream, and hair products    Certain medicines, such as neomycin, bacitracin, benzocaine, and thimerosal    Metals such as nickel, found in some jewelry and watch bands     The sticky material on the back of bandages and tape (adhesive)    Things that can cause tiny breaks in the skin, such as wood, fiberglass, metal tools, and plant thorns    Rubber latex in surgical gloves and other medical supplies  Dermatitis can also be caused by the skin being damp for long periods of time. This can happen from washing your hands too often, or working with wet materials.  Symptoms of contact dermatitis  Symptoms can include skin that  is:    Blistered    Burning    Cracked    Dry    Itchy    Painful    Red    Rough, thickened, and leathery    Swollen    Warm  The blisters may ooze fluid and form crusts.  Treatment for contact dermatitis  Treatment is done to help relieve itching and reduce inflammation. The rash should go away in a few days to a few weeks. Treatments include:    Cool, moist compress. Use a clean damp cloth. Put it on the area for 20 to 30 minutes, 5 to 6 times a day for the first 3 days.    Steroid cream or ointment. You can apply this medicine several times a day on clean skin.    Oral corticosteroid. Your healthcare provider may prescribe this medicine if you have severe skin symptoms on a large part of your body.  Your healthcare provider may give you a steroid injection instead of pills.    Oral antihistamine. This medicine can help reduce itching.    Colloidal oatmeal bath. Soaking in water with colloidal oatmeal can help soothe skin.    Plain cream, lotion, or ointment. Cream, lotion, or ointment without medicine can help to soothe and protect your skin.  Living with contact dermatitis  Talk with your healthcare provider about what may have caused your contact dermatitis. Patch testing may help you figure out what caused the rash so you can avoid further contact with it. Once you learn what caused your rash, make sure to avoid that substance. If your skin comes into contact with it again, make sure to wash your skin right away. If you can t avoid the substance, wear gloves or other protective clothing before you touch it. Or use a cream, lotion, or ointment to protect your skin.  When to call your healthcare provider  Call your healthcare provider right away if you have any of these:    Fever of 100.4 F (38 C) or higher, or as directed    Symptoms that don t get better, or get worse    New symptoms   Date Last Reviewed: 5/1/2016 2000-2017 [employer]. [address] [place] [address], [place] [address]. All rights  reserved. This information is not intended as a substitute for professional medical care. Always follow your healthcare professional's instructions.         good balance

## 2021-10-06 NOTE — PHYSICAL THERAPY INITIAL EVALUATION ADULT - ORIENTATION, REHAB EVAL
Continued Stay Review    Date: 7/5/2019  Vital Signs: BP 98/60 (BP Location: Right arm)   Pulse 60   Temp 97 7 °F (36 5 °C) (Oral)   Resp 16   Ht 5' 1" (1 549 m)   Wt 69 5 kg (153 lb 3 5 oz)   SpO2 97%   BMI 28 95 kg/m²   Assessment/Plan:70 yr old female admitted on 6/18/2019 with acute on chronic chf -- her wt is down to 69 5 from 81 jkg  Cardiogenic shock resolved and cardiomyopathy likely tachycardia mediated, neg fluid balance -- lasix on hold due to eddie-- low na diet and fluid restrictions  afib with rvr corrected with cardioversion on 7/3, on amiodarone, coreg, and eliquis,  eddie improving an as is shock liver transaminitis and coags slowly improving  The patient will continue to require hospital stay due to chf      Medications:   Scheduled Meds:   Current Facility-Administered Medications:  acetaminophen 650 mg Oral Q4H PRN   albuterol 2 puff Inhalation Q4H PRN   amiodarone 200 mg Oral TID With Meals   apixaban 2 5 mg Oral BID   bisacodyl 10 mg Rectal Daily PRN   carvedilol 3 125 mg Oral BID With Meals   hydrALAZINE 10 mg Oral Q8H [address]   insulin lispro 1-5 Units Subcutaneous HS   insulin lispro 1-6 Units Subcutaneous TID AC   isosorbide dinitrate 10 mg Oral TID after meals   levothyroxine 137 mcg Oral Daily   loratadine 10 mg Oral Daily   melatonin 6 mg Oral HS   ondansetron 4 mg Intravenous Q6H PRN   phenol 2 spray Mouth/Throat Q2H PRN   polyethylene glycol 17 g Oral Daily   senna 1 tablet Oral HS   sodium chloride 2 spray Each Nare PRN   venlafaxine 50 mg Oral Q12H     Pertinent Labs/Diagnostic Results: bs 228  WBC 4 31 - 10 16 Thousand/uL 12  34High     RBC 3 81 - 5 12 Million/uL 4 63    Hemoglobin 11 5 - 15 4 g/dL 15 2    Hematocrit 34 8 - 46 1 % 44 7    MCV 82 - 98 fL 97    MCH 26 8 - 34 3 pg 32 8    MCHC 31 4 - 37 4 g/dL 34 0    RDW 11 6 - 15 1 % 14 0    Platelets 953 - 344 Thousands/uL 183    MPV 8 9 - 12 7 fL 10 6      Sodium 136 - 145 mmol/L 135Low     Potassium 3 5 - 5 3 mmol/L 3 8    Chloride 100 - 108 mmol/L 92Low     CO2 21 - 32 mmol/L 36High     ANION GAP 4 - 13 mmol/L 7    BUN 5 - 25 mg/dL 99High     Creatinine 0 60 - 1 30 mg/dL 3  33High     Comment: Standardized to IDMS reference method   Glucose 65 - 140 mg/dL 106       Calcium 8 3 - 10 1 mg/dL 9 2    eGFR ml/min/1 73sq m 13        Age/Sex: 68 y o  female   Discharge Plan: pt/ot  Are recommending str oriented to person, place, time and situation

## 2021-10-06 NOTE — PHYSICAL THERAPY INITIAL EVALUATION ADULT - GENERAL OBSERVATIONS, REHAB EVAL
Received sitting in chair denies pain +EKG, HFNC @ 50% Fi02, 4CT, nye, TLC, B SCD, NGT, juliana. left supine +lines intact, RN Vnas aware, call barrios

## 2021-10-06 NOTE — PROGRESS NOTE ADULT - SUBJECTIVE AND OBJECTIVE BOX
CTICU  CRITICAL  CARE  attending     Hand off received 					   Pertinent clinical, laboratory, radiographic, hemodynamic, echocardiographic, respiratory data, microbiologic data and chart were reviewed and analyzed frequently throughout the course of the day and night  Patient seen and examined with CTS/ SH attending at bedside  Pt is a 71y , Male, HEALTH ISSUES - PROBLEM Dx:      , FAMILY HISTORY:  Family history of stroke (Sibling)    Family history of cerebrovascular accident (CVA) (Sibling, Mother)    Family history of acute myocardial infarction (Sibling)    PAST MEDICAL & SURGICAL HISTORY:  Benign prostatic hypertrophy    HTN (hypertension)    HLD (hyperlipidemia)    DM (diabetes mellitus)    Atherosclerosis of coronary artery  CAD (coronary artery disease)    Type 2 diabetes mellitus  Diabetes    Hyperlipidemia  Hyperlipidemia    Essential hypertension  Hypertension    Pericardial effusion    H/O mitral valve repair    S/P pericardial window creation      Patient is a 71y old  Male who presents with a chief complaint of possible decortication for loculated pleural effusion (05 Oct 2021 22:52)      14 system review limited by mentation and multiorgan morbidity     Vital signs, hemodynamic and respiratory parameters were reviewed from the bedside nursing flowsheet.  ICU Vital Signs Last 24 Hrs  T(C): 36.1 (06 Oct 2021 17:31), Max: 36.7 (05 Oct 2021 21:21)  T(F): 97 (06 Oct 2021 17:31), Max: 98.1 (05 Oct 2021 21:21)  HR: 71 (06 Oct 2021 17:12) (67 - 144)  BP: --  BP(mean): --  ABP: 156/70 (06 Oct 2021 17:00) (120/72 - 156/70)  ABP(mean): 95 (06 Oct 2021 17:00) (76 - 99)  RR: 71 (06 Oct 2021 17:12) (16 - 71)  SpO2: 98% (06 Oct 2021 17:12) (94% - 100%)    Adult Advanced Hemodynamics Last 24 Hrs  CVP(mm Hg): 10 (06 Oct 2021 17:00) (5 - 244)  CVP(cm H2O): --  CO: --  CI: --  PA: --  PA(mean): --  PCWP: --  SVR: --  SVRI: --  PVR: --  PVRI: --, ABG - ( 06 Oct 2021 17:26 )  pH, Arterial: 7.48  pH, Blood: x     /  pCO2: 38    /  pO2: 92    / HCO3: 28    / Base Excess: 4.6   /  SaO2: 97.8              Mode: CPAP with PS  FiO2: 40  PEEP: 8  PS: 12  MAP: 12  PIP: 21    Intake and output was reviewed and the fluid balance was calculated  Daily     Daily   I&O's Summary    05 Oct 2021 07:01  -  06 Oct 2021 07:00  --------------------------------------------------------  IN: 1785.5 mL / OUT: 2703 mL / NET: -917.5 mL    06 Oct 2021 07:01  -  06 Oct 2021 19:44  --------------------------------------------------------  IN: 478 mL / OUT: 1780 mL / NET: -1302 mL        All lines and drain sites were assessed  [facility]      POCT Blood Glucose.: 131 mg/dL (06 Oct 2021 17:25)    No acute change in focality  Auscultation of the chest reveals equal bs  Abdomen is soft  Extremities are warm and well perfused  Wounds appear clean and unremarkable  Antibiotics are periop    labs  CBC Full  -  ( 06 Oct 2021 15:03 )  WBC Count : 10.68 K/uL  RBC Count : 3.42 M/uL  Hemoglobin : 9.7 g/dL  Hematocrit : 30.8 %  Platelet Count - Automated : 283 K/uL  Mean Cell Volume : 90.1 fl  Mean Cell Hemoglobin : 28.4 pg  Mean Cell Hemoglobin Concentration : 31.5 gm/dL  Auto Neutrophil # : x  Auto Lymphocyte # : x  Auto Monocyte # : x  Auto Eosinophil # : x  Auto Basophil # : x  Auto Neutrophil % : x  Auto Lymphocyte % : x  Auto Monocyte % : x  Auto Eosinophil % : x  Auto Basophil % : x    10-06    142  |  105  |  18  ----------------------------<  213<H>  3.7   |  28  |  0.96    Ca    8.8      06 Oct 2021 15:03  Phos  2.7     10-06  Mg     1.9     10-06    TPro  6.0  /  Alb  3.4  /  TBili  1.3<H>  /  DBili  x   /  AST  54<H>  /  ALT  60<H>  /  AlkPhos  133<H>  10-06    PT/INR - ( 06 Oct 2021 15:03 )   PT: 14.5 sec;   INR: 1.22          PTT - ( 06 Oct 2021 15:03 )  PTT:29.1 sec  The current medications were reviewed   MEDICATIONS  (STANDING):  aspirin enteric coated 81 milliGRAM(s) Oral daily  chlorhexidine 2% Cloths 1 Application(s) Topical <User Schedule>  dexMEDEtomidine Infusion 0.2 MICROgram(s)/kG/Hr (4.48 mL/Hr) IV Continuous <Continuous>  dextrose 40% Gel 15 Gram(s) Oral once  dextrose 5%. 1000 milliLiter(s) (50 mL/Hr) IV Continuous <Continuous>  dextrose 5%. 1000 milliLiter(s) (100 mL/Hr) IV Continuous <Continuous>  dextrose 50% Injectable 25 Gram(s) IV Push once  dextrose 50% Injectable 12.5 Gram(s) IV Push once  dextrose 50% Injectable 25 Gram(s) IV Push once  glucagon  Injectable 1 milliGRAM(s) IntraMuscular once  heparin   Injectable 5000 Unit(s) SubCutaneous every 8 hours  insulin lispro (ADMELOG) corrective regimen sliding scale   SubCutaneous every 6 hours  levalbuterol Inhalation 0.63 milliGRAM(s) Inhalation every 6 hours  metoprolol tartrate Injectable 5 milliGRAM(s) IV Push every 6 hours  pantoprazole  Injectable 40 milliGRAM(s) IV Push daily  piperacillin/tazobactam IVPB.. 3.375 Gram(s) IV Intermittent every 6 hours  sodium chloride 0.9%. 1000 milliLiter(s) (10 mL/Hr) IV Continuous <Continuous>  vancomycin  IVPB        MEDICATIONS  (PRN):       PROBLEM LIST/ ASSESSMENT:  HEALTH ISSUES - PROBLEM Dx:      ,   Patient is a 71y old  Male who presents with a chief complaint of possible decortication for loculated pleural effusion (05 Oct 2021 22:52)     s/p cardiac surgery                My plan includes :  close hemodynamic, ventilatory and drain monitoring and management per post op routine    Monitor for arrhythmias and monitor parameters for organ perfusion  beta blockade not administered due to hemodynamic instability and bradycardia  monitor neurologic status  Head of the bed should remain elevated to 45 deg .   chest PT and IS will be encouraged  monitor adequacy of oxygenation and ventilation and attempt to wean oxygen  antibiotic regimen will be tailored to the clinical, laboratory and microbiologic data  Nutritional goals will be met using po eventually , ensure adequate caloric intake and montior the same  Stress ulcer and VTE prophylaxis will be achieved    Glycemic control is satisfactory  Electrolytes have been repleted as necessary and wound care has been carried out. Pain control has been achieved.   agressive physical therapy and early mobility and ambulation goals will be met   The family was updated about the course and plan  CRITICAL CARE TIME personally provided by me  in evaluation and management, reassessments, review and interpretation of labs and x-rays, ventilator and hemodynamic management, formulating a plan and coordinating care: ___90____ MIN.  Time does not include procedural time. Time spent was non routine post-operarive caRE and included multiple and repeated evaluations at the bedside  CTICU ATTENDING     					    Lex Sierra MD

## 2021-10-06 NOTE — PHYSICAL THERAPY INITIAL EVALUATION ADULT - PERTINENT HX OF CURRENT PROBLEM, REHAB EVAL
71M who presented to [facility] with acute SOB.  At the time, he denied chest pain, palpitations, diaphoresis. No F/C/NS/N/V/D. No cough, calf tenderness or increased LE edema.

## 2021-10-06 NOTE — PROGRESS NOTE ADULT - SUBJECTIVE AND OBJECTIVE BOX
CTICU  CRITICAL  CARE  PROCEDURE  NOTE      				     Name of procedure – Flexible fiberoptic bronchoscopy      Flexible fiberoptic bronchoscopy was performed under propofol and fentanyl sedation while the patient was intubated for controlled mechanical ventilation  Pre-procedural assessment reveals no risk of Tb  Ventilator settings were adjusted to reduce airway pressures to reduce the risk of ptx  The scope was advanced past the ett which was noted to be 2 cm the fuad   The fuad was sharp  Right LL and RML air way were patent , mucopur thick secretions  Lavaged and sent for culture  Left LL air way  with copious purulent secretions, lavaged, and sent for culture  CXR confirmed no pneumothorax post bronch  There were no complications  The patient tolerated the procedure well

## 2021-10-07 PROBLEM — I31.3 PERICARDIAL EFFUSION (NONINFLAMMATORY): Chronic | Status: ACTIVE | Noted: 2021-10-03

## 2021-10-07 LAB
ALBUMIN SERPL ELPH-MCNC: 3.2 G/DL — LOW (ref 3.3–5)
ALBUMIN SERPL ELPH-MCNC: 3.4 G/DL — SIGNIFICANT CHANGE UP (ref 3.3–5)
ALBUMIN SERPL ELPH-MCNC: 3.6 G/DL — SIGNIFICANT CHANGE UP (ref 3.3–5)
ALP SERPL-CCNC: 132 U/L — HIGH (ref 40–120)
ALP SERPL-CCNC: 143 U/L — HIGH (ref 40–120)
ALP SERPL-CCNC: 144 U/L — HIGH (ref 40–120)
ALT FLD-CCNC: 65 U/L — HIGH (ref 10–45)
ALT FLD-CCNC: 66 U/L — HIGH (ref 10–45)
ALT FLD-CCNC: 70 U/L — HIGH (ref 10–45)
ANION GAP SERPL CALC-SCNC: 8 MMOL/L — SIGNIFICANT CHANGE UP (ref 5–17)
ANION GAP SERPL CALC-SCNC: 9 MMOL/L — SIGNIFICANT CHANGE UP (ref 5–17)
APTT BLD: 28.6 SEC — SIGNIFICANT CHANGE UP (ref 27.5–35.5)
APTT BLD: 29.5 SEC — SIGNIFICANT CHANGE UP (ref 27.5–35.5)
APTT BLD: 36.7 SEC — HIGH (ref 27.5–35.5)
AST SERPL-CCNC: 45 U/L — HIGH (ref 10–40)
AST SERPL-CCNC: 55 U/L — HIGH (ref 10–40)
AST SERPL-CCNC: 58 U/L — HIGH (ref 10–40)
BILIRUB SERPL-MCNC: 0.8 MG/DL — SIGNIFICANT CHANGE UP (ref 0.2–1.2)
BILIRUB SERPL-MCNC: 1.1 MG/DL — SIGNIFICANT CHANGE UP (ref 0.2–1.2)
BILIRUB SERPL-MCNC: 1.5 MG/DL — HIGH (ref 0.2–1.2)
BUN SERPL-MCNC: 13 MG/DL — SIGNIFICANT CHANGE UP (ref 7–23)
BUN SERPL-MCNC: 14 MG/DL — SIGNIFICANT CHANGE UP (ref 7–23)
BUN SERPL-MCNC: 14 MG/DL — SIGNIFICANT CHANGE UP (ref 7–23)
BUN SERPL-MCNC: 16 MG/DL — SIGNIFICANT CHANGE UP (ref 7–23)
CALCIUM SERPL-MCNC: 8.3 MG/DL — LOW (ref 8.4–10.5)
CALCIUM SERPL-MCNC: 8.4 MG/DL — SIGNIFICANT CHANGE UP (ref 8.4–10.5)
CALCIUM SERPL-MCNC: 8.5 MG/DL — SIGNIFICANT CHANGE UP (ref 8.4–10.5)
CALCIUM SERPL-MCNC: 9 MG/DL — SIGNIFICANT CHANGE UP (ref 8.4–10.5)
CHLORIDE SERPL-SCNC: 104 MMOL/L — SIGNIFICANT CHANGE UP (ref 96–108)
CHLORIDE SERPL-SCNC: 104 MMOL/L — SIGNIFICANT CHANGE UP (ref 96–108)
CHLORIDE SERPL-SCNC: 105 MMOL/L — SIGNIFICANT CHANGE UP (ref 96–108)
CHLORIDE SERPL-SCNC: 107 MMOL/L — SIGNIFICANT CHANGE UP (ref 96–108)
CO2 SERPL-SCNC: 25 MMOL/L — SIGNIFICANT CHANGE UP (ref 22–31)
CO2 SERPL-SCNC: 26 MMOL/L — SIGNIFICANT CHANGE UP (ref 22–31)
CO2 SERPL-SCNC: 26 MMOL/L — SIGNIFICANT CHANGE UP (ref 22–31)
CO2 SERPL-SCNC: 27 MMOL/L — SIGNIFICANT CHANGE UP (ref 22–31)
CREAT SERPL-MCNC: 0.73 MG/DL — SIGNIFICANT CHANGE UP (ref 0.5–1.3)
CREAT SERPL-MCNC: 0.73 MG/DL — SIGNIFICANT CHANGE UP (ref 0.5–1.3)
CREAT SERPL-MCNC: 0.81 MG/DL — SIGNIFICANT CHANGE UP (ref 0.5–1.3)
CREAT SERPL-MCNC: 0.91 MG/DL — SIGNIFICANT CHANGE UP (ref 0.5–1.3)
CULTURE RESULTS: SIGNIFICANT CHANGE UP
GAS PNL BLDA: SIGNIFICANT CHANGE UP
GLUCOSE BLDC GLUCOMTR-MCNC: 140 MG/DL — HIGH (ref 70–99)
GLUCOSE BLDC GLUCOMTR-MCNC: 147 MG/DL — HIGH (ref 70–99)
GLUCOSE BLDC GLUCOMTR-MCNC: 152 MG/DL — HIGH (ref 70–99)
GLUCOSE BLDC GLUCOMTR-MCNC: 167 MG/DL — HIGH (ref 70–99)
GLUCOSE SERPL-MCNC: 124 MG/DL — HIGH (ref 70–99)
GLUCOSE SERPL-MCNC: 140 MG/DL — HIGH (ref 70–99)
GLUCOSE SERPL-MCNC: 142 MG/DL — HIGH (ref 70–99)
GLUCOSE SERPL-MCNC: 170 MG/DL — HIGH (ref 70–99)
HCT VFR BLD CALC: 29.9 % — LOW (ref 39–50)
HCT VFR BLD CALC: 29.9 % — LOW (ref 39–50)
HCT VFR BLD CALC: 30.6 % — LOW (ref 39–50)
HGB BLD-MCNC: 9.2 G/DL — LOW (ref 13–17)
HGB BLD-MCNC: 9.3 G/DL — LOW (ref 13–17)
HGB BLD-MCNC: 9.9 G/DL — LOW (ref 13–17)
INR BLD: 1.15 — SIGNIFICANT CHANGE UP (ref 0.88–1.16)
INR BLD: 1.25 — HIGH (ref 0.88–1.16)
INR BLD: 1.25 — HIGH (ref 0.88–1.16)
MAGNESIUM SERPL-MCNC: 2 MG/DL — SIGNIFICANT CHANGE UP (ref 1.6–2.6)
MAGNESIUM SERPL-MCNC: 2.1 MG/DL — SIGNIFICANT CHANGE UP (ref 1.6–2.6)
MAGNESIUM SERPL-MCNC: 2.1 MG/DL — SIGNIFICANT CHANGE UP (ref 1.6–2.6)
MAGNESIUM SERPL-MCNC: 2.3 MG/DL — SIGNIFICANT CHANGE UP (ref 1.6–2.6)
MCHC RBC-ENTMCNC: 27.5 PG — SIGNIFICANT CHANGE UP (ref 27–34)
MCHC RBC-ENTMCNC: 28.1 PG — SIGNIFICANT CHANGE UP (ref 27–34)
MCHC RBC-ENTMCNC: 29 PG — SIGNIFICANT CHANGE UP (ref 27–34)
MCHC RBC-ENTMCNC: 30.8 GM/DL — LOW (ref 32–36)
MCHC RBC-ENTMCNC: 31.1 GM/DL — LOW (ref 32–36)
MCHC RBC-ENTMCNC: 32.4 GM/DL — SIGNIFICANT CHANGE UP (ref 32–36)
MCV RBC AUTO: 89.5 FL — SIGNIFICANT CHANGE UP (ref 80–100)
MCV RBC AUTO: 89.7 FL — SIGNIFICANT CHANGE UP (ref 80–100)
MCV RBC AUTO: 90.3 FL — SIGNIFICANT CHANGE UP (ref 80–100)
NRBC # BLD: 0 /100 WBCS — SIGNIFICANT CHANGE UP (ref 0–0)
PHOSPHATE SERPL-MCNC: 2.1 MG/DL — LOW (ref 2.5–4.5)
PHOSPHATE SERPL-MCNC: 2.3 MG/DL — LOW (ref 2.5–4.5)
PHOSPHATE SERPL-MCNC: 2.3 MG/DL — LOW (ref 2.5–4.5)
PLATELET # BLD AUTO: 271 K/UL — SIGNIFICANT CHANGE UP (ref 150–400)
PLATELET # BLD AUTO: 290 K/UL — SIGNIFICANT CHANGE UP (ref 150–400)
PLATELET # BLD AUTO: 304 K/UL — SIGNIFICANT CHANGE UP (ref 150–400)
POTASSIUM SERPL-MCNC: 3.7 MMOL/L — SIGNIFICANT CHANGE UP (ref 3.5–5.3)
POTASSIUM SERPL-MCNC: 3.9 MMOL/L — SIGNIFICANT CHANGE UP (ref 3.5–5.3)
POTASSIUM SERPL-MCNC: 4.1 MMOL/L — SIGNIFICANT CHANGE UP (ref 3.5–5.3)
POTASSIUM SERPL-MCNC: 4.4 MMOL/L — SIGNIFICANT CHANGE UP (ref 3.5–5.3)
POTASSIUM SERPL-SCNC: 3.7 MMOL/L — SIGNIFICANT CHANGE UP (ref 3.5–5.3)
POTASSIUM SERPL-SCNC: 3.9 MMOL/L — SIGNIFICANT CHANGE UP (ref 3.5–5.3)
POTASSIUM SERPL-SCNC: 4.1 MMOL/L — SIGNIFICANT CHANGE UP (ref 3.5–5.3)
POTASSIUM SERPL-SCNC: 4.4 MMOL/L — SIGNIFICANT CHANGE UP (ref 3.5–5.3)
PROT SERPL-MCNC: 5.7 G/DL — LOW (ref 6–8.3)
PROT SERPL-MCNC: 5.9 G/DL — LOW (ref 6–8.3)
PROT SERPL-MCNC: 6.1 G/DL — SIGNIFICANT CHANGE UP (ref 6–8.3)
PROTHROM AB SERPL-ACNC: 13.7 SEC — HIGH (ref 10.6–13.6)
PROTHROM AB SERPL-ACNC: 14.8 SEC — HIGH (ref 10.6–13.6)
PROTHROM AB SERPL-ACNC: 14.8 SEC — HIGH (ref 10.6–13.6)
RBC # BLD: 3.31 M/UL — LOW (ref 4.2–5.8)
RBC # BLD: 3.34 M/UL — LOW (ref 4.2–5.8)
RBC # BLD: 3.41 M/UL — LOW (ref 4.2–5.8)
RBC # FLD: 14 % — SIGNIFICANT CHANGE UP (ref 10.3–14.5)
RBC # FLD: 14 % — SIGNIFICANT CHANGE UP (ref 10.3–14.5)
RBC # FLD: 14.1 % — SIGNIFICANT CHANGE UP (ref 10.3–14.5)
SODIUM SERPL-SCNC: 138 MMOL/L — SIGNIFICANT CHANGE UP (ref 135–145)
SODIUM SERPL-SCNC: 139 MMOL/L — SIGNIFICANT CHANGE UP (ref 135–145)
SODIUM SERPL-SCNC: 140 MMOL/L — SIGNIFICANT CHANGE UP (ref 135–145)
SODIUM SERPL-SCNC: 142 MMOL/L — SIGNIFICANT CHANGE UP (ref 135–145)
SPECIMEN SOURCE: SIGNIFICANT CHANGE UP
WBC # BLD: 7.38 K/UL — SIGNIFICANT CHANGE UP (ref 3.8–10.5)
WBC # BLD: 8.78 K/UL — SIGNIFICANT CHANGE UP (ref 3.8–10.5)
WBC # BLD: 8.91 K/UL — SIGNIFICANT CHANGE UP (ref 3.8–10.5)
WBC # FLD AUTO: 7.38 K/UL — SIGNIFICANT CHANGE UP (ref 3.8–10.5)
WBC # FLD AUTO: 8.78 K/UL — SIGNIFICANT CHANGE UP (ref 3.8–10.5)
WBC # FLD AUTO: 8.91 K/UL — SIGNIFICANT CHANGE UP (ref 3.8–10.5)

## 2021-10-07 PROCEDURE — 84132 ASSAY OF SERUM POTASSIUM: CPT

## 2021-10-07 PROCEDURE — 86850 RBC ANTIBODY SCREEN: CPT

## 2021-10-07 PROCEDURE — 71045 X-RAY EXAM CHEST 1 VIEW: CPT

## 2021-10-07 PROCEDURE — P9045: CPT

## 2021-10-07 PROCEDURE — 86900 BLOOD TYPING SEROLOGIC ABO: CPT

## 2021-10-07 PROCEDURE — 86769 SARS-COV-2 COVID-19 ANTIBODY: CPT

## 2021-10-07 PROCEDURE — 86901 BLOOD TYPING SEROLOGIC RH(D): CPT

## 2021-10-07 PROCEDURE — 36415 COLL VENOUS BLD VENIPUNCTURE: CPT

## 2021-10-07 PROCEDURE — 83735 ASSAY OF MAGNESIUM: CPT

## 2021-10-07 PROCEDURE — 85730 THROMBOPLASTIN TIME PARTIAL: CPT

## 2021-10-07 PROCEDURE — 99291 CRITICAL CARE FIRST HOUR: CPT

## 2021-10-07 PROCEDURE — 36430 TRANSFUSION BLD/BLD COMPNT: CPT

## 2021-10-07 PROCEDURE — 97161 PT EVAL LOW COMPLEX 20 MIN: CPT

## 2021-10-07 PROCEDURE — 85025 COMPLETE CBC W/AUTO DIFF WBC: CPT

## 2021-10-07 PROCEDURE — 36573 INSJ PICC RS&I 5 YR+: CPT

## 2021-10-07 PROCEDURE — C8929: CPT

## 2021-10-07 PROCEDURE — 82330 ASSAY OF CALCIUM: CPT

## 2021-10-07 PROCEDURE — 85027 COMPLETE CBC AUTOMATED: CPT

## 2021-10-07 PROCEDURE — P9037: CPT

## 2021-10-07 PROCEDURE — 84484 ASSAY OF TROPONIN QUANT: CPT

## 2021-10-07 PROCEDURE — 93306 TTE W/DOPPLER COMPLETE: CPT

## 2021-10-07 PROCEDURE — 93308 TTE F-UP OR LMTD: CPT | Mod: 26

## 2021-10-07 PROCEDURE — 82803 BLOOD GASES ANY COMBINATION: CPT

## 2021-10-07 PROCEDURE — P9016: CPT

## 2021-10-07 PROCEDURE — 80053 COMPREHEN METABOLIC PANEL: CPT

## 2021-10-07 PROCEDURE — 85610 PROTHROMBIN TIME: CPT

## 2021-10-07 PROCEDURE — 85384 FIBRINOGEN ACTIVITY: CPT

## 2021-10-07 PROCEDURE — 83605 ASSAY OF LACTIC ACID: CPT

## 2021-10-07 PROCEDURE — 86923 COMPATIBILITY TEST ELECTRIC: CPT

## 2021-10-07 PROCEDURE — 71045 X-RAY EXAM CHEST 1 VIEW: CPT | Mod: 26,76

## 2021-10-07 PROCEDURE — 82962 GLUCOSE BLOOD TEST: CPT

## 2021-10-07 PROCEDURE — 84100 ASSAY OF PHOSPHORUS: CPT

## 2021-10-07 PROCEDURE — 86803 HEPATITIS C AB TEST: CPT

## 2021-10-07 PROCEDURE — 88305 TISSUE EXAM BY PATHOLOGIST: CPT

## 2021-10-07 PROCEDURE — P9011: CPT

## 2021-10-07 PROCEDURE — P9047: CPT

## 2021-10-07 PROCEDURE — 80048 BASIC METABOLIC PNL TOTAL CA: CPT

## 2021-10-07 PROCEDURE — 71250 CT THORAX DX C-: CPT

## 2021-10-07 PROCEDURE — 97530 THERAPEUTIC ACTIVITIES: CPT

## 2021-10-07 PROCEDURE — 84295 ASSAY OF SERUM SODIUM: CPT

## 2021-10-07 PROCEDURE — 93321 DOPPLER ECHO F-UP/LMTD STD: CPT | Mod: 26

## 2021-10-07 PROCEDURE — 86891 AUTOLOGOUS BLOOD OP SALVAGE: CPT

## 2021-10-07 PROCEDURE — 93005 ELECTROCARDIOGRAM TRACING: CPT

## 2021-10-07 PROCEDURE — C1889: CPT

## 2021-10-07 RX ORDER — SENNA PLUS 8.6 MG/1
2 TABLET ORAL AT BEDTIME
Refills: 0 | Status: DISCONTINUED | OUTPATIENT
Start: 2021-10-07 | End: 2021-10-08

## 2021-10-07 RX ORDER — POTASSIUM CHLORIDE 20 MEQ
20 PACKET (EA) ORAL ONCE
Refills: 0 | Status: COMPLETED | OUTPATIENT
Start: 2021-10-07 | End: 2021-10-07

## 2021-10-07 RX ORDER — FUROSEMIDE 40 MG
40 TABLET ORAL DAILY
Refills: 0 | Status: DISCONTINUED | OUTPATIENT
Start: 2021-10-07 | End: 2021-10-11

## 2021-10-07 RX ORDER — HEPARIN SODIUM 5000 [USP'U]/ML
700 INJECTION INTRAVENOUS; SUBCUTANEOUS
Qty: 25000 | Refills: 0 | Status: DISCONTINUED | OUTPATIENT
Start: 2021-10-07 | End: 2021-10-10

## 2021-10-07 RX ORDER — POTASSIUM CHLORIDE 20 MEQ
40 PACKET (EA) ORAL ONCE
Refills: 0 | Status: COMPLETED | OUTPATIENT
Start: 2021-10-07 | End: 2021-10-07

## 2021-10-07 RX ORDER — CALCIUM GLUCONATE 100 MG/ML
2 VIAL (ML) INTRAVENOUS ONCE
Refills: 0 | Status: COMPLETED | OUTPATIENT
Start: 2021-10-07 | End: 2021-10-07

## 2021-10-07 RX ORDER — AMIODARONE HYDROCHLORIDE 400 MG/1
150 TABLET ORAL ONCE
Refills: 0 | Status: COMPLETED | OUTPATIENT
Start: 2021-10-07 | End: 2021-10-07

## 2021-10-07 RX ORDER — DILTIAZEM HCL 120 MG
60 CAPSULE, EXT RELEASE 24 HR ORAL EVERY 6 HOURS
Refills: 0 | Status: DISCONTINUED | OUTPATIENT
Start: 2021-10-07 | End: 2021-10-08

## 2021-10-07 RX ORDER — POTASSIUM CHLORIDE 20 MEQ
20 PACKET (EA) ORAL
Refills: 0 | Status: COMPLETED | OUTPATIENT
Start: 2021-10-07 | End: 2021-10-07

## 2021-10-07 RX ORDER — DILTIAZEM HCL 120 MG
30 CAPSULE, EXT RELEASE 24 HR ORAL EVERY 6 HOURS
Refills: 0 | Status: DISCONTINUED | OUTPATIENT
Start: 2021-10-07 | End: 2021-10-07

## 2021-10-07 RX ORDER — MAGNESIUM SULFATE 500 MG/ML
2 VIAL (ML) INJECTION ONCE
Refills: 0 | Status: COMPLETED | OUTPATIENT
Start: 2021-10-07 | End: 2021-10-07

## 2021-10-07 RX ORDER — POTASSIUM CHLORIDE 20 MEQ
20 PACKET (EA) ORAL DAILY
Refills: 0 | Status: DISCONTINUED | OUTPATIENT
Start: 2021-10-07 | End: 2021-10-11

## 2021-10-07 RX ORDER — DILTIAZEM HCL 120 MG
10 CAPSULE, EXT RELEASE 24 HR ORAL ONCE
Refills: 0 | Status: COMPLETED | OUTPATIENT
Start: 2021-10-07 | End: 2021-10-07

## 2021-10-07 RX ORDER — METOPROLOL TARTRATE 50 MG
5 TABLET ORAL ONCE
Refills: 0 | Status: COMPLETED | OUTPATIENT
Start: 2021-10-07 | End: 2021-10-07

## 2021-10-07 RX ADMIN — Medication 50 MILLIEQUIVALENT(S): at 00:16

## 2021-10-07 RX ADMIN — Medication 40 MILLIEQUIVALENT(S): at 19:04

## 2021-10-07 RX ADMIN — PANTOPRAZOLE SODIUM 40 MILLIGRAM(S): 20 TABLET, DELAYED RELEASE ORAL at 12:18

## 2021-10-07 RX ADMIN — Medication 250 MILLIGRAM(S): at 05:06

## 2021-10-07 RX ADMIN — HEPARIN SODIUM 5000 UNIT(S): 5000 INJECTION INTRAVENOUS; SUBCUTANEOUS at 05:06

## 2021-10-07 RX ADMIN — Medication 81 MILLIGRAM(S): at 12:19

## 2021-10-07 RX ADMIN — LEVALBUTEROL 0.63 MILLIGRAM(S): 1.25 SOLUTION, CONCENTRATE RESPIRATORY (INHALATION) at 06:13

## 2021-10-07 RX ADMIN — Medication 50 MILLIEQUIVALENT(S): at 01:29

## 2021-10-07 RX ADMIN — Medication 5 MILLIGRAM(S): at 06:15

## 2021-10-07 RX ADMIN — Medication 30 MILLIGRAM(S): at 06:28

## 2021-10-07 RX ADMIN — Medication 60 MILLIGRAM(S): at 17:42

## 2021-10-07 RX ADMIN — Medication 5 MILLIGRAM(S): at 12:18

## 2021-10-07 RX ADMIN — Medication 5 MILLIGRAM(S): at 23:14

## 2021-10-07 RX ADMIN — Medication 2: at 12:17

## 2021-10-07 RX ADMIN — LEVALBUTEROL 0.63 MILLIGRAM(S): 1.25 SOLUTION, CONCENTRATE RESPIRATORY (INHALATION) at 18:56

## 2021-10-07 RX ADMIN — CHLORHEXIDINE GLUCONATE 1 APPLICATION(S): 213 SOLUTION TOPICAL at 05:06

## 2021-10-07 RX ADMIN — PIPERACILLIN AND TAZOBACTAM 200 GRAM(S): 4; .5 INJECTION, POWDER, LYOPHILIZED, FOR SOLUTION INTRAVENOUS at 23:15

## 2021-10-07 RX ADMIN — Medication 50 GRAM(S): at 00:16

## 2021-10-07 RX ADMIN — PIPERACILLIN AND TAZOBACTAM 200 GRAM(S): 4; .5 INJECTION, POWDER, LYOPHILIZED, FOR SOLUTION INTRAVENOUS at 17:41

## 2021-10-07 RX ADMIN — Medication 2: at 18:14

## 2021-10-07 RX ADMIN — HEPARIN SODIUM 7 UNIT(S)/HR: 5000 INJECTION INTRAVENOUS; SUBCUTANEOUS at 17:13

## 2021-10-07 RX ADMIN — Medication 5 MILLIGRAM(S): at 16:24

## 2021-10-07 RX ADMIN — AMIODARONE HYDROCHLORIDE 133.33 MILLIGRAM(S): 400 TABLET ORAL at 17:54

## 2021-10-07 RX ADMIN — Medication 5 MILLIGRAM(S): at 19:10

## 2021-10-07 RX ADMIN — Medication 5 MG/HR: at 00:30

## 2021-10-07 RX ADMIN — Medication 200 GRAM(S): at 04:26

## 2021-10-07 RX ADMIN — LEVALBUTEROL 0.63 MILLIGRAM(S): 1.25 SOLUTION, CONCENTRATE RESPIRATORY (INHALATION) at 23:18

## 2021-10-07 RX ADMIN — PIPERACILLIN AND TAZOBACTAM 200 GRAM(S): 4; .5 INJECTION, POWDER, LYOPHILIZED, FOR SOLUTION INTRAVENOUS at 05:07

## 2021-10-07 RX ADMIN — Medication 10 MILLIGRAM(S): at 01:07

## 2021-10-07 RX ADMIN — Medication 60 MILLIGRAM(S): at 23:15

## 2021-10-07 RX ADMIN — PIPERACILLIN AND TAZOBACTAM 200 GRAM(S): 4; .5 INJECTION, POWDER, LYOPHILIZED, FOR SOLUTION INTRAVENOUS at 12:19

## 2021-10-07 RX ADMIN — Medication 50 MILLIEQUIVALENT(S): at 02:34

## 2021-10-07 RX ADMIN — Medication 10 MILLIGRAM(S): at 04:50

## 2021-10-07 RX ADMIN — SENNA PLUS 2 TABLET(S): 8.6 TABLET ORAL at 23:15

## 2021-10-07 RX ADMIN — Medication 30 MILLIGRAM(S): at 12:18

## 2021-10-07 NOTE — DIETITIAN INITIAL EVALUATION ADULT. - PERTINENT LABORATORY DATA
Sodium, Serum: 140 mmol/L  Potassium, Serum: 4.1 mmol/L  Chloride, Serum: 105 mmol/L  BUN, Serum:14 mg/dL  Creatinine, Serum: 0.81 mg/dL  Glucose, Serum: 142 mg/dL (Elevated)  Calcium, Serum: 9.0 mg/dL  Alkaline Phosphatase, Serum: 144 U/L (Elevated)  ALT/SGPT: 70 U/L (Elevated)  Magnesium, Serum: 2.1 mg/dL  Phosphorus, Serum: 2.3 mg/dL (Low)    Last HbA1c 5.9% (10/3); noted w h/o diabetes    Fingersticks 10/6-10/7  10/6: 181 mg/dL  10/6: 205 mg/dL  10/6: 146 mg/dL  10/6: 127 mg/dL  10/6: 131 mg/dL  10/6: 173 mg/dL  10/7: 147 mg/dL

## 2021-10-07 NOTE — SWALLOW BEDSIDE ASSESSMENT ADULT - PHARYNGEAL PHASE
Hyolaryngeal excursion palpated during swallow trigger; appears brisk & timely. No throat clear, cough or change in voice following PO trials. Pt denies globus or other swallowing impairment.

## 2021-10-07 NOTE — DIETITIAN INITIAL EVALUATION ADULT. - ADD RECOMMEND
1. As medically feasible, advance diet to DASH/TLC CC diet (monitor need for ONS) 2. Encourage pt to meet nutritional needs as able 3. RD to obtain additional subjective information, provide diet ed and obtain preferences as able 4. Pain and bowel regimen per team

## 2021-10-07 NOTE — PROGRESS NOTE ADULT - SUBJECTIVE AND OBJECTIVE BOX
CTICU  CRITICAL  CARE  attending     Hand off received 					   Pertinent clinical, laboratory, radiographic, hemodynamic, echocardiographic, respiratory data, microbiologic data and chart were reviewed and analyzed frequently throughout the course of the day and night  Patient seen and examined with CTS/ SH attending at bedside  Pt is a 71y , Male, HEALTH ISSUES - PROBLEM Dx:      , FAMILY HISTORY:  Family history of stroke (Sibling)    Family history of cerebrovascular accident (CVA) (Sibling, Mother)    Family history of acute myocardial infarction (Sibling)    PAST MEDICAL & SURGICAL HISTORY:  Benign prostatic hypertrophy    HTN (hypertension)    HLD (hyperlipidemia)    DM (diabetes mellitus)    Atherosclerosis of coronary artery  CAD (coronary artery disease)    Type 2 diabetes mellitus  Diabetes    Hyperlipidemia  Hyperlipidemia    Essential hypertension  Hypertension    Pericardial effusion    H/O mitral valve repair    S/P pericardial window creation      Patient is a 71y old  Male who presents with a chief complaint of possible decortication for loculated pleural effusion (07 Oct 2021 10:51)      14 system review was unremarkable    Vital signs, hemodynamic and respiratory parameters were reviewed from the bedside nursing flowsheet.  ICU Vital Signs Last 24 Hrs  T(C): 36.4 (07 Oct 2021 05:01), Max: 36.4 (07 Oct 2021 05:01)  T(F): 97.5 (07 Oct 2021 05:01), Max: 97.5 (07 Oct 2021 05:01)  HR: 71 (07 Oct 2021 13:00) (70 - 144)  BP: 136/69 (06 Oct 2021 18:00) (136/69 - 136/69)  BP(mean): 97 (06 Oct 2021 18:00) (97 - 97)  ABP: 146/68 (07 Oct 2021 13:00) (93/73 - 168/56)  ABP(mean): 93 (07 Oct 2021 13:00) (71 - 95)  RR: 16 (07 Oct 2021 13:00) (16 - 24)  SpO2: 100% (07 Oct 2021 13:00) (92% - 100%)    Adult Advanced Hemodynamics Last 24 Hrs  CVP(mm Hg): 4 (07 Oct 2021 13:00) (3 - 12)  CVP(cm H2O): --  CO: --  CI: --  PA: --  PA(mean): --  PCWP: --  SVR: --  SVRI: --  PVR: --  PVRI: --, ABG - ( 07 Oct 2021 10:06 )  pH, Arterial: 7.47  pH, Blood: x     /  pCO2: 36    /  pO2: 108   / HCO3: 26    / Base Excess: 2.7   /  SaO2: 98.3                Intake and output was reviewed and the fluid balance was calculated  Daily     Daily   I&O's Summary    06 Oct 2021 07:01  -  07 Oct 2021 07:00  --------------------------------------------------------  IN: 1918 mL / OUT: 3040 mL / NET: -1122 mL    07 Oct 2021 07:01  -  07 Oct 2021 13:59  --------------------------------------------------------  IN: 290 mL / OUT: 650 mL / NET: -360 mL        All lines and drain sites were assessed  Glycemic trend was reviewedCAPILLARY BLOOD GLUCOSE      POCT Blood Glucose.: 152 mg/dL (07 Oct 2021 12:06)    No acute change in mental status  Auscultation of the chest reveals equal bs  Abdomen is soft  Extremities are warm and well perfused  Wounds appear clean and unremarkable  Antibiotics are periop    labs  CBC Full  -  ( 07 Oct 2021 10:07 )  WBC Count : 8.91 K/uL  RBC Count : 3.41 M/uL  Hemoglobin : 9.9 g/dL  Hematocrit : 30.6 %  Platelet Count - Automated : 304 K/uL  Mean Cell Volume : 89.7 fl  Mean Cell Hemoglobin : 29.0 pg  Mean Cell Hemoglobin Concentration : 32.4 gm/dL  Auto Neutrophil # : x  Auto Lymphocyte # : x  Auto Monocyte # : x  Auto Eosinophil # : x  Auto Basophil # : x  Auto Neutrophil % : x  Auto Lymphocyte % : x  Auto Monocyte % : x  Auto Eosinophil % : x  Auto Basophil % : x    10-07    140  |  105  |  14  ----------------------------<  142<H>  4.1   |  26  |  0.81    Ca    9.0      07 Oct 2021 10:07  Phos  2.3     10-07  Mg     2.1     10-07    TPro  6.1  /  Alb  3.6  /  TBili  1.5<H>  /  DBili  x   /  AST  55<H>  /  ALT  70<H>  /  AlkPhos  144<H>  10-07    PT/INR - ( 07 Oct 2021 10:07 )   PT: 14.8 sec;   INR: 1.25          PTT - ( 07 Oct 2021 10:07 )  PTT:29.5 sec  The current medications were reviewed   MEDICATIONS  (STANDING):  aspirin enteric coated 81 milliGRAM(s) Oral daily  chlorhexidine 2% Cloths 1 Application(s) Topical <User Schedule>  dexMEDEtomidine Infusion 0.2 MICROgram(s)/kG/Hr (4.48 mL/Hr) IV Continuous <Continuous>  dextrose 40% Gel 15 Gram(s) Oral once  dextrose 5%. 1000 milliLiter(s) (50 mL/Hr) IV Continuous <Continuous>  dextrose 5%. 1000 milliLiter(s) (100 mL/Hr) IV Continuous <Continuous>  dextrose 50% Injectable 25 Gram(s) IV Push once  dextrose 50% Injectable 12.5 Gram(s) IV Push once  dextrose 50% Injectable 25 Gram(s) IV Push once  diltiazem    Tablet 60 milliGRAM(s) Oral every 6 hours  furosemide    Tablet 40 milliGRAM(s) Oral daily  glucagon  Injectable 1 milliGRAM(s) IntraMuscular once  heparin  Infusion 700 Unit(s)/Hr (7 mL/Hr) IV Continuous <Continuous>  insulin lispro (ADMELOG) corrective regimen sliding scale   SubCutaneous every 6 hours  levalbuterol Inhalation 0.63 milliGRAM(s) Inhalation every 6 hours  metoprolol tartrate Injectable 5 milliGRAM(s) IV Push every 6 hours  pantoprazole  Injectable 40 milliGRAM(s) IV Push daily  piperacillin/tazobactam IVPB.. 3.375 Gram(s) IV Intermittent every 6 hours  potassium chloride    Tablet ER 20 milliEquivalent(s) Oral daily  sodium chloride 0.9%. 1000 milliLiter(s) (10 mL/Hr) IV Continuous <Continuous>    MEDICATIONS  (PRN):       PROBLEM LIST/ ASSESSMENT:  HEALTH ISSUES - PROBLEM Dx:      ,   Patient is a 71y old  Male who presents with a chief complaint of possible decortication for loculated pleural effusion (07 Oct 2021 10:51)     s/p cardiac surgery            My plan includes :  close hemodynamic, ventilatory and drain monitoring and management per post op routine    Monitor for arrhythmias and monitor parameters for organ perfusion  beta blockade not administered due to hemodynamic instability and bradycardia  monitor neurologic status  Head of the bed should remain elevated to 45 deg .   chest PT and IS will be encouraged  monitor adequacy of oxygenation and ventilation and attempt to wean oxygen  antibiotic regimen will be tailored to the clinical, laboratory and microbiologic data  Nutritional goals will be met using po eventually , ensure adequate caloric intake and montior the same  Stress ulcer and VTE prophylaxis will be achieved    Glycemic control is satisfactory  Electrolytes have been repleted as necessary and wound care has been carried out. Pain control has been achieved.   agressive physical therapy and early mobility and ambulation goals will be met   The family was updated about the course and plan  CRITICAL CARE TIME personally provided by me  in evaluation and management, reassessments, review and interpretation of labs and x-rays, ventilator and hemodynamic management, formulating a plan and coordinating care: ___90____ MIN.  Time does not include procedural time.  CTICU ATTENDING     					    Lex Sierra MD

## 2021-10-07 NOTE — SWALLOW BEDSIDE ASSESSMENT ADULT - COMMENTS
Primarily Maori speaking. Maori  #[phone] via telephone for Eng/Maori translation. Pt received awake & alert, seated OOB to chair, on HFNC.

## 2021-10-07 NOTE — SWALLOW BEDSIDE ASSESSMENT ADULT - SWALLOW EVAL: DIAGNOSIS
Functional oropharyngeal swallow w no overt signs of aspiration or other swallowing impairment on this exam.

## 2021-10-07 NOTE — DIETITIAN INITIAL EVALUATION ADULT. - OTHER INFO
70 yo M w h/o tonsil ca s/p surgery & XRT in 2019, former smoker, HTN, HLD, DM, s/p MVR at [facility] in Sept '21, now adm for subxiphoid pericardial window. S/P bronchoscopy with lung decortication using VATS 10/4.     Pt seen at bedside for initial assessment on NC/HF (lethargic upon interview thus majority of information obtained via EMR;  services used: Katie: [id number]). Pt well known to RD from previous admission. Currently ordered for Precedex. Denies nausea/vomiting at this time. Last documented bowel movement 10/4. Confirms NKFA. Unable to assess PO intake PTA. Dosing weight 197 pounds. Per EMR, 190 pounds (9/23/21). Left/right foot edema 1+. No pressure ulcers documented at this time. Right thoracotomy, right lateral surgical incisions. Arcadio score=18. Labs reviewed 10/7; noted with persistent hypophosphatemia; all other electrolytes within normal limits at this time. Fingersticks 10/6-10/7: 127-205 mg/dL; ISS ordered. Observed pt with no overt signs of muscle or fat wasting. Based on ASPEN guidelines, pt does not meet criteria for malnutrition at this time. Pt currently ordered for Jevity 1.5 @ 45 mL/hr x 24 hours; observed tube feed held at time of RD interview (pending swallow at bedside assessment; per swallow at bedside note "recommended regular consistency with thin liquids). Discussed likelihood of diet advancement to DASH/TLC diet as medically feasible, pt amenable to education. Attempted to reinforce previously taught diet education, however, pt became lethargic thus deferred at this time. Will monitor need for ONS. No cultural, ethnic, Baptism food preferences noted. Made aware RD remains available. RD to follow up per protocol. See nutrition recommendations below.

## 2021-10-07 NOTE — DIETITIAN INITIAL EVALUATION ADULT. - ENTERAL
If EN is warranted continue with Jevity 1.5 @ 45 mL/hr x 24 hours. This provides: 1080 mL, 1620 kcal, 68.9 g protein, 821 mL free water. Meetin kcal/kg, 1.1 g/kg based on IBW 64.4 kg. Defer free water flushes to team.

## 2021-10-07 NOTE — SWALLOW BEDSIDE ASSESSMENT ADULT - SLP PERTINENT HISTORY OF CURRENT PROBLEM
72 yo M w h/o tonsil ca s/p surgery & XRT in 2019, former smoker, HTN, HLD, DM, s/p MVR at [facility] in Sept '21, now adm for subxiphoid pericardial window

## 2021-10-08 LAB
ALBUMIN SERPL ELPH-MCNC: 3.4 G/DL — SIGNIFICANT CHANGE UP (ref 3.3–5)
ALP SERPL-CCNC: 124 U/L — HIGH (ref 40–120)
ALT FLD-CCNC: 57 U/L — HIGH (ref 10–45)
ANION GAP SERPL CALC-SCNC: 8 MMOL/L — SIGNIFICANT CHANGE UP (ref 5–17)
APTT BLD: 39 SEC — HIGH (ref 27.5–35.5)
AST SERPL-CCNC: 38 U/L — SIGNIFICANT CHANGE UP (ref 10–40)
BILIRUB SERPL-MCNC: 1.1 MG/DL — SIGNIFICANT CHANGE UP (ref 0.2–1.2)
BUN SERPL-MCNC: 11 MG/DL — SIGNIFICANT CHANGE UP (ref 7–23)
CALCIUM SERPL-MCNC: 9 MG/DL — SIGNIFICANT CHANGE UP (ref 8.4–10.5)
CHLORIDE SERPL-SCNC: 105 MMOL/L — SIGNIFICANT CHANGE UP (ref 96–108)
CO2 SERPL-SCNC: 26 MMOL/L — SIGNIFICANT CHANGE UP (ref 22–31)
CREAT SERPL-MCNC: 0.78 MG/DL — SIGNIFICANT CHANGE UP (ref 0.5–1.3)
GAS PNL BLDA: SIGNIFICANT CHANGE UP
GLUCOSE BLDC GLUCOMTR-MCNC: 109 MG/DL — HIGH (ref 70–99)
GLUCOSE BLDC GLUCOMTR-MCNC: 116 MG/DL — HIGH (ref 70–99)
GLUCOSE BLDC GLUCOMTR-MCNC: 146 MG/DL — HIGH (ref 70–99)
GLUCOSE BLDC GLUCOMTR-MCNC: 172 MG/DL — HIGH (ref 70–99)
GLUCOSE BLDC GLUCOMTR-MCNC: 244 MG/DL — HIGH (ref 70–99)
GLUCOSE SERPL-MCNC: 117 MG/DL — HIGH (ref 70–99)
HCT VFR BLD CALC: 29.2 % — LOW (ref 39–50)
HGB BLD-MCNC: 9.3 G/DL — LOW (ref 13–17)
INR BLD: 1.18 — HIGH (ref 0.88–1.16)
MAGNESIUM SERPL-MCNC: 2 MG/DL — SIGNIFICANT CHANGE UP (ref 1.6–2.6)
MCHC RBC-ENTMCNC: 28.4 PG — SIGNIFICANT CHANGE UP (ref 27–34)
MCHC RBC-ENTMCNC: 31.8 GM/DL — LOW (ref 32–36)
MCV RBC AUTO: 89.3 FL — SIGNIFICANT CHANGE UP (ref 80–100)
NRBC # BLD: 0 /100 WBCS — SIGNIFICANT CHANGE UP (ref 0–0)
PHOSPHATE SERPL-MCNC: 2.4 MG/DL — LOW (ref 2.5–4.5)
PLATELET # BLD AUTO: 287 K/UL — SIGNIFICANT CHANGE UP (ref 150–400)
POTASSIUM SERPL-MCNC: 4.1 MMOL/L — SIGNIFICANT CHANGE UP (ref 3.5–5.3)
POTASSIUM SERPL-SCNC: 4.1 MMOL/L — SIGNIFICANT CHANGE UP (ref 3.5–5.3)
PROT SERPL-MCNC: 6 G/DL — SIGNIFICANT CHANGE UP (ref 6–8.3)
PROTHROM AB SERPL-ACNC: 14.1 SEC — HIGH (ref 10.6–13.6)
RBC # BLD: 3.27 M/UL — LOW (ref 4.2–5.8)
RBC # FLD: 13.7 % — SIGNIFICANT CHANGE UP (ref 10.3–14.5)
SODIUM SERPL-SCNC: 139 MMOL/L — SIGNIFICANT CHANGE UP (ref 135–145)
SURGICAL PATHOLOGY STUDY: SIGNIFICANT CHANGE UP
WBC # BLD: 7.58 K/UL — SIGNIFICANT CHANGE UP (ref 3.8–10.5)
WBC # FLD AUTO: 7.58 K/UL — SIGNIFICANT CHANGE UP (ref 3.8–10.5)

## 2021-10-08 PROCEDURE — 71045 X-RAY EXAM CHEST 1 VIEW: CPT | Mod: 26,76

## 2021-10-08 PROCEDURE — 99232 SBSQ HOSP IP/OBS MODERATE 35: CPT

## 2021-10-08 PROCEDURE — 93010 ELECTROCARDIOGRAM REPORT: CPT

## 2021-10-08 RX ORDER — OXYCODONE HYDROCHLORIDE 5 MG/1
5 TABLET ORAL EVERY 4 HOURS
Refills: 0 | Status: DISCONTINUED | OUTPATIENT
Start: 2021-10-08 | End: 2021-10-11

## 2021-10-08 RX ORDER — POLYETHYLENE GLYCOL 3350 17 G/17G
17 POWDER, FOR SOLUTION ORAL DAILY
Refills: 0 | Status: DISCONTINUED | OUTPATIENT
Start: 2021-10-08 | End: 2021-10-08

## 2021-10-08 RX ORDER — DIGOXIN 250 MCG
250 TABLET ORAL EVERY 6 HOURS
Refills: 0 | Status: COMPLETED | OUTPATIENT
Start: 2021-10-09 | End: 2021-10-09

## 2021-10-08 RX ORDER — MULTIVIT WITH MIN/MFOLATE/K2 340-15/3 G
1 POWDER (GRAM) ORAL ONCE
Refills: 0 | Status: COMPLETED | OUTPATIENT
Start: 2021-10-08 | End: 2021-10-08

## 2021-10-08 RX ORDER — METOPROLOL TARTRATE 50 MG
25 TABLET ORAL EVERY 6 HOURS
Refills: 0 | Status: DISCONTINUED | OUTPATIENT
Start: 2021-10-08 | End: 2021-10-11

## 2021-10-08 RX ORDER — AMIODARONE HYDROCHLORIDE 400 MG/1
150 TABLET ORAL ONCE
Refills: 0 | Status: COMPLETED | OUTPATIENT
Start: 2021-10-08 | End: 2021-10-08

## 2021-10-08 RX ORDER — METOPROLOL TARTRATE 50 MG
2.5 TABLET ORAL ONCE
Refills: 0 | Status: COMPLETED | OUTPATIENT
Start: 2021-10-08 | End: 2021-10-08

## 2021-10-08 RX ORDER — AMIODARONE HYDROCHLORIDE 400 MG/1
400 TABLET ORAL ONCE
Refills: 0 | Status: DISCONTINUED | OUTPATIENT
Start: 2021-10-08 | End: 2021-10-08

## 2021-10-08 RX ORDER — PANTOPRAZOLE SODIUM 20 MG/1
40 TABLET, DELAYED RELEASE ORAL
Refills: 0 | Status: DISCONTINUED | OUTPATIENT
Start: 2021-10-08 | End: 2021-10-11

## 2021-10-08 RX ORDER — SODIUM CHLORIDE 9 MG/ML
3 INJECTION INTRAMUSCULAR; INTRAVENOUS; SUBCUTANEOUS EVERY 8 HOURS
Refills: 0 | Status: DISCONTINUED | OUTPATIENT
Start: 2021-10-08 | End: 2021-10-11

## 2021-10-08 RX ORDER — METOPROLOL TARTRATE 50 MG
12.5 TABLET ORAL EVERY 6 HOURS
Refills: 0 | Status: DISCONTINUED | OUTPATIENT
Start: 2021-10-08 | End: 2021-10-08

## 2021-10-08 RX ORDER — ATORVASTATIN CALCIUM 80 MG/1
20 TABLET, FILM COATED ORAL AT BEDTIME
Refills: 0 | Status: DISCONTINUED | OUTPATIENT
Start: 2021-10-08 | End: 2021-10-11

## 2021-10-08 RX ORDER — METOPROLOL TARTRATE 50 MG
5 TABLET ORAL ONCE
Refills: 0 | Status: COMPLETED | OUTPATIENT
Start: 2021-10-08 | End: 2021-10-08

## 2021-10-08 RX ORDER — DIGOXIN 250 MCG
500 TABLET ORAL ONCE
Refills: 0 | Status: COMPLETED | OUTPATIENT
Start: 2021-10-08 | End: 2021-10-08

## 2021-10-08 RX ADMIN — LEVALBUTEROL 0.63 MILLIGRAM(S): 1.25 SOLUTION, CONCENTRATE RESPIRATORY (INHALATION) at 05:47

## 2021-10-08 RX ADMIN — LEVALBUTEROL 0.63 MILLIGRAM(S): 1.25 SOLUTION, CONCENTRATE RESPIRATORY (INHALATION) at 19:13

## 2021-10-08 RX ADMIN — POLYETHYLENE GLYCOL 3350 17 GRAM(S): 17 POWDER, FOR SOLUTION ORAL at 12:47

## 2021-10-08 RX ADMIN — Medication 100 MILLIEQUIVALENT(S): at 00:10

## 2021-10-08 RX ADMIN — Medication 25 MILLIGRAM(S): at 19:13

## 2021-10-08 RX ADMIN — AMIODARONE HYDROCHLORIDE 133.33 MILLIGRAM(S): 400 TABLET ORAL at 11:15

## 2021-10-08 RX ADMIN — Medication 20 MILLIEQUIVALENT(S): at 12:47

## 2021-10-08 RX ADMIN — Medication 5 MILLIGRAM(S): at 05:10

## 2021-10-08 RX ADMIN — Medication 81 MILLIGRAM(S): at 12:47

## 2021-10-08 RX ADMIN — CHLORHEXIDINE GLUCONATE 1 APPLICATION(S): 213 SOLUTION TOPICAL at 05:00

## 2021-10-08 RX ADMIN — Medication 5 MILLIGRAM(S): at 10:05

## 2021-10-08 RX ADMIN — ATORVASTATIN CALCIUM 20 MILLIGRAM(S): 80 TABLET, FILM COATED ORAL at 21:56

## 2021-10-08 RX ADMIN — SODIUM CHLORIDE 3 MILLILITER(S): 9 INJECTION INTRAMUSCULAR; INTRAVENOUS; SUBCUTANEOUS at 16:47

## 2021-10-08 RX ADMIN — PIPERACILLIN AND TAZOBACTAM 200 GRAM(S): 4; .5 INJECTION, POWDER, LYOPHILIZED, FOR SOLUTION INTRAVENOUS at 12:47

## 2021-10-08 RX ADMIN — Medication 25 MILLIGRAM(S): at 12:47

## 2021-10-08 RX ADMIN — PIPERACILLIN AND TAZOBACTAM 200 GRAM(S): 4; .5 INJECTION, POWDER, LYOPHILIZED, FOR SOLUTION INTRAVENOUS at 23:36

## 2021-10-08 RX ADMIN — SODIUM CHLORIDE 3 MILLILITER(S): 9 INJECTION INTRAMUSCULAR; INTRAVENOUS; SUBCUTANEOUS at 21:56

## 2021-10-08 RX ADMIN — Medication 60 MILLIGRAM(S): at 12:47

## 2021-10-08 RX ADMIN — Medication 60 MILLIGRAM(S): at 04:54

## 2021-10-08 RX ADMIN — LEVALBUTEROL 0.63 MILLIGRAM(S): 1.25 SOLUTION, CONCENTRATE RESPIRATORY (INHALATION) at 12:48

## 2021-10-08 RX ADMIN — Medication 25 MILLIGRAM(S): at 23:36

## 2021-10-08 RX ADMIN — PIPERACILLIN AND TAZOBACTAM 200 GRAM(S): 4; .5 INJECTION, POWDER, LYOPHILIZED, FOR SOLUTION INTRAVENOUS at 05:10

## 2021-10-08 RX ADMIN — Medication 2.5 MILLIGRAM(S): at 21:55

## 2021-10-08 RX ADMIN — Medication 4: at 13:01

## 2021-10-08 RX ADMIN — Medication 500 MICROGRAM(S): at 23:36

## 2021-10-08 RX ADMIN — Medication 40 MILLIGRAM(S): at 05:10

## 2021-10-08 RX ADMIN — PIPERACILLIN AND TAZOBACTAM 200 GRAM(S): 4; .5 INJECTION, POWDER, LYOPHILIZED, FOR SOLUTION INTRAVENOUS at 19:13

## 2021-10-08 NOTE — PROGRESS NOTE ADULT - ASSESSMENT
A/P:        Neurovascular:   - No delirium. Pain well controlled with current regimen.  -Continue tylenol PRN    Cardiovascular:   - POD_ s/p _  -Hemodynamically stable. HR controlled (X-X)  - Hx of HTN, BP controlled (X-X)  - ASA, Plavix, BB, statin  - EKG. TTE. Cardiac Panel. Lipid Panel. BNP.      Respiratory:   - 02 Sat = 98% on RA.  -If on oxygen wean to RA from for O2 Sat > 93%.  -Encourage C+DB and Use of IS 10x / hr while awake.  -CXR.    GI:   - Stable.  -NPO after MN.  -Continue GI PPX with protonix  -PO Diet.    Renal / :  - BUN/Cr stable at X/X  -Continue to monitor renal function.  -Monitor I/O's.  - Replete electrolytes PRN    Endocrine:    -A1c.  -TSH.    Hematologic:  -H/H stable at X/X with no obvious signs of bleeding  -Coagulation Panel.    ID:  -Pt remains afebrile with no elevation in WBC or signs of infection  -Continue to monitor CBC  -Observe for SIRS/Sepsis Syndrome.    Prophylaxis:  -DVT prophylaxis with 5000 SubQ Heparin q8h.  -SCD's    Disposition:  -Home when medically appropriate.   A/P:    71 y.o Vietnamese/English speaking male, former smoker, with PMHx severe mitral valve insufficiency who underwent MVR with Dr. Jarquin on 9/20/21, atrial fibrillation, HFrEF (30%)   CAD (s/p PCI   70 yo Vietnamese/English speaking male, former smoker w/ PMHx of severe mitral valve insufficiency (recent MV repair 9/20/21), atrial fibrillation, HFrEF (30%), CAD (s/p PCI 2018), HTN, HLD, DM, tonsil cancer s/p surgery + radiation in 2019 who presented to [facility] with acute SOB.  At the time, he denied chest pain, palpitations, diaphoresis. No F/C/NS/N/V/D. No cough, calf tenderness or increased LE edema.  Previous hospital course here at [facility] post MVR was complicated by right hemothorax requiring thoracocentesis (dry tap) and post op afib, went home on amio maintenance and BB, ultimately discharged home on 9/25/21.     In the ED at [facility], patient was initially hypertensive, although became hypotensive with SBP 90s s/p nitroglycerin. Levophed was started for pressor support.     Patient admitted to CICU;  TTE showed "Severe LV systolic fxn, EF 29%, large pericardial effusion posterior and lateral to the LV (2.5cm), the pericardial effusion measures 1.2cm adjacent to the RV, reduced diastolic expansion of the apical free wall of the RV, however diagnosis of tamponade limited by ". Findings on TTE were significant for pericardial tamponade. IR was consulted and a CT chest w/out contrast was completed. After quick review, IR stated it is not drainable.     Dr. Pena took the patient to the OR on 10/2/21 for a subxiphoid pericardial window, draining 500cc of fluid with relief of tamponade and placement of pericardial drain. Patient's HR was still aflutter 120s-130s, and an Amiodarone drip was initiated with improvement of HR to 60s. Patient was stable for transfer here today for continued management of his pericardial drain and evaluation by thoracic surgeons for possible complex decortication of right lung.  Currently he remains intubated and sedated.        Neurovascular:   - No delirium. Pain well controlled with current regimen.  -Continue tylenol PRN    Cardiovascular:   - POD_ s/p _  -Hemodynamically stable. HR controlled (X-X)  - Hx of HTN, BP controlled (X-X)  - ASA, Plavix, BB, statin  - EKG. TTE. Cardiac Panel. Lipid Panel. BNP.      Respiratory:   - 02 Sat = 98% on RA.  -If on oxygen wean to RA from for O2 Sat > 93%.  -Encourage C+DB and Use of IS 10x / hr while awake.  -CXR.    GI:   - Stable.  -NPO after MN.  -Continue GI PPX with protonix  -PO Diet.    Renal / :  - BUN/Cr stable at X/X  -Continue to monitor renal function.  -Monitor I/O's.  - Replete electrolytes PRN    Endocrine:    -A1c.  -TSH.    Hematologic:  -H/H stable at X/X with no obvious signs of bleeding  -Coagulation Panel.    ID:  -Pt remains afebrile with no elevation in WBC or signs of infection  -Continue to monitor CBC  -Observe for SIRS/Sepsis Syndrome.    Prophylaxis:  -DVT prophylaxis with 5000 SubQ Heparin q8h.  -SCD's    Disposition:  -Home when medically appropriate.   A/P:    71 y.o Hebrew/English speaking male, former smoker, with PMHx severe mitral valve insufficiency who underwent MVR with Dr. Jarquin on 9/20/21, atrial fibrillation, HFrEF (30%), CAD (s/p PCI 2018), HTN, HLD, DM, tonsil cancer s/p surgical intervention and radiation (2019), who presented to [facility] 10/1 with acute SOB. Previous hospital course s/p MVR was complicated by right hemothorax requiring thoracentesis (dry tap) and post op AF. At [facility] ED he was noted to be hypotensive with SBP in the 90s. TTE revealed large pericardial effusion with signs of tamponade. Dr. Pena took the patient to the OR on 10/2 at [facility] for a subxiphoid pericardial window, draining 500 cc of fluid with relief od tamponade. OR was uncomplicated. On 10/3 he was [facility] intubated and sedated under the care of Dr. Jarquin for further care. On 10/4/21 he underwent an uncomplicated R VATS decortication, evacuation of hemothorax. Post operatively he arrived back to the CTICU intubated with 3 chest tubes in place. He was noted to be hypoxic on arrival but FiO2 weaned to 80% by the end of the day. Intermittent AF with RVR, amiodarone gtt started. POD1 he underwent bedside bronchoscopy which revealed purulent sputum and antibiotics were started. POD2 he was extubated after repeat bronchoscopy. Pericardial drain removed. POD3 patient with continued AF with intermittent RVR. POD4 patient was transferred to [facility] still in AF, rate controlled with intermittent RVR.     Neurovascular:   - No delirium. Pain well controlled with current regimen.  -Continue oxy IR PRN    Cardiovascular:   - S/p MVR on 9/1, readmitted with pericardial effusion and RTOR for R VATS, RA decortication for hemothorax  -Hemodynamically stable. HR (). Continued AF with intermittent periods of RVR. EPS following, continue to appreciate recs. Continue metoprolol 25 mg q6 hours       - Can consider amio PO if patient has continued RVR       - Cardizem discontinued given low EF per EPS recs        - Continue heparin gtt  - CAD s/p PCI: continue ASA, atorvastatin 20 mg daily   - Hx of HTN, BP controlled (136//70)    Respiratory:   - POD 4 s/p R VATS, RA decortication, evacuation of hemothorax   - 1 CT removed today, 1 CT remaining   - 02 Sat = 98% on 6L NC  -If on oxygen wean to RA from for O2 Sat > 93%.  -Encourage C+DB and Use of IS 10x / hr while awake.  -CXR with stable right sided opacity   - Continue xopenex inhalers     GI:   -Continue GI PPX with protonix  -PO Diet.  - Bowel reg: continue miralax, dulcolax, senna, mag citrate as needed     Renal / :  - BUN/Cr stable at 11/0.78  -Continue to monitor renal function.  -Monitor I/O's.  - Replete electrolytes PRN  - Continue lasix 40 mg with K+ 20 mg     Endocrine:    -A1c 5.9, known hx DM, continue MISS  -TSH 0.743, no known hx thyroid disease     Hematologic:  -H/H stable at  9.3/29.2 with no obvious signs of bleeding  -Coagulation Panel.  - Heparin gtt for AF    ID:  -Pt remains afebrile with no elevation in WBC or signs of infection  - Continue zosyn 3.375 q6 hours for purulent sputum   -Continue to monitor CBC  -Observe for SIRS/Sepsis Syndrome.    Prophylaxis:  -DVT prophylaxis with heparin gtt  -SCD's    Disposition:  -Home when medically appropriate.

## 2021-10-08 NOTE — PROGRESS NOTE ADULT - SUBJECTIVE AND OBJECTIVE BOX
Patient discussed on morning rounds with Dr. Jarquin    Operation / Date: 10/4/21 R VATS RA decortication, evacuation of hemothorax  9/1/21 MV repair    SUBJECTIVE ASSESSMENT:  71y Male assessed at bedside this morning after transfer from [facility]ICU. Patient states he feels better. Denies significant pain. Does still have some SOB but it is improving. Ambulating on the floor. Using his IS and pulling 1750 cc (wife very encouraging). No BM for 7 days, denies flatus today, denies abdominal pain. Denies fever, chills, nausea, vomiting.     Vital Signs Last 24 Hrs  T(C): 36.1 (08 Oct 2021 05:01), Max: 36.1 (07 Oct 2021 17:38)  T(F): 96.9 (08 Oct 2021 05:01), Max: 97 (07 Oct 2021 17:38)  HR: 137 (08 Oct 2021 10:56) (67 - 141)  BP: 103/73 (08 Oct 2021 10:56) (103/73 - 122/82)  BP(mean): 83 (08 Oct 2021 10:56) (83 - 95)  RR: 16 (08 Oct 2021 10:56) (16 - 20)  SpO2: 95% (08 Oct 2021 10:56) (94% - 100%)  I&O's Detail    07 Oct 2021 07:01  -  08 Oct 2021 07:00  --------------------------------------------------------  IN:    Diltiazem: 45 mL    Heparin: 107 mL    IV PiggyBack: 100 mL    IV PiggyBack: 400 mL    Oral Fluid: 600 mL    sodium chloride 0.9%: 225 mL  Total IN: 1477 mL    OUT:    Chest Tube (mL): 40 mL    Chest Tube (mL): 0 mL    Chest Tube (mL): 25 mL    Indwelling Catheter - Urethral (mL): 2545 mL  Total OUT: 2610 mL    Total NET: -1133 mL      08 Oct 2021 07:01  -  08 Oct 2021 14:17  --------------------------------------------------------  IN:    sodium chloride 0.9%: 30 mL  Total IN: 30 mL    OUT:    Chest Tube (mL): 5 mL    Chest Tube (mL): 20 mL    Indwelling Catheter - Urethral (mL): 425 mL  Total OUT: 450 mL    Total NET: -420 mL          CHEST TUBE:  Yes X1. AIR LEAKS: No. H2O SEAL.   ZACARIAS DRAIN:  No.  EPICARDIAL WIRES: No.  TIE DOWNS: No.  WARNER: No.    Physical Exam  CONSTITUTIONAL: Well appearing in NAD assessed laying comfortably in bed   NEURO: A&OX3. No focal deficits noted, moving bilateral upper and lower extremities                    CV: RRR, no murmurs, rubs, gallops  RESPIRATORY: Coarse breath sounds right side, no wheezes, rales, rhonchi   GI: +BS, NT/ND  MUSKULOSKELETAL: No peripheral edema or calf tenderness. Full strength and ROM bilateral upper and lower extremities   VASCULAR: Bilateral distal pulses 2+  INCISIONS: R mini thoracotomy clean, dry, intact, healing well. Subxiphoid incision clean, dry, intact, healing well. R VATS incisions clean, dry, intact.     LABS:                        9.3    7.58  )-----------( 287      ( 08 Oct 2021 04:59 )             29.2       COUMADIN:  No.    PT/INR - ( 08 Oct 2021 04:59 )   PT: 14.1 sec;   INR: 1.18          PTT - ( 08 Oct 2021 04:59 )  PTT:39.0 sec    10-08    139  |  105  |  11  ----------------------------<  117<H>  4.1   |  26  |  0.78    Ca    9.0      08 Oct 2021 04:59  Phos  2.4     10-08  Mg     2.0     10-08    TPro  6.0  /  Alb  3.4  /  TBili  1.1  /  DBili  x   /  AST  38  /  ALT  57<H>  /  AlkPhos  124<H>  10-08    MEDICATIONS  (STANDING):  aspirin enteric coated 81 milliGRAM(s) Oral daily  atorvastatin 20 milliGRAM(s) Oral at bedtime  bisacodyl Suppository 10 milliGRAM(s) Rectal once  chlorhexidine 2% Cloths 1 Application(s) Topical <User Schedule>  dextrose 40% Gel 15 Gram(s) Oral once  dextrose 5%. 1000 milliLiter(s) (50 mL/Hr) IV Continuous <Continuous>  dextrose 5%. 1000 milliLiter(s) (100 mL/Hr) IV Continuous <Continuous>  dextrose 50% Injectable 25 Gram(s) IV Push once  dextrose 50% Injectable 12.5 Gram(s) IV Push once  dextrose 50% Injectable 25 Gram(s) IV Push once  diltiazem    Tablet 60 milliGRAM(s) Oral every 6 hours  furosemide    Tablet 40 milliGRAM(s) Oral daily  glucagon  Injectable 1 milliGRAM(s) IntraMuscular once  heparin  Infusion 700 Unit(s)/Hr (7 mL/Hr) IV Continuous <Continuous>  insulin lispro (ADMELOG) corrective regimen sliding scale   SubCutaneous every 6 hours  levalbuterol Inhalation 0.63 milliGRAM(s) Inhalation every 6 hours  magnesium citrate Oral Solution 1 Bottle Oral once  metoprolol tartrate 25 milliGRAM(s) Oral every 6 hours  pantoprazole    Tablet 40 milliGRAM(s) Oral before breakfast  piperacillin/tazobactam IVPB.. 3.375 Gram(s) IV Intermittent every 6 hours  polyethylene glycol 3350 17 Gram(s) Oral daily  potassium chloride    Tablet ER 20 milliEquivalent(s) Oral daily  senna 2 Tablet(s) Oral at bedtime  sodium chloride 0.9% lock flush 3 milliLiter(s) IV Push every 8 hours  sodium chloride 0.9%. 1000 milliLiter(s) (10 mL/Hr) IV Continuous <Continuous>    MEDICATIONS  (PRN):  oxyCODONE    IR 5 milliGRAM(s) Oral every 4 hours PRN Moderate Pain (4 - 6)        RADIOLOGY & ADDITIONAL TESTS:    < from: Xray Chest 1 View- PORTABLE-Routine (Xray Chest 1 View- PORTABLE-Routine in AM.) (10.07.21 @ 04:56) >  FINDINGS:    Single frontal view of the chest demonstrates right lower lobe airspace disease/effusion, unchanged. Support devices are unchanged. The cardiomediastinal silhouette is enlarged. No acute osseous abnormalities. Overlying EKG leads and wires are noted. Left lower lobe airspace disease, unchanged.    IMPRESSION: No interval change.    < end of copied text >

## 2021-10-08 NOTE — PROGRESS NOTE ADULT - SUBJECTIVE AND OBJECTIVE BOX
EPS Progress Note    S: OOB in a chair, NAD, feels better     MEDICATIONS  (STANDING):  aMIOdarone IVPB 150 milliGRAM(s) IV Intermittent once  aspirin enteric coated 81 milliGRAM(s) Oral daily  atorvastatin 20 milliGRAM(s) Oral at bedtime  bisacodyl Suppository 10 milliGRAM(s) Rectal once  chlorhexidine 2% Cloths 1 Application(s) Topical <User Schedule>  dextrose 40% Gel 15 Gram(s) Oral once  dextrose 5%. 1000 milliLiter(s) (50 mL/Hr) IV Continuous <Continuous>  dextrose 5%. 1000 milliLiter(s) (100 mL/Hr) IV Continuous <Continuous>  dextrose 50% Injectable 25 Gram(s) IV Push once  dextrose 50% Injectable 12.5 Gram(s) IV Push once  dextrose 50% Injectable 25 Gram(s) IV Push once  diltiazem    Tablet 60 milliGRAM(s) Oral every 6 hours  furosemide    Tablet 40 milliGRAM(s) Oral daily  glucagon  Injectable 1 milliGRAM(s) IntraMuscular once  heparin  Infusion 700 Unit(s)/Hr (7 mL/Hr) IV Continuous <Continuous>  insulin lispro (ADMELOG) corrective regimen sliding scale   SubCutaneous every 6 hours  levalbuterol Inhalation 0.63 milliGRAM(s) Inhalation every 6 hours  magnesium citrate Oral Solution 1 Bottle Oral once  metoprolol tartrate 25 milliGRAM(s) Oral every 6 hours  pantoprazole    Tablet 40 milliGRAM(s) Oral before breakfast  piperacillin/tazobactam IVPB.. 3.375 Gram(s) IV Intermittent every 6 hours  polyethylene glycol 3350 17 Gram(s) Oral daily  potassium chloride    Tablet ER 20 milliEquivalent(s) Oral daily  senna 2 Tablet(s) Oral at bedtime  sodium chloride 0.9% lock flush 3 milliLiter(s) IV Push every 8 hours  sodium chloride 0.9%. 1000 milliLiter(s) (10 mL/Hr) IV Continuous <Continuous>      Telemetry: atrial flutter           General:  NAD        HEENT:  PERRL, EOMI	  Neck: Supple, - JVD;   Cardiovascular:  S1 S2, No JVD  Respiratory: CTA B/L       Gastrointestinal:  Soft, Non-tender, + BS	  Skin: No rashes, No ecchymoses, No cyanosis  Extremities: No edema  Psychiatry: A & O x 3    < from: TTE Echo Complete w/o Contrast w/ Doppler (10.07.21 @ 13:00) >  1. Limited study obtained for evaluation of pericardial effusion.   2. Severely reduced left ventricular systolic function.   3. Regional wall motion abnormalities consistent with ischemic heart disease.   4. No pericardial effusion.           Labs:                                                               9.3    7.58  )-----------( 287      ( 08 Oct 2021 04:59 )             29.2     10-08    139  |  105  |  11  ----------------------------<  117<H>  4.1   |  26  |  0.78    Ca    9.0      08 Oct 2021 04:59  Phos  2.4     10-08  Mg     2.0     10-08    TPro  6.0  /  Alb  3.4  /  TBili  1.1  /  DBili  x   /  AST  38  /  ALT  57<H>  /  AlkPhos  124<H>  10-08    PT/INR - ( 08 Oct 2021 04:59 )   PT: 14.1 sec;   INR: 1.18          PTT - ( 08 Oct 2021 04:59 )  PTT:39.0 sec    Assessment/Plan:  70 yo Wol[place]/English speaking male, with history of severe mitral valve insufficiency (recent MV repair 9/20/21), atrial fibrillation, HFrEF (30%), CAD (s/p PCI 2018), HTN, HLD, DM, tonsil cancer s/p surgery + radiation in 2019 who presented to [facility] with acute SOB.  Patient was recently discharged from [facility] after  MVR complicated by right hemothorax requiring thoracocentesis (dry tap) and post op afib, he was started on amiodarone  and BB, and  discharged home on 9/25/21. Patient had pericardial window with placement of pericardial drain on 102/21, transferred to [facility] and had VATS on 10/4/21.  Patient clinically improved,  HR is better controlled, but still with episodes of breakthrough RVR, restarted on heparin, would discontinue diltiazem secondary to decreased LF function.     EPS Progress Note    S: OOB in a chair, NAD, feels better     MEDICATIONS  (STANDING):  aMIOdarone IVPB 150 milliGRAM(s) IV Intermittent once  aspirin enteric coated 81 milliGRAM(s) Oral daily  atorvastatin 20 milliGRAM(s) Oral at bedtime  bisacodyl Suppository 10 milliGRAM(s) Rectal once  chlorhexidine 2% Cloths 1 Application(s) Topical <User Schedule>  dextrose 40% Gel 15 Gram(s) Oral once  dextrose 5%. 1000 milliLiter(s) (50 mL/Hr) IV Continuous <Continuous>  dextrose 5%. 1000 milliLiter(s) (100 mL/Hr) IV Continuous <Continuous>  dextrose 50% Injectable 25 Gram(s) IV Push once  dextrose 50% Injectable 12.5 Gram(s) IV Push once  dextrose 50% Injectable 25 Gram(s) IV Push once  diltiazem    Tablet 60 milliGRAM(s) Oral every 6 hours  furosemide    Tablet 40 milliGRAM(s) Oral daily  glucagon  Injectable 1 milliGRAM(s) IntraMuscular once  heparin  Infusion 700 Unit(s)/Hr (7 mL/Hr) IV Continuous <Continuous>  insulin lispro (ADMELOG) corrective regimen sliding scale   SubCutaneous every 6 hours  levalbuterol Inhalation 0.63 milliGRAM(s) Inhalation every 6 hours  magnesium citrate Oral Solution 1 Bottle Oral once  metoprolol tartrate 25 milliGRAM(s) Oral every 6 hours  pantoprazole    Tablet 40 milliGRAM(s) Oral before breakfast  piperacillin/tazobactam IVPB.. 3.375 Gram(s) IV Intermittent every 6 hours  polyethylene glycol 3350 17 Gram(s) Oral daily  potassium chloride    Tablet ER 20 milliEquivalent(s) Oral daily  senna 2 Tablet(s) Oral at bedtime  sodium chloride 0.9% lock flush 3 milliLiter(s) IV Push every 8 hours  sodium chloride 0.9%. 1000 milliLiter(s) (10 mL/Hr) IV Continuous <Continuous>      Telemetry: atrial flutter           General:  NAD        HEENT:  PERRL, EOMI	  Neck: Supple, - JVD;   Cardiovascular:  S1 S2, No JVD  Respiratory: CTA B/L       Gastrointestinal:  Soft, Non-tender, + BS	  Skin: No rashes, No ecchymoses, No cyanosis  Extremities: No edema  Psychiatry: A & O x 3    < from: TTE Echo Complete w/o Contrast w/ Doppler (10.07.21 @ 13:00) >  1. Limited study obtained for evaluation of pericardial effusion.   2. Severely reduced left ventricular systolic function.   3. Regional wall motion abnormalities consistent with ischemic heart disease.   4. No pericardial effusion.           Labs:                                                               9.3    7.58  )-----------( 287      ( 08 Oct 2021 04:59 )             29.2     10-08    139  |  105  |  11  ----------------------------<  117<H>  4.1   |  26  |  0.78    Ca    9.0      08 Oct 2021 04:59  Phos  2.4     10-08  Mg     2.0     10-08    TPro  6.0  /  Alb  3.4  /  TBili  1.1  /  DBili  x   /  AST  38  /  ALT  57<H>  /  AlkPhos  124<H>  10-08    PT/INR - ( 08 Oct 2021 04:59 )   PT: 14.1 sec;   INR: 1.18          PTT - ( 08 Oct 2021 04:59 )  PTT:39.0 sec    Assessment/Plan:  72 yo Albanian/English speaking male, with history of severe mitral valve insufficiency (recent MV repair 9/20/21), atrial fibrillation, HFrEF (30%), CAD (s/p PCI 2018), HTN, HLD, DM, tonsil cancer s/p surgery + radiation in 2019 who presented to [facility] with acute SOB.  Patient was recently discharged from [facility] after  MVR complicated by right hemothorax requiring thoracocentesis (dry tap) and post op afib, he was started on amiodarone  and BB, and  discharged home on 9/25/21. Patient had pericardial window with placement of pericardial drain on 102/21, transferred to [facility] and had VATS on 10/4/21.  Patient clinically improved,  HR is better controlled, but still with episodes of breakthrough RVR, restarted on heparin, would discontinue diltiazem secondary to decreased LF function. May consider restart Amiodarone po.

## 2021-10-09 LAB
ALBUMIN SERPL ELPH-MCNC: 3.6 G/DL — SIGNIFICANT CHANGE UP (ref 3.3–5)
ALP SERPL-CCNC: 125 U/L — HIGH (ref 40–120)
ALT FLD-CCNC: 47 U/L — HIGH (ref 10–45)
ANION GAP SERPL CALC-SCNC: 10 MMOL/L — SIGNIFICANT CHANGE UP (ref 5–17)
ANION GAP SERPL CALC-SCNC: 12 MMOL/L — SIGNIFICANT CHANGE UP (ref 5–17)
APTT BLD: 25.3 SEC — LOW (ref 27.5–35.5)
APTT BLD: 33.3 SEC — SIGNIFICANT CHANGE UP (ref 27.5–35.5)
AST SERPL-CCNC: 33 U/L — SIGNIFICANT CHANGE UP (ref 10–40)
BASOPHILS # BLD AUTO: 0.07 K/UL — SIGNIFICANT CHANGE UP (ref 0–0.2)
BASOPHILS NFR BLD AUTO: 0.8 % — SIGNIFICANT CHANGE UP (ref 0–2)
BILIRUB SERPL-MCNC: 1 MG/DL — SIGNIFICANT CHANGE UP (ref 0.2–1.2)
BUN SERPL-MCNC: 15 MG/DL — SIGNIFICANT CHANGE UP (ref 7–23)
BUN SERPL-MCNC: 15 MG/DL — SIGNIFICANT CHANGE UP (ref 7–23)
CALCIUM SERPL-MCNC: 8.8 MG/DL — SIGNIFICANT CHANGE UP (ref 8.4–10.5)
CALCIUM SERPL-MCNC: 8.8 MG/DL — SIGNIFICANT CHANGE UP (ref 8.4–10.5)
CHLORIDE SERPL-SCNC: 101 MMOL/L — SIGNIFICANT CHANGE UP (ref 96–108)
CHLORIDE SERPL-SCNC: 101 MMOL/L — SIGNIFICANT CHANGE UP (ref 96–108)
CK MB CFR SERPL CALC: 2.3 NG/ML — SIGNIFICANT CHANGE UP (ref 0–6.7)
CK SERPL-CCNC: 103 U/L — SIGNIFICANT CHANGE UP (ref 30–200)
CO2 SERPL-SCNC: 21 MMOL/L — LOW (ref 22–31)
CO2 SERPL-SCNC: 25 MMOL/L — SIGNIFICANT CHANGE UP (ref 22–31)
CREAT SERPL-MCNC: 0.79 MG/DL — SIGNIFICANT CHANGE UP (ref 0.5–1.3)
CREAT SERPL-MCNC: 0.87 MG/DL — SIGNIFICANT CHANGE UP (ref 0.5–1.3)
EOSINOPHIL # BLD AUTO: 0.38 K/UL — SIGNIFICANT CHANGE UP (ref 0–0.5)
EOSINOPHIL NFR BLD AUTO: 4.6 % — SIGNIFICANT CHANGE UP (ref 0–6)
GLUCOSE BLDC GLUCOMTR-MCNC: 127 MG/DL — HIGH (ref 70–99)
GLUCOSE BLDC GLUCOMTR-MCNC: 149 MG/DL — HIGH (ref 70–99)
GLUCOSE BLDC GLUCOMTR-MCNC: 156 MG/DL — HIGH (ref 70–99)
GLUCOSE BLDC GLUCOMTR-MCNC: 166 MG/DL — HIGH (ref 70–99)
GLUCOSE BLDC GLUCOMTR-MCNC: 210 MG/DL — HIGH (ref 70–99)
GLUCOSE BLDC GLUCOMTR-MCNC: 99 MG/DL — SIGNIFICANT CHANGE UP (ref 70–99)
GLUCOSE SERPL-MCNC: 122 MG/DL — HIGH (ref 70–99)
GLUCOSE SERPL-MCNC: 134 MG/DL — HIGH (ref 70–99)
HCT VFR BLD CALC: 34.2 % — LOW (ref 39–50)
HCT VFR BLD CALC: 35.3 % — LOW (ref 39–50)
HGB BLD-MCNC: 10.7 G/DL — LOW (ref 13–17)
HGB BLD-MCNC: 11 G/DL — LOW (ref 13–17)
IMM GRANULOCYTES NFR BLD AUTO: 0.8 % — SIGNIFICANT CHANGE UP (ref 0–1.5)
INR BLD: 1.16 — SIGNIFICANT CHANGE UP (ref 0.88–1.16)
LYMPHOCYTES # BLD AUTO: 0.7 K/UL — LOW (ref 1–3.3)
LYMPHOCYTES # BLD AUTO: 8.4 % — LOW (ref 13–44)
MAGNESIUM SERPL-MCNC: 2 MG/DL — SIGNIFICANT CHANGE UP (ref 1.6–2.6)
MAGNESIUM SERPL-MCNC: 2 MG/DL — SIGNIFICANT CHANGE UP (ref 1.6–2.6)
MCHC RBC-ENTMCNC: 27.8 PG — SIGNIFICANT CHANGE UP (ref 27–34)
MCHC RBC-ENTMCNC: 28.1 PG — SIGNIFICANT CHANGE UP (ref 27–34)
MCHC RBC-ENTMCNC: 31.2 GM/DL — LOW (ref 32–36)
MCHC RBC-ENTMCNC: 31.3 GM/DL — LOW (ref 32–36)
MCV RBC AUTO: 89.1 FL — SIGNIFICANT CHANGE UP (ref 80–100)
MCV RBC AUTO: 89.8 FL — SIGNIFICANT CHANGE UP (ref 80–100)
MONOCYTES # BLD AUTO: 0.95 K/UL — HIGH (ref 0–0.9)
MONOCYTES NFR BLD AUTO: 11.4 % — SIGNIFICANT CHANGE UP (ref 2–14)
NEUTROPHILS # BLD AUTO: 6.13 K/UL — SIGNIFICANT CHANGE UP (ref 1.8–7.4)
NEUTROPHILS NFR BLD AUTO: 74 % — SIGNIFICANT CHANGE UP (ref 43–77)
NRBC # BLD: 0 /100 WBCS — SIGNIFICANT CHANGE UP (ref 0–0)
NRBC # BLD: 0 /100 WBCS — SIGNIFICANT CHANGE UP (ref 0–0)
PHOSPHATE SERPL-MCNC: 2.5 MG/DL — SIGNIFICANT CHANGE UP (ref 2.5–4.5)
PLATELET # BLD AUTO: 302 K/UL — SIGNIFICANT CHANGE UP (ref 150–400)
PLATELET # BLD AUTO: 324 K/UL — SIGNIFICANT CHANGE UP (ref 150–400)
POTASSIUM SERPL-MCNC: 3.8 MMOL/L — SIGNIFICANT CHANGE UP (ref 3.5–5.3)
POTASSIUM SERPL-MCNC: 4.2 MMOL/L — SIGNIFICANT CHANGE UP (ref 3.5–5.3)
POTASSIUM SERPL-SCNC: 3.8 MMOL/L — SIGNIFICANT CHANGE UP (ref 3.5–5.3)
POTASSIUM SERPL-SCNC: 4.2 MMOL/L — SIGNIFICANT CHANGE UP (ref 3.5–5.3)
PROT SERPL-MCNC: 6.4 G/DL — SIGNIFICANT CHANGE UP (ref 6–8.3)
PROTHROM AB SERPL-ACNC: 13.8 SEC — HIGH (ref 10.6–13.6)
RBC # BLD: 3.81 M/UL — LOW (ref 4.2–5.8)
RBC # BLD: 3.96 M/UL — LOW (ref 4.2–5.8)
RBC # FLD: 13.9 % — SIGNIFICANT CHANGE UP (ref 10.3–14.5)
RBC # FLD: 13.9 % — SIGNIFICANT CHANGE UP (ref 10.3–14.5)
SODIUM SERPL-SCNC: 134 MMOL/L — LOW (ref 135–145)
SODIUM SERPL-SCNC: 136 MMOL/L — SIGNIFICANT CHANGE UP (ref 135–145)
TROPONIN T SERPL-MCNC: 0.05 NG/ML — CRITICAL HIGH (ref 0–0.01)
WBC # BLD: 7.42 K/UL — SIGNIFICANT CHANGE UP (ref 3.8–10.5)
WBC # BLD: 8.3 K/UL — SIGNIFICANT CHANGE UP (ref 3.8–10.5)
WBC # FLD AUTO: 7.42 K/UL — SIGNIFICANT CHANGE UP (ref 3.8–10.5)
WBC # FLD AUTO: 8.3 K/UL — SIGNIFICANT CHANGE UP (ref 3.8–10.5)

## 2021-10-09 PROCEDURE — 99231 SBSQ HOSP IP/OBS SF/LOW 25: CPT

## 2021-10-09 PROCEDURE — 71045 X-RAY EXAM CHEST 1 VIEW: CPT | Mod: 26,76

## 2021-10-09 RX ORDER — POTASSIUM CHLORIDE 20 MEQ
40 PACKET (EA) ORAL ONCE
Refills: 0 | Status: COMPLETED | OUTPATIENT
Start: 2021-10-09 | End: 2021-10-09

## 2021-10-09 RX ORDER — MAGNESIUM SULFATE 500 MG/ML
2 VIAL (ML) INJECTION ONCE
Refills: 0 | Status: COMPLETED | OUTPATIENT
Start: 2021-10-09 | End: 2021-10-09

## 2021-10-09 RX ORDER — AMIODARONE HYDROCHLORIDE 400 MG/1
1 TABLET ORAL
Qty: 900 | Refills: 0 | Status: DISCONTINUED | OUTPATIENT
Start: 2021-10-09 | End: 2021-10-11

## 2021-10-09 RX ORDER — DIGOXIN 250 MCG
250 TABLET ORAL DAILY
Refills: 0 | Status: DISCONTINUED | OUTPATIENT
Start: 2021-10-10 | End: 2021-10-11

## 2021-10-09 RX ORDER — AMIODARONE HYDROCHLORIDE 400 MG/1
TABLET ORAL
Refills: 0 | Status: DISCONTINUED | OUTPATIENT
Start: 2021-10-09 | End: 2021-10-11

## 2021-10-09 RX ORDER — METOPROLOL TARTRATE 50 MG
5 TABLET ORAL ONCE
Refills: 0 | Status: COMPLETED | OUTPATIENT
Start: 2021-10-09 | End: 2021-10-09

## 2021-10-09 RX ORDER — AMIODARONE HYDROCHLORIDE 400 MG/1
400 TABLET ORAL EVERY 8 HOURS
Refills: 0 | Status: DISCONTINUED | OUTPATIENT
Start: 2021-10-09 | End: 2021-10-11

## 2021-10-09 RX ORDER — AMIODARONE HYDROCHLORIDE 400 MG/1
200 TABLET ORAL DAILY
Refills: 0 | Status: CANCELLED | OUTPATIENT
Start: 2021-10-13 | End: 2021-10-11

## 2021-10-09 RX ADMIN — Medication 20 MILLIEQUIVALENT(S): at 11:46

## 2021-10-09 RX ADMIN — Medication 250 MICROGRAM(S): at 07:02

## 2021-10-09 RX ADMIN — Medication 2: at 07:01

## 2021-10-09 RX ADMIN — PANTOPRAZOLE SODIUM 40 MILLIGRAM(S): 20 TABLET, DELAYED RELEASE ORAL at 06:16

## 2021-10-09 RX ADMIN — Medication 5 MILLIGRAM(S): at 11:46

## 2021-10-09 RX ADMIN — Medication 50 GRAM(S): at 10:15

## 2021-10-09 RX ADMIN — PIPERACILLIN AND TAZOBACTAM 200 GRAM(S): 4; .5 INJECTION, POWDER, LYOPHILIZED, FOR SOLUTION INTRAVENOUS at 11:45

## 2021-10-09 RX ADMIN — SODIUM CHLORIDE 3 MILLILITER(S): 9 INJECTION INTRAMUSCULAR; INTRAVENOUS; SUBCUTANEOUS at 06:17

## 2021-10-09 RX ADMIN — Medication 250 MICROGRAM(S): at 12:00

## 2021-10-09 RX ADMIN — Medication 40 MILLIEQUIVALENT(S): at 12:42

## 2021-10-09 RX ADMIN — AMIODARONE HYDROCHLORIDE 400 MILLIGRAM(S): 400 TABLET ORAL at 22:11

## 2021-10-09 RX ADMIN — CHLORHEXIDINE GLUCONATE 1 APPLICATION(S): 213 SOLUTION TOPICAL at 06:14

## 2021-10-09 RX ADMIN — Medication 25 MILLIGRAM(S): at 12:36

## 2021-10-09 RX ADMIN — LEVALBUTEROL 0.63 MILLIGRAM(S): 1.25 SOLUTION, CONCENTRATE RESPIRATORY (INHALATION) at 17:25

## 2021-10-09 RX ADMIN — Medication 25 MILLIGRAM(S): at 17:26

## 2021-10-09 RX ADMIN — HEPARIN SODIUM 8 UNIT(S)/HR: 5000 INJECTION INTRAVENOUS; SUBCUTANEOUS at 08:34

## 2021-10-09 RX ADMIN — SODIUM CHLORIDE 10 MILLILITER(S): 9 INJECTION INTRAMUSCULAR; INTRAVENOUS; SUBCUTANEOUS at 12:35

## 2021-10-09 RX ADMIN — AMIODARONE HYDROCHLORIDE 400 MILLIGRAM(S): 400 TABLET ORAL at 14:10

## 2021-10-09 RX ADMIN — ATORVASTATIN CALCIUM 20 MILLIGRAM(S): 80 TABLET, FILM COATED ORAL at 22:11

## 2021-10-09 RX ADMIN — Medication 81 MILLIGRAM(S): at 11:45

## 2021-10-09 RX ADMIN — PIPERACILLIN AND TAZOBACTAM 200 GRAM(S): 4; .5 INJECTION, POWDER, LYOPHILIZED, FOR SOLUTION INTRAVENOUS at 17:26

## 2021-10-09 RX ADMIN — Medication 4: at 17:02

## 2021-10-09 RX ADMIN — Medication 25 MILLIGRAM(S): at 06:13

## 2021-10-09 RX ADMIN — Medication 40 MILLIGRAM(S): at 06:16

## 2021-10-09 RX ADMIN — LEVALBUTEROL 0.63 MILLIGRAM(S): 1.25 SOLUTION, CONCENTRATE RESPIRATORY (INHALATION) at 12:35

## 2021-10-09 RX ADMIN — LEVALBUTEROL 0.63 MILLIGRAM(S): 1.25 SOLUTION, CONCENTRATE RESPIRATORY (INHALATION) at 06:12

## 2021-10-09 RX ADMIN — AMIODARONE HYDROCHLORIDE 33.3 MG/MIN: 400 TABLET ORAL at 13:06

## 2021-10-09 RX ADMIN — SODIUM CHLORIDE 3 MILLILITER(S): 9 INJECTION INTRAMUSCULAR; INTRAVENOUS; SUBCUTANEOUS at 22:20

## 2021-10-09 RX ADMIN — PIPERACILLIN AND TAZOBACTAM 200 GRAM(S): 4; .5 INJECTION, POWDER, LYOPHILIZED, FOR SOLUTION INTRAVENOUS at 06:14

## 2021-10-09 RX ADMIN — SODIUM CHLORIDE 3 MILLILITER(S): 9 INJECTION INTRAMUSCULAR; INTRAVENOUS; SUBCUTANEOUS at 14:05

## 2021-10-09 NOTE — PROGRESS NOTE ADULT - ASSESSMENT
71 y.o Yakut/English speaking male, former smoker, with PMHx severe mitral valve insufficiency who underwent MVR with Dr. Jarquin on 9/20/21, atrial fibrillation, HFrEF (30%), CAD (s/p PCI 2018), HTN, HLD, DM, tonsil cancer s/p surgical intervention and radiation (2019), who presented to [facility] 10/1 with acute SOB. Previous hospital course s/p MVR was complicated by right hemothorax requiring thoracentesis (dry tap) and post op AF. At [facility] ED he was noted to be hypotensive with SBP in the 90s. TTE revealed large pericardial effusion with signs of tamponade. Dr. Pena took the patient to the OR on 10/2 at [facility] for a subxiphoid pericardial window, draining 500 cc of fluid with relief od tamponade. OR was uncomplicated. On 10/3 he was transferred to [facility]ICU intubated and sedated under the care of Dr. Jarquin for further care. On 10/4/21 he underwent an uncomplicated R VATS decortication, evacuation of hemothorax. Post operatively he arrived back to the CTICU intubated with 3 chest tubes in place. He was noted to be hypoxic on arrival but FiO2 weaned to 80% by the end of the day. Intermittent AF with RVR, amiodarone gtt started. POD1 he underwent bedside bronchoscopy which revealed purulent sputum and antibiotics were started. POD2 he was extubated after repeat bronchoscopy. Pericardial drain removed. POD3 patient with continued AF with intermittent RVR. POD4 patient was transferred to [facility] still in AF, rate controlled with intermittent RVR. POD5 Amiodarone Bolus x1, amio gtt, PO load started for persistent RVR with aberrancy.     Neurovascular:   - No delirium. Pain well controlled with current regimen.  -Continue oxy IR PRN    Cardiovascular:   - S/p MVR on 9/1, readmitted with pericardial effusion and RTOR for R VATS, RA decortication for hemothorax  -Hemodynamically stable. HR (). Continued AF with intermittent periods of RVR. EPS following, continue to appreciate recs. Continue metoprolol 25 mg q6 hours       - multiple periods of RVR vs Vtach (16 beats max) Amiodarone bolus x1, amio drip started and PO load started. Patient now rate controlled in the 60s.            - Cardizem discontinued given low EF per EPS recs        - Continue heparin gtt (Supra-therapeutic this AM and this afternoon due to heparin being held for CT removal)   - CAD s/p PCI: continue ASA, atorvastatin 20 mg daily   - Hx of HTN, BP controlled       Respiratory:   - POD 5 s/p R VATS, RA decortication, evacuation of hemothorax   - 1 CT removed 10/8 , 1 CT removed today 10/9     -CXRay stable post CT removal  - 02 Sat = 98% on 6L NC  -If on oxygen wean to RA from for O2 Sat > 93%.  -Encourage C+DB and Use of IS 10x / hr while awake.  -CXR with stable right sided opacity   - Continue xopenex inhalers     GI:   -Continue GI PPX with protonix  -PO Diet.  - Bowel reg: continue miralax, dulcolax, senna, mag citrate as needed     Renal / :  - BUN/Cr stable   -Continue to monitor renal function.  -Monitor I/O's.  - Replete electrolytes PRN  - Continue lasix 40 mg with K+ 20 mg     Endocrine:    -A1c 5.9, known hx DM, continue MISS  -TSH 0.743, no known hx thyroid disease     Hematologic:  -H/H stable aand lateral with no obvious signs of bleeding  -Coagulation Panel.  - Heparin gtt for AF (Supra-therapeutic this AM and this afternoon due to heparin being held for CT removal)     ID:  -Pt remains afebrile with no elevation in WBC or signs of infection  - Continue zosyn 3.375 q6 hours for purulent sputum      - Pending Sputum culture  -Continue to monitor CBC  -Observe for SIRS/Sepsis Syndrome.    Prophylaxis:  -DVT prophylaxis with heparin gtt  -SCD's    Disposition:  -Home when medically appropriate.   71 y.o Croatian/English speaking male, former smoker, with PMHx severe mitral valve insufficiency who underwent MVR with Dr. Jarquin on 9/20/21, atrial fibrillation, HFrEF (30%), CAD (s/p PCI 2018), HTN, HLD, DM, tonsil cancer s/p surgical intervention and radiation (2019), who presented to [facility] 10/1 with acute SOB. Previous hospital course s/p MVR was complicated by right hemothorax requiring thoracentesis (dry tap) and post op AF. At [facility] ED he was noted to be hypotensive with SBP in the 90s. TTE revealed large pericardial effusion with signs of tamponade. Dr. Pena took the patient to the OR on 10/2 at [facility] for a subxiphoid pericardial window, draining 500 cc of fluid with relief od tamponade. OR was uncomplicated. On 10/3 he was transferred to [facility]ICU intubated and sedated under the care of Dr. Jarquin for further care. On 10/4/21 he underwent an uncomplicated R VATS decortication, evacuation of hemothorax. Post operatively he arrived back to the CTICU intubated with 3 chest tubes in place. He was noted to be hypoxic on arrival but FiO2 weaned to 80% by the end of the day. Intermittent AF with RVR, amiodarone gtt started. POD1 he underwent bedside bronchoscopy which revealed purulent sputum and antibiotics were started. POD2 he was extubated after repeat bronchoscopy. Pericardial drain removed. POD3 patient with continued AF with intermittent RVR. POD4 patient was transferred to [facility] still in AF, rate controlled with intermittent RVR. POD5 Amiodarone Bolus x1, amio gtt, PO load started for persistent RVR with aberrancy.     Neurovascular:   - No delirium. Pain well controlled with current regimen.  -Continue oxy IR PRN    Cardiovascular:   - S/p MVR on 9/1, readmitted with pericardial effusion and RTOR for R VATS, RA decortication for hemothorax  -Hemodynamically stable. HR (). Continued AF with intermittent periods of RVR. EPS following, continue to appreciate recs. Continue metoprolol 25 mg q6 hours       - multiple periods of RVR vs Vtach (16 beats max) Amiodarone bolus x1, amio drip started and PO load started. Patient now rate controlled in the 60s.            - Dig loaded 1.5mg, Dig 250mcg to start tomorrow AFTER dig level       - Cardizem discontinued given low EF per EPS recs        - Continue heparin gtt (Supra-therapeutic this AM and this afternoon due to heparin being held for CT removal)   - CAD s/p PCI: continue ASA, atorvastatin 20 mg daily   - Hx of HTN, BP controlled       Respiratory:   - POD 5 s/p R VATS, RA decortication, evacuation of hemothorax   - 1 CT removed 10/8 , 1 CT removed today 10/9     -CXRay stable post CT removal  - 02 Sat = 98% on 6L NC  -If on oxygen wean to RA from for O2 Sat > 93%.  -Encourage C+DB and Use of IS 10x / hr while awake.  -CXR with stable right sided opacity   - Continue xopenex inhalers     GI:   -Continue GI PPX with protonix  -PO Diet.  - Bowel reg: continue miralax, dulcolax, senna, mag citrate as needed     Renal / :  - BUN/Cr stable   -Continue to monitor renal function.  -Monitor I/O's.  - Replete electrolytes PRN  - Continue lasix 40 mg with K+ 20 mg     Endocrine:    -A1c 5.9, known hx DM, continue MISS  -TSH 0.743, no known hx thyroid disease     Hematologic:  -H/H stable aand lateral with no obvious signs of bleeding  -Coagulation Panel.  - Heparin gtt for AF (Supra-therapeutic this AM and this afternoon due to heparin being held for CT removal)     ID:  -Pt remains afebrile with no elevation in WBC or signs of infection  - Continue zosyn 3.375 q6 hours for purulent sputum      - Pending Sputum culture  -Continue to monitor CBC  -Observe for SIRS/Sepsis Syndrome.    Prophylaxis:  -DVT prophylaxis with heparin gtt  -SCD's    Disposition:  -Home when medically appropriate.

## 2021-10-09 NOTE — CHART NOTE - NSCHARTNOTEFT_GEN_A_CORE
All 3 chest tubes placed to waterseal this am, no pneumo seen on CXR.  Anterior CT removed per Dr. Loving.  Patient tolerated well.   No tie down.  Occlusive dressing applied.
CT Removal:    Case discussed with Dr. Loving this AM. Patient seen and examined at bedside, minimal output from CTs with no air leak appreciated.  CT removed with two providers without incident per physician request.  No tie downs in placed, discussed with JESUSITA Souza no tie downs necessary.  Occlusive DSD placed.  Chest x-ray obtain with no obvious pneumothorax noted.  Patient tolerated procedure well.
Pericardial drain (from [facility] - Dr. Pena, 10/2/21) removed today at bedside.  Patient tolerated well.  No CXR needed.  F/U AM CXR.  Pleural tubes from decortication remain in place.
CT Removal:    Pt seen and examined at bedside.  Case discussed with Dr. Jarquin and Dr. Loving.  Minimal output from CTs.  No air leak appreciated.  CT removed without incident per Dr. Jarquin/ Dr. Loving request.  Occlusive DSD placed.  CXR no obvious PTX noted.  Pt tolerated procedure well.

## 2021-10-09 NOTE — PROGRESS NOTE ADULT - SUBJECTIVE AND OBJECTIVE BOX
Patient discussed on morning rounds with Dr. Null and Dr. Loving    Operation / Date: 10/4/21 R VATS RA decortication, evacuation of hemothorax  9/1/21 MV repair    SUBJECTIVE ASSESSMENT:  71y Male seen and examined this morning, patient out of bed to chair with wife bedside. Patient complaining of diarrhea, denies abdominal pain nausea. No acute events overnight, patient remained in afib.     Vital Signs Last 24 Hrs  T(C): 36.4 (09 Oct 2021 13:44), Max: 36.7 (09 Oct 2021 10:13)  T(F): 97.6 (09 Oct 2021 13:44), Max: 98 (09 Oct 2021 10:13)  HR: 84 (09 Oct 2021 13:45) (68 - 136)  BP: 142/69 (09 Oct 2021 13:45) (100/77 - 148/68)  BP(mean): 90 (09 Oct 2021 09:45) (85 - 99)  RR: 18 (09 Oct 2021 14:00) (16 - 18)  SpO2: 100% (09 Oct 2021 14:00) (92% - 100%)  I&O's Detail    08 Oct 2021 07:01  -  09 Oct 2021 07:00  --------------------------------------------------------  IN:    Heparin: 91 mL    IV PiggyBack: 101 mL    IV PiggyBack: 200 mL    sodium chloride 0.9%: 30 mL  Total IN: 422 mL    OUT:    Chest Tube (mL): 5 mL    Chest Tube (mL): 60 mL    Indwelling Catheter - Urethral (mL): 425 mL    Voided (mL): 1000 mL  Total OUT: 1490 mL    Total NET: -1068 mL      09 Oct 2021 07:01  -  09 Oct 2021 16:22  --------------------------------------------------------  IN:    Heparin: 8 mL    Oral Fluid: 280 mL  Total IN: 288 mL    OUT:    Voided (mL): 450 mL  Total OUT: 450 mL    Total NET: -162 mL        CHEST TUBE:  Yes   ZACARIAS DRAIN: No.  EPICARDIAL WIRES: No.  TIE DOWNS: No.  WARNER: No.    PHYSICAL EXAM:  Neuro: A+O x 3, non-focal, speech clear and intact  HEENT: PERRL, EOMI, oral mucosa pink and moist  Neck: supple, no JVD  CV: regular rate, regular rhythm, +S1S2, no murmurs or rub  Pulm/chest: lung sounds CTA and equal bilaterally, no accessory muscle use noted. Chest tube site with c/d/i dressing, CT to waterseal.   Abd: soft, NT, ND, +BS  Ext: VANG x 4, no C/C/E  Skin: warm, well perfused, no rashes     LABS:                        10.7   7.42  )-----------( 302      ( 09 Oct 2021 10:36 )             34.2       PT/INR - ( 09 Oct 2021 07:43 )   PT: 13.8 sec;   INR: 1.16          PTT - ( 09 Oct 2021 07:43 )  PTT:25.3 sec    10-09    136  |  101  |  15  ----------------------------<  122<H>  3.8   |  25  |  0.87    Ca    8.8      09 Oct 2021 10:35  Phos  2.5     10-09  Mg     2.0     10-09    TPro  6.4  /  Alb  3.6  /  TBili  1.0  /  DBili  x   /  AST  33  /  ALT  47<H>  /  AlkPhos  125<H>  10-09          MEDICATIONS  (STANDING):  aMIOdarone    Tablet   Oral   aMIOdarone    Tablet 400 milliGRAM(s) Oral every 8 hours  aMIOdarone Infusion 1 mG/Min (33.3 mL/Hr) IV Continuous <Continuous>  aspirin enteric coated 81 milliGRAM(s) Oral daily  atorvastatin 20 milliGRAM(s) Oral at bedtime  chlorhexidine 2% Cloths 1 Application(s) Topical <User Schedule>  dextrose 40% Gel 15 Gram(s) Oral once  dextrose 5%. 1000 milliLiter(s) (50 mL/Hr) IV Continuous <Continuous>  dextrose 5%. 1000 milliLiter(s) (100 mL/Hr) IV Continuous <Continuous>  dextrose 50% Injectable 25 Gram(s) IV Push once  dextrose 50% Injectable 12.5 Gram(s) IV Push once  dextrose 50% Injectable 25 Gram(s) IV Push once  furosemide    Tablet 40 milliGRAM(s) Oral daily  glucagon  Injectable 1 milliGRAM(s) IntraMuscular once  heparin  Infusion 700 Unit(s)/Hr (8 mL/Hr) IV Continuous <Continuous>  insulin lispro (ADMELOG) corrective regimen sliding scale   SubCutaneous every 6 hours  levalbuterol Inhalation 0.63 milliGRAM(s) Inhalation every 6 hours  metoprolol tartrate 25 milliGRAM(s) Oral every 6 hours  pantoprazole    Tablet 40 milliGRAM(s) Oral before breakfast  piperacillin/tazobactam IVPB.. 3.375 Gram(s) IV Intermittent every 6 hours  potassium chloride    Tablet ER 20 milliEquivalent(s) Oral daily  sodium chloride 0.9% lock flush 3 milliLiter(s) IV Push every 8 hours  sodium chloride 0.9%. 1000 milliLiter(s) (10 mL/Hr) IV Continuous <Continuous>    MEDICATIONS  (PRN):  oxyCODONE    IR 5 milliGRAM(s) Oral every 4 hours PRN Moderate Pain (4 - 6)        RADIOLOGY & ADDITIONAL TESTS:    cx< from: Xray Chest 1 View-PORTABLE IMMEDIATE (Xray Chest 1 View-PORTABLE IMMEDIATE .) (10.08.21 @ 13:30) >  FINDINGS:    Single frontal view of the chest demonstrates status post right upper lobe chest tube removal. Right lower lobe chest tube persists. Moderate right lower lobe airspace disease/effusion, unchanged. Right axillary midline catheter. No large pneumothorax. The cardiomediastinal silhouette is enlarged. No acute osseous abnormalities. Overlying EKG leads and wires are noted    IMPRESSION: Status post right upper lobe chest tube removal.    --- End of Report ---    < end of copied text >

## 2021-10-09 NOTE — PROGRESS NOTE ADULT - SUBJECTIVE AND OBJECTIVE BOX
EPS Progress Note    S: OOB in a chair, NAD, feels better     MEDICATIONS  (STANDING):  aMIOdarone IVPB 150 milliGRAM(s) IV Intermittent once  aspirin enteric coated 81 milliGRAM(s) Oral daily  atorvastatin 20 milliGRAM(s) Oral at bedtime  bisacodyl Suppository 10 milliGRAM(s) Rectal once  chlorhexidine 2% Cloths 1 Application(s) Topical <User Schedule>  dextrose 40% Gel 15 Gram(s) Oral once  dextrose 5%. 1000 milliLiter(s) (50 mL/Hr) IV Continuous <Continuous>  dextrose 5%. 1000 milliLiter(s) (100 mL/Hr) IV Continuous <Continuous>  dextrose 50% Injectable 25 Gram(s) IV Push once  dextrose 50% Injectable 12.5 Gram(s) IV Push once  dextrose 50% Injectable 25 Gram(s) IV Push once  diltiazem    Tablet 60 milliGRAM(s) Oral every 6 hours  furosemide    Tablet 40 milliGRAM(s) Oral daily  glucagon  Injectable 1 milliGRAM(s) IntraMuscular once  heparin  Infusion 700 Unit(s)/Hr (7 mL/Hr) IV Continuous <Continuous>  insulin lispro (ADMELOG) corrective regimen sliding scale   SubCutaneous every 6 hours  levalbuterol Inhalation 0.63 milliGRAM(s) Inhalation every 6 hours  magnesium citrate Oral Solution 1 Bottle Oral once  metoprolol tartrate 25 milliGRAM(s) Oral every 6 hours  pantoprazole    Tablet 40 milliGRAM(s) Oral before breakfast  piperacillin/tazobactam IVPB.. 3.375 Gram(s) IV Intermittent every 6 hours  polyethylene glycol 3350 17 Gram(s) Oral daily  potassium chloride    Tablet ER 20 milliEquivalent(s) Oral daily  senna 2 Tablet(s) Oral at bedtime  sodium chloride 0.9% lock flush 3 milliLiter(s) IV Push every 8 hours  sodium chloride 0.9%. 1000 milliLiter(s) (10 mL/Hr) IV Continuous <Continuous>      Telemetry: atrial flutter           General:  NAD        HEENT:  PERRL, EOMI	  Neck: Supple, - JVD;   Cardiovascular:  S1 S2, No JVD  Respiratory: CTA B/L       Gastrointestinal:  Soft, Non-tender, + BS	  Skin: No rashes, No ecchymoses, No cyanosis  Extremities: No edema  Psychiatry: A & O x 3    < from: TTE Echo Complete w/o Contrast w/ Doppler (10.07.21 @ 13:00) >  1. Limited study obtained for evaluation of pericardial effusion.   2. Severely reduced left ventricular systolic function.   3. Regional wall motion abnormalities consistent with ischemic heart disease.   4. No pericardial effusion.           Labs:                                                               9.3    7.58  )-----------( 287      ( 08 Oct 2021 04:59 )             29.2     10-08    139  |  105  |  11  ----------------------------<  117<H>  4.1   |  26  |  0.78    Ca    9.0      08 Oct 2021 04:59  Phos  2.4     10-08  Mg     2.0     10-08    TPro  6.0  /  Alb  3.4  /  TBili  1.1  /  DBili  x   /  AST  38  /  ALT  57<H>  /  AlkPhos  124<H>  10-08    PT/INR - ( 08 Oct 2021 04:59 )   PT: 14.1 sec;   INR: 1.18          PTT - ( 08 Oct 2021 04:59 )  PTT:39.0 sec    Assessment/Plan:  70 yo Lao/English speaking male, with history of severe mitral valve insufficiency (recent MV repair 9/20/21), atrial fibrillation, HFrEF (30%), CAD (s/p PCI 2018), HTN, HLD, DM, tonsil cancer s/p surgery + radiation in 2019 who presented to [facility] with acute SOB.  Patient was recently discharged from [facility] after  MVR complicated by right hemothorax requiring thoracocentesis (dry tap) and post op afib, he was started on amiodarone  and BB, and  discharged home on 9/25/21. Patient had pericardial window with placement of pericardial drain on 102/21, transferred to [facility] and had VATS on 10/4/21.  Patient clinically improved,  HR is better controlled, but still with episodes of breakthrough RVR, restarted on heparin, would discontinue diltiazem secondary to decreased LF function. May consider restart Amiodarone po and IV drip.

## 2021-10-09 NOTE — CHART NOTE - NSCHARTNOTESELECT_GEN_ALL_CORE
CT Removal/Event Note
CT Removal/Event Note
addendum/Event Note
pericardial drain removed/Event Note

## 2021-10-10 LAB
ANION GAP SERPL CALC-SCNC: 9 MMOL/L — SIGNIFICANT CHANGE UP (ref 5–17)
APTT BLD: 34.6 SEC — SIGNIFICANT CHANGE UP (ref 27.5–35.5)
BUN SERPL-MCNC: 13 MG/DL — SIGNIFICANT CHANGE UP (ref 7–23)
CALCIUM SERPL-MCNC: 8.4 MG/DL — SIGNIFICANT CHANGE UP (ref 8.4–10.5)
CHLORIDE SERPL-SCNC: 104 MMOL/L — SIGNIFICANT CHANGE UP (ref 96–108)
CO2 SERPL-SCNC: 26 MMOL/L — SIGNIFICANT CHANGE UP (ref 22–31)
CREAT SERPL-MCNC: 0.95 MG/DL — SIGNIFICANT CHANGE UP (ref 0.5–1.3)
DIGOXIN SERPL-MCNC: 1.3 NG/ML — SIGNIFICANT CHANGE UP (ref 0.8–2)
GLUCOSE BLDC GLUCOMTR-MCNC: 102 MG/DL — HIGH (ref 70–99)
GLUCOSE BLDC GLUCOMTR-MCNC: 175 MG/DL — HIGH (ref 70–99)
GLUCOSE SERPL-MCNC: 101 MG/DL — HIGH (ref 70–99)
HCT VFR BLD CALC: 30.9 % — LOW (ref 39–50)
HGB BLD-MCNC: 9.9 G/DL — LOW (ref 13–17)
MAGNESIUM SERPL-MCNC: 2.1 MG/DL — SIGNIFICANT CHANGE UP (ref 1.6–2.6)
MCHC RBC-ENTMCNC: 28.8 PG — SIGNIFICANT CHANGE UP (ref 27–34)
MCHC RBC-ENTMCNC: 32 GM/DL — SIGNIFICANT CHANGE UP (ref 32–36)
MCV RBC AUTO: 89.8 FL — SIGNIFICANT CHANGE UP (ref 80–100)
NRBC # BLD: 0 /100 WBCS — SIGNIFICANT CHANGE UP (ref 0–0)
PLATELET # BLD AUTO: 287 K/UL — SIGNIFICANT CHANGE UP (ref 150–400)
POTASSIUM SERPL-MCNC: 3.9 MMOL/L — SIGNIFICANT CHANGE UP (ref 3.5–5.3)
POTASSIUM SERPL-SCNC: 3.9 MMOL/L — SIGNIFICANT CHANGE UP (ref 3.5–5.3)
RBC # BLD: 3.44 M/UL — LOW (ref 4.2–5.8)
RBC # FLD: 13.7 % — SIGNIFICANT CHANGE UP (ref 10.3–14.5)
SODIUM SERPL-SCNC: 139 MMOL/L — SIGNIFICANT CHANGE UP (ref 135–145)
WBC # BLD: 6.03 K/UL — SIGNIFICANT CHANGE UP (ref 3.8–10.5)
WBC # FLD AUTO: 6.03 K/UL — SIGNIFICANT CHANGE UP (ref 3.8–10.5)

## 2021-10-10 PROCEDURE — 71045 X-RAY EXAM CHEST 1 VIEW: CPT | Mod: 26

## 2021-10-10 RX ORDER — POTASSIUM CHLORIDE 20 MEQ
20 PACKET (EA) ORAL ONCE
Refills: 0 | Status: COMPLETED | OUTPATIENT
Start: 2021-10-10 | End: 2021-10-10

## 2021-10-10 RX ORDER — APIXABAN 2.5 MG/1
5 TABLET, FILM COATED ORAL EVERY 12 HOURS
Refills: 0 | Status: DISCONTINUED | OUTPATIENT
Start: 2021-10-10 | End: 2021-10-11

## 2021-10-10 RX ADMIN — AMIODARONE HYDROCHLORIDE 400 MILLIGRAM(S): 400 TABLET ORAL at 15:55

## 2021-10-10 RX ADMIN — PIPERACILLIN AND TAZOBACTAM 200 GRAM(S): 4; .5 INJECTION, POWDER, LYOPHILIZED, FOR SOLUTION INTRAVENOUS at 23:14

## 2021-10-10 RX ADMIN — LEVALBUTEROL 0.63 MILLIGRAM(S): 1.25 SOLUTION, CONCENTRATE RESPIRATORY (INHALATION) at 01:58

## 2021-10-10 RX ADMIN — Medication 25 MILLIGRAM(S): at 13:16

## 2021-10-10 RX ADMIN — Medication 20 MILLIEQUIVALENT(S): at 13:16

## 2021-10-10 RX ADMIN — PANTOPRAZOLE SODIUM 40 MILLIGRAM(S): 20 TABLET, DELAYED RELEASE ORAL at 06:12

## 2021-10-10 RX ADMIN — LEVALBUTEROL 0.63 MILLIGRAM(S): 1.25 SOLUTION, CONCENTRATE RESPIRATORY (INHALATION) at 17:24

## 2021-10-10 RX ADMIN — Medication 25 MILLIGRAM(S): at 00:12

## 2021-10-10 RX ADMIN — APIXABAN 5 MILLIGRAM(S): 2.5 TABLET, FILM COATED ORAL at 17:24

## 2021-10-10 RX ADMIN — HEPARIN SODIUM 8 UNIT(S)/HR: 5000 INJECTION INTRAVENOUS; SUBCUTANEOUS at 02:45

## 2021-10-10 RX ADMIN — SODIUM CHLORIDE 3 MILLILITER(S): 9 INJECTION INTRAMUSCULAR; INTRAVENOUS; SUBCUTANEOUS at 13:41

## 2021-10-10 RX ADMIN — PIPERACILLIN AND TAZOBACTAM 200 GRAM(S): 4; .5 INJECTION, POWDER, LYOPHILIZED, FOR SOLUTION INTRAVENOUS at 13:16

## 2021-10-10 RX ADMIN — SODIUM CHLORIDE 3 MILLILITER(S): 9 INJECTION INTRAMUSCULAR; INTRAVENOUS; SUBCUTANEOUS at 23:51

## 2021-10-10 RX ADMIN — PIPERACILLIN AND TAZOBACTAM 200 GRAM(S): 4; .5 INJECTION, POWDER, LYOPHILIZED, FOR SOLUTION INTRAVENOUS at 06:11

## 2021-10-10 RX ADMIN — AMIODARONE HYDROCHLORIDE 400 MILLIGRAM(S): 400 TABLET ORAL at 22:19

## 2021-10-10 RX ADMIN — CHLORHEXIDINE GLUCONATE 1 APPLICATION(S): 213 SOLUTION TOPICAL at 06:11

## 2021-10-10 RX ADMIN — Medication 25 MILLIGRAM(S): at 23:14

## 2021-10-10 RX ADMIN — PIPERACILLIN AND TAZOBACTAM 200 GRAM(S): 4; .5 INJECTION, POWDER, LYOPHILIZED, FOR SOLUTION INTRAVENOUS at 00:12

## 2021-10-10 RX ADMIN — LEVALBUTEROL 0.63 MILLIGRAM(S): 1.25 SOLUTION, CONCENTRATE RESPIRATORY (INHALATION) at 13:16

## 2021-10-10 RX ADMIN — LEVALBUTEROL 0.63 MILLIGRAM(S): 1.25 SOLUTION, CONCENTRATE RESPIRATORY (INHALATION) at 06:11

## 2021-10-10 RX ADMIN — Medication 40 MILLIGRAM(S): at 06:12

## 2021-10-10 RX ADMIN — SODIUM CHLORIDE 3 MILLILITER(S): 9 INJECTION INTRAMUSCULAR; INTRAVENOUS; SUBCUTANEOUS at 06:00

## 2021-10-10 RX ADMIN — AMIODARONE HYDROCHLORIDE 400 MILLIGRAM(S): 400 TABLET ORAL at 06:12

## 2021-10-10 RX ADMIN — Medication 81 MILLIGRAM(S): at 13:16

## 2021-10-10 RX ADMIN — Medication 250 MICROGRAM(S): at 08:03

## 2021-10-10 RX ADMIN — ATORVASTATIN CALCIUM 20 MILLIGRAM(S): 80 TABLET, FILM COATED ORAL at 22:19

## 2021-10-10 RX ADMIN — SODIUM CHLORIDE 10 MILLILITER(S): 9 INJECTION INTRAMUSCULAR; INTRAVENOUS; SUBCUTANEOUS at 18:20

## 2021-10-10 RX ADMIN — Medication 25 MILLIGRAM(S): at 17:24

## 2021-10-10 RX ADMIN — Medication 25 MILLIGRAM(S): at 06:11

## 2021-10-10 RX ADMIN — PIPERACILLIN AND TAZOBACTAM 200 GRAM(S): 4; .5 INJECTION, POWDER, LYOPHILIZED, FOR SOLUTION INTRAVENOUS at 17:24

## 2021-10-10 NOTE — PROGRESS NOTE ADULT - ASSESSMENT
71 y.o Indonesian/English speaking male, former smoker, with PMHx severe mitral valve insufficiency who underwent MVR with Dr. Jarquin on 9/20/21, atrial fibrillation, HFrEF (30%), CAD (s/p PCI 2018), HTN, HLD, DM, tonsil cancer s/p surgical intervention and radiation (2019), who presented to [facility] 10/1 with acute SOB. Previous hospital course s/p MVR was complicated by right hemothorax requiring thoracentesis (dry tap) and post op AF. At [facility] ED he was noted to be hypotensive with SBP in the 90s. TTE revealed large pericardial effusion with signs of tamponade. Dr. Pena took the patient to the OR on 10/2 at [facility] for a subxiphoid pericardial window, draining 500 cc of fluid with relief od tamponade. OR was uncomplicated. On 10/3 he was transferred to [facility]ICU intubated and sedated under the care of Dr. Jarquin for further care. On 10/4/21 he underwent an uncomplicated R VATS decortication, evacuation of hemothorax. Post operatively he arrived back to the CTICU intubated with 3 chest tubes in place. He was noted to be hypoxic on arrival but FiO2 weaned to 80% by the end of the day. Intermittent AF with RVR, amiodarone gtt started. POD1 he underwent bedside bronchoscopy which revealed purulent sputum and antibiotics were started. POD2 he was extubated after repeat bronchoscopy. Pericardial drain removed. POD3 patient with continued AF with intermittent RVR. POD4 patient was transferred to [facility] still in AF, rate controlled with intermittent RVR. POD5 Amiodarone Bolus x1, amio gtt, PO load started for persistent RVR with aberrancy. POD6 patient remains stable, better rate controlled today.     Neurovascular:   - No delirium. Pain well controlled with current regimen.  -Continue oxy IR PRN    Cardiovascular:   - S/p MVR on 9/1, readmitted with pericardial effusion and RTOR for R VATS, RA decortication for hemothorax  -Hemodynamically stable. Remains in Afib HR (), more rate controlled today       - Continue Dig 250mcg, Amiodarone loading dose       - Cardizem discontinued given low EF per EPS recs        - Heparin transitioned to Eliquis BID for AC  - CAD s/p PCI: continue ASA, atorvastatin 20 mg daily   - Hx of HTN, BP controlled       Respiratory:   - POD 5 s/p R VATS, RA decortication, evacuation of hemothorax   - 1 CT removed 10/8 , 1 CT removed 10/9     -CXRay stable post CT removal  - 02 Sat = 96% on RA, patient noted to have periods of desaturating during sleep to 88% without obvious ISAAC  -If on oxygen wean to RA from for O2 Sat > 93%.  -Encourage C+DB and Use of IS 10x / hr while awake.  -CXR with stable right sided opacity   - Continue xopenex inhalers     GI:   -Continue GI PPX with protonix  -PO Diet.  - Bowel reg: continue miralax, dulcolax, senna, mag citrate as needed     Renal / :  - BUN/Cr stable   -Continue to monitor renal function.  -Monitor I/O's.  - Replete electrolytes PRN  - Continue lasix 40 mg with K+ 20 mg     Endocrine:    -A1c 5.9, known hx DM, continue MISS  -TSH 0.743, no known hx thyroid disease     Hematologic:  -H/H stable aand lateral with no obvious signs of bleeding  -Coagulation Panel.  - Heparin gtt for AF (Supra-therapeutic this AM and this afternoon due to heparin being held for CT removal)     ID:  -Pt remains afebrile with no elevation in WBC or signs of infection  - Continue zosyn 3.375 q6 hours for purulent sputum      - Pending Sputum culture  -Continue to monitor CBC  -Observe for SIRS/Sepsis Syndrome.    Prophylaxis:  -DVT prophylaxis with heparin gtt  -SCD's    Disposition:  -Home when medically appropriate.

## 2021-10-10 NOTE — PROGRESS NOTE ADULT - SUBJECTIVE AND OBJECTIVE BOX
Patient discussed on morning rounds with Dr. Null and Dr. Loving    Operation / Date: 10/4/21 R VATS RA decortication, evacuation of hemothorax  9/1/21 MV repair    SUBJECTIVE ASSESSMENT:  71y Male seen and examined this morning, patient out of bed to chair with wife bedside. Patient feels he is doing better today. No acute events overnight, patient remained in afib.         Vital Signs Last 24 Hrs  T(C): 36 (10 Oct 2021 13:30), Max: 36.4 (09 Oct 2021 17:30)  T(F): 96.8 (10 Oct 2021 13:30), Max: 97.6 (09 Oct 2021 17:30)  HR: 94 (10 Oct 2021 13:21) (65 - 94)  BP: 141/81 (10 Oct 2021 13:21) (119/58 - 153/86)  BP(mean): 107 (10 Oct 2021 13:21) (84 - 113)  RR: 16 (10 Oct 2021 13:21) (16 - 20)  SpO2: 92% (10 Oct 2021 13:21) (92% - 100%)  I&O's Detail    09 Oct 2021 07:01  -  10 Oct 2021 07:00  --------------------------------------------------------  IN:    Heparin: 88 mL    IV PiggyBack: 200 mL    Oral Fluid: 400 mL    sodium chloride 0.9%: 100 mL  Total IN: 788 mL    OUT:    Voided (mL): 1825 mL  Total OUT: 1825 mL    Total NET: -1037 mL      10 Oct 2021 07:01  -  10 Oct 2021 14:41  --------------------------------------------------------  IN:    Heparin: 8 mL    IV PiggyBack: 100 mL    Oral Fluid: 300 mL    sodium chloride 0.9%: 60 mL  Total IN: 468 mL    OUT:    Voided (mL): 450 mL  Total OUT: 450 mL    Total NET: 18 mL      CHEST TUBE:  No removed 10/9  ZACARIAS DRAIN: No.  EPICARDIAL WIRES: No.  TIE DOWNS: No.  WARNER: No.    PHYSICAL EXAM:  Neuro: A+O x 3, non-focal, speech clear and intact  HEENT: PERRL, EOMI, oral mucosa pink and moist  Neck: supple, no JVD  CV: regular rate, regular rhythm, +S1S2, no murmurs or rub  Pulm/chest: lung sounds CTA and equal bilaterally, no accessory muscle use noted. Chest tube site with c/d/i dressing.  Abd: soft, NT, ND, +BS  Ext: VANG x 4, no C/C/E  Skin: warm, well perfused, no rashes   Incision: Mini thoracotomy, subxiphoid well healing without erythema.     LABS:                        9.9    6.03  )-----------( 287      ( 10 Oct 2021 05:54 )             30.9       PT/INR - ( 09 Oct 2021 07:43 )   PT: 13.8 sec;   INR: 1.16          PTT - ( 10 Oct 2021 05:54 )  PTT:34.6 sec    10-10    139  |  104  |  13  ----------------------------<  101<H>  3.9   |  26  |  0.95    Ca    8.4      10 Oct 2021 05:54  Phos  2.5     10-09  Mg     2.1     10-10    TPro  6.4  /  Alb  3.6  /  TBili  1.0  /  DBili  x   /  AST  33  /  ALT  47<H>  /  AlkPhos  125<H>  10-09          MEDICATIONS  (STANDING):  aMIOdarone    Tablet 400 milliGRAM(s) Oral every 8 hours  aMIOdarone    Tablet   Oral   aMIOdarone Infusion 1 mG/Min (33.3 mL/Hr) IV Continuous <Continuous>  apixaban 5 milliGRAM(s) Oral every 12 hours  aspirin enteric coated 81 milliGRAM(s) Oral daily  atorvastatin 20 milliGRAM(s) Oral at bedtime  chlorhexidine 2% Cloths 1 Application(s) Topical <User Schedule>  digoxin     Tablet 250 MICROGram(s) Oral daily  furosemide    Tablet 40 milliGRAM(s) Oral daily  levalbuterol Inhalation 0.63 milliGRAM(s) Inhalation every 6 hours  metoprolol tartrate 25 milliGRAM(s) Oral every 6 hours  pantoprazole    Tablet 40 milliGRAM(s) Oral before breakfast  piperacillin/tazobactam IVPB.. 3.375 Gram(s) IV Intermittent every 6 hours  potassium chloride    Tablet ER 20 milliEquivalent(s) Oral daily  sodium chloride 0.9% lock flush 3 milliLiter(s) IV Push every 8 hours  sodium chloride 0.9%. 1000 milliLiter(s) (10 mL/Hr) IV Continuous <Continuous>    MEDICATIONS  (PRN):  oxyCODONE    IR 5 milliGRAM(s) Oral every 4 hours PRN Moderate Pain (4 - 6)

## 2021-10-11 ENCOUNTER — TRANSCRIPTION ENCOUNTER (OUTPATIENT)
Age: 71
End: 2021-10-11

## 2021-10-11 VITALS — TEMPERATURE: 97 F

## 2021-10-11 LAB
ANION GAP SERPL CALC-SCNC: 10 MMOL/L — SIGNIFICANT CHANGE UP (ref 5–17)
APTT BLD: 31 SEC — SIGNIFICANT CHANGE UP (ref 27.5–35.5)
BUN SERPL-MCNC: 10 MG/DL — SIGNIFICANT CHANGE UP (ref 7–23)
CALCIUM SERPL-MCNC: 8.8 MG/DL — SIGNIFICANT CHANGE UP (ref 8.4–10.5)
CHLORIDE SERPL-SCNC: 104 MMOL/L — SIGNIFICANT CHANGE UP (ref 96–108)
CO2 SERPL-SCNC: 26 MMOL/L — SIGNIFICANT CHANGE UP (ref 22–31)
CREAT SERPL-MCNC: 0.92 MG/DL — SIGNIFICANT CHANGE UP (ref 0.5–1.3)
GLUCOSE BLDC GLUCOMTR-MCNC: 134 MG/DL — HIGH (ref 70–99)
GLUCOSE SERPL-MCNC: 120 MG/DL — HIGH (ref 70–99)
HCT VFR BLD CALC: 30.8 % — LOW (ref 39–50)
HGB BLD-MCNC: 9.8 G/DL — LOW (ref 13–17)
INR BLD: 1.26 — HIGH (ref 0.88–1.16)
MAGNESIUM SERPL-MCNC: 2 MG/DL — SIGNIFICANT CHANGE UP (ref 1.6–2.6)
MCHC RBC-ENTMCNC: 28.5 PG — SIGNIFICANT CHANGE UP (ref 27–34)
MCHC RBC-ENTMCNC: 31.8 GM/DL — LOW (ref 32–36)
MCV RBC AUTO: 89.5 FL — SIGNIFICANT CHANGE UP (ref 80–100)
NRBC # BLD: 0 /100 WBCS — SIGNIFICANT CHANGE UP (ref 0–0)
PLATELET # BLD AUTO: 297 K/UL — SIGNIFICANT CHANGE UP (ref 150–400)
POTASSIUM SERPL-MCNC: 3.5 MMOL/L — SIGNIFICANT CHANGE UP (ref 3.5–5.3)
POTASSIUM SERPL-SCNC: 3.5 MMOL/L — SIGNIFICANT CHANGE UP (ref 3.5–5.3)
PROTHROM AB SERPL-ACNC: 15 SEC — HIGH (ref 10.6–13.6)
RBC # BLD: 3.44 M/UL — LOW (ref 4.2–5.8)
RBC # FLD: 14.1 % — SIGNIFICANT CHANGE UP (ref 10.3–14.5)
SODIUM SERPL-SCNC: 140 MMOL/L — SIGNIFICANT CHANGE UP (ref 135–145)
TSH SERPL-MCNC: 2.42 UIU/ML — SIGNIFICANT CHANGE UP (ref 0.27–4.2)
WBC # BLD: 7.1 K/UL — SIGNIFICANT CHANGE UP (ref 3.8–10.5)
WBC # FLD AUTO: 7.1 K/UL — SIGNIFICANT CHANGE UP (ref 3.8–10.5)

## 2021-10-11 PROCEDURE — 36430 TRANSFUSION BLD/BLD COMPNT: CPT

## 2021-10-11 PROCEDURE — 82553 CREATINE MB FRACTION: CPT

## 2021-10-11 PROCEDURE — 84100 ASSAY OF PHOSPHORUS: CPT

## 2021-10-11 PROCEDURE — 94003 VENT MGMT INPAT SUBQ DAY: CPT

## 2021-10-11 PROCEDURE — 94640 AIRWAY INHALATION TREATMENT: CPT

## 2021-10-11 PROCEDURE — 88307 TISSUE EXAM BY PATHOLOGIST: CPT

## 2021-10-11 PROCEDURE — 84443 ASSAY THYROID STIM HORMONE: CPT

## 2021-10-11 PROCEDURE — 80202 ASSAY OF VANCOMYCIN: CPT

## 2021-10-11 PROCEDURE — 85610 PROTHROMBIN TIME: CPT

## 2021-10-11 PROCEDURE — 84132 ASSAY OF SERUM POTASSIUM: CPT

## 2021-10-11 PROCEDURE — 97116 GAIT TRAINING THERAPY: CPT

## 2021-10-11 PROCEDURE — 86900 BLOOD TYPING SEROLOGIC ABO: CPT

## 2021-10-11 PROCEDURE — 82962 GLUCOSE BLOOD TEST: CPT

## 2021-10-11 PROCEDURE — 93005 ELECTROCARDIOGRAM TRACING: CPT

## 2021-10-11 PROCEDURE — 88304 TISSUE EXAM BY PATHOLOGIST: CPT

## 2021-10-11 PROCEDURE — 84295 ASSAY OF SERUM SODIUM: CPT

## 2021-10-11 PROCEDURE — 87070 CULTURE OTHR SPECIMN AEROBIC: CPT

## 2021-10-11 PROCEDURE — 97530 THERAPEUTIC ACTIVITIES: CPT

## 2021-10-11 PROCEDURE — 87106 FUNGI IDENTIFICATION YEAST: CPT

## 2021-10-11 PROCEDURE — 82803 BLOOD GASES ANY COMBINATION: CPT

## 2021-10-11 PROCEDURE — S2900: CPT

## 2021-10-11 PROCEDURE — 92610 EVALUATE SWALLOWING FUNCTION: CPT

## 2021-10-11 PROCEDURE — 86923 COMPATIBILITY TEST ELECTRIC: CPT

## 2021-10-11 PROCEDURE — 36415 COLL VENOUS BLD VENIPUNCTURE: CPT

## 2021-10-11 PROCEDURE — 80053 COMPREHEN METABOLIC PANEL: CPT

## 2021-10-11 PROCEDURE — 71045 X-RAY EXAM CHEST 1 VIEW: CPT

## 2021-10-11 PROCEDURE — P9016: CPT

## 2021-10-11 PROCEDURE — 86901 BLOOD TYPING SEROLOGIC RH(D): CPT

## 2021-10-11 PROCEDURE — 85025 COMPLETE CBC W/AUTO DIFF WBC: CPT

## 2021-10-11 PROCEDURE — C1889: CPT

## 2021-10-11 PROCEDURE — 87077 CULTURE AEROBIC IDENTIFY: CPT

## 2021-10-11 PROCEDURE — 83036 HEMOGLOBIN GLYCOSYLATED A1C: CPT

## 2021-10-11 PROCEDURE — 83735 ASSAY OF MAGNESIUM: CPT

## 2021-10-11 PROCEDURE — P9045: CPT

## 2021-10-11 PROCEDURE — 81001 URINALYSIS AUTO W/SCOPE: CPT

## 2021-10-11 PROCEDURE — 82330 ASSAY OF CALCIUM: CPT

## 2021-10-11 PROCEDURE — 80048 BASIC METABOLIC PNL TOTAL CA: CPT

## 2021-10-11 PROCEDURE — 85027 COMPLETE CBC AUTOMATED: CPT

## 2021-10-11 PROCEDURE — 71045 X-RAY EXAM CHEST 1 VIEW: CPT | Mod: 26

## 2021-10-11 PROCEDURE — 84484 ASSAY OF TROPONIN QUANT: CPT

## 2021-10-11 PROCEDURE — 93306 TTE W/DOPPLER COMPLETE: CPT

## 2021-10-11 PROCEDURE — 83605 ASSAY OF LACTIC ACID: CPT

## 2021-10-11 PROCEDURE — 82550 ASSAY OF CK (CPK): CPT

## 2021-10-11 PROCEDURE — 94002 VENT MGMT INPAT INIT DAY: CPT

## 2021-10-11 PROCEDURE — 86769 SARS-COV-2 COVID-19 ANTIBODY: CPT

## 2021-10-11 PROCEDURE — 97163 PT EVAL HIGH COMPLEX 45 MIN: CPT

## 2021-10-11 PROCEDURE — 80162 ASSAY OF DIGOXIN TOTAL: CPT

## 2021-10-11 PROCEDURE — 83880 ASSAY OF NATRIURETIC PEPTIDE: CPT

## 2021-10-11 PROCEDURE — 85730 THROMBOPLASTIN TIME PARTIAL: CPT

## 2021-10-11 PROCEDURE — 86850 RBC ANTIBODY SCREEN: CPT

## 2021-10-11 RX ORDER — DIGOXIN 250 MCG
1 TABLET ORAL
Qty: 30 | Refills: 0
Start: 2021-10-11 | End: 2021-11-09

## 2021-10-11 RX ORDER — DEXTROSE 50 % IN WATER 50 %
12.5 SYRINGE (ML) INTRAVENOUS ONCE
Refills: 0 | Status: DISCONTINUED | OUTPATIENT
Start: 2021-10-11 | End: 2021-10-11

## 2021-10-11 RX ORDER — FUROSEMIDE 40 MG
1 TABLET ORAL
Qty: 30 | Refills: 0
Start: 2021-10-11 | End: 2021-11-09

## 2021-10-11 RX ORDER — ATORVASTATIN CALCIUM 80 MG/1
1 TABLET, FILM COATED ORAL
Qty: 30 | Refills: 0
Start: 2021-10-11 | End: 2021-11-09

## 2021-10-11 RX ORDER — GLUCAGON INJECTION, SOLUTION 0.5 MG/.1ML
1 INJECTION, SOLUTION SUBCUTANEOUS ONCE
Refills: 0 | Status: DISCONTINUED | OUTPATIENT
Start: 2021-10-11 | End: 2021-10-11

## 2021-10-11 RX ORDER — ASPIRIN/CALCIUM CARB/MAGNESIUM 324 MG
1 TABLET ORAL
Qty: 30 | Refills: 0
Start: 2021-10-11 | End: 2021-11-09

## 2021-10-11 RX ORDER — AMIODARONE HYDROCHLORIDE 400 MG/1
1 TABLET ORAL
Qty: 40 | Refills: 0
Start: 2021-10-11 | End: 2021-11-09

## 2021-10-11 RX ORDER — POTASSIUM CHLORIDE 20 MEQ
1 PACKET (EA) ORAL
Qty: 30 | Refills: 0
Start: 2021-10-11 | End: 2021-11-09

## 2021-10-11 RX ORDER — OXYCODONE HYDROCHLORIDE 5 MG/1
1 TABLET ORAL
Qty: 12 | Refills: 0
Start: 2021-10-11 | End: 2021-10-13

## 2021-10-11 RX ORDER — APIXABAN 2.5 MG/1
1 TABLET, FILM COATED ORAL
Qty: 60 | Refills: 0
Start: 2021-10-11 | End: 2021-11-09

## 2021-10-11 RX ORDER — METOPROLOL TARTRATE 50 MG
1.5 TABLET ORAL
Qty: 135 | Refills: 0
Start: 2021-10-11 | End: 2021-11-09

## 2021-10-11 RX ORDER — METOPROLOL TARTRATE 50 MG
37.5 TABLET ORAL EVERY 8 HOURS
Refills: 0 | Status: DISCONTINUED | OUTPATIENT
Start: 2021-10-11 | End: 2021-10-11

## 2021-10-11 RX ORDER — METOPROLOL TARTRATE 50 MG
1 TABLET ORAL
Qty: 90 | Refills: 0
Start: 2021-10-11 | End: 2021-11-09

## 2021-10-11 RX ORDER — DEXTROSE 50 % IN WATER 50 %
15 SYRINGE (ML) INTRAVENOUS ONCE
Refills: 0 | Status: DISCONTINUED | OUTPATIENT
Start: 2021-10-11 | End: 2021-10-11

## 2021-10-11 RX ORDER — POTASSIUM CHLORIDE 20 MEQ
40 PACKET (EA) ORAL ONCE
Refills: 0 | Status: COMPLETED | OUTPATIENT
Start: 2021-10-11 | End: 2021-10-11

## 2021-10-11 RX ORDER — METOPROLOL TARTRATE 50 MG
0.5 TABLET ORAL
Qty: 45 | Refills: 0
Start: 2021-10-11 | End: 2021-11-09

## 2021-10-11 RX ORDER — DEXTROSE 50 % IN WATER 50 %
25 SYRINGE (ML) INTRAVENOUS ONCE
Refills: 0 | Status: DISCONTINUED | OUTPATIENT
Start: 2021-10-11 | End: 2021-10-11

## 2021-10-11 RX ORDER — SODIUM CHLORIDE 9 MG/ML
1000 INJECTION, SOLUTION INTRAVENOUS
Refills: 0 | Status: DISCONTINUED | OUTPATIENT
Start: 2021-10-11 | End: 2021-10-11

## 2021-10-11 RX ORDER — INSULIN LISPRO 100/ML
VIAL (ML) SUBCUTANEOUS
Refills: 0 | Status: DISCONTINUED | OUTPATIENT
Start: 2021-10-11 | End: 2021-10-11

## 2021-10-11 RX ORDER — PANTOPRAZOLE SODIUM 20 MG/1
1 TABLET, DELAYED RELEASE ORAL
Qty: 30 | Refills: 0
Start: 2021-10-11 | End: 2021-11-09

## 2021-10-11 RX ADMIN — SODIUM CHLORIDE 3 MILLILITER(S): 9 INJECTION INTRAMUSCULAR; INTRAVENOUS; SUBCUTANEOUS at 06:13

## 2021-10-11 RX ADMIN — Medication 40 MILLIEQUIVALENT(S): at 10:54

## 2021-10-11 RX ADMIN — Medication 250 MICROGRAM(S): at 05:21

## 2021-10-11 RX ADMIN — LEVALBUTEROL 0.63 MILLIGRAM(S): 1.25 SOLUTION, CONCENTRATE RESPIRATORY (INHALATION) at 05:16

## 2021-10-11 RX ADMIN — LEVALBUTEROL 0.63 MILLIGRAM(S): 1.25 SOLUTION, CONCENTRATE RESPIRATORY (INHALATION) at 00:05

## 2021-10-11 RX ADMIN — Medication 25 MILLIGRAM(S): at 05:16

## 2021-10-11 RX ADMIN — CHLORHEXIDINE GLUCONATE 1 APPLICATION(S): 213 SOLUTION TOPICAL at 05:17

## 2021-10-11 RX ADMIN — Medication 40 MILLIGRAM(S): at 05:16

## 2021-10-11 RX ADMIN — PANTOPRAZOLE SODIUM 40 MILLIGRAM(S): 20 TABLET, DELAYED RELEASE ORAL at 06:50

## 2021-10-11 RX ADMIN — AMIODARONE HYDROCHLORIDE 400 MILLIGRAM(S): 400 TABLET ORAL at 05:17

## 2021-10-11 RX ADMIN — APIXABAN 5 MILLIGRAM(S): 2.5 TABLET, FILM COATED ORAL at 05:16

## 2021-10-11 RX ADMIN — Medication 20 MILLIEQUIVALENT(S): at 10:55

## 2021-10-11 RX ADMIN — PIPERACILLIN AND TAZOBACTAM 200 GRAM(S): 4; .5 INJECTION, POWDER, LYOPHILIZED, FOR SOLUTION INTRAVENOUS at 05:15

## 2021-10-11 RX ADMIN — Medication 81 MILLIGRAM(S): at 10:55

## 2021-10-11 NOTE — PROGRESS NOTE ADULT - REASON FOR ADMISSION
possible decortication for loculated pleural effusion

## 2021-10-11 NOTE — PROGRESS NOTE ADULT - ASSESSMENT
D/C Instructions:   Vivek Fabian)  Cardiovascular Disease; Internal Medicine  [address], [facility], [address]th [place] [address]  Phone: [phone]  Fax: [phone]  Follow Up Time:     Morales Loera)  Surgery; Thoracic Surgery  [address], [address] [place] [address]  Phone: [phone]  Fax: [phone]  Scheduled Appointment: 10/21/2021 09:45 AM    Atif Lima  CARDIAC ELECTROPHYSIOLOGY  [address] [place] [address]  Phone: [phone]  Fax: [phone]  Follow Up Time:  ALAINA VILLEDA OK ; 10/21/2021 ; P [facility] [address]  -Please attend your discharge appointments.   -Please call Dr. Fabian's office for an appointment within 1-2 weeks of discharge from the hospital.   -Please make sure to see Dr. Lima (Electrophysiology) in 3-4 weeks from discharge from the hospital  DASH Diet  No heavy lifting/straining, Showering allowed, Stairs allowed, Walking - Indoors allowed, Walking - Outdoors allowed  -Walk daily as tolerated and use your incentive spirometer 10 times every hour while you are awake.     -Please weigh yourself daily. If you notice over a 3 pound weight gain in 3 days, this is a sign you are likely retaining too much fluid. It is imperative you call our right away with unexplained rapid weight gain.      -Please continue to wear the compression stockings given to you in the hospital at home. This is a way to prevent fluid from building up in your legs.     -No driving or strenuous activity/exercise until cleared by your surgeon.    -Gently clean your incisions with unscented/antibacterial soap and water, pat dry.  You may leave them open to air.    -Call your doctor if you have shortness of breath, chest pain not relieved by pain medication, dizziness, fever >101.5, or increased redness or drainage from incisions.

## 2021-10-11 NOTE — DISCHARGE NOTE PROVIDER - NSDCCPTREATMENT_GEN_ALL_CORE_FT
PRINCIPAL PROCEDURE  Procedure: Bronchoscopy, with lung decortication using VATS  Findings and Treatment: RIGHT VATS chest tube x3

## 2021-10-11 NOTE — PROGRESS NOTE ADULT - SUBJECTIVE AND OBJECTIVE BOX
Patient discussed on morning rounds with Dr. Jarquin    Operation / Date: 10/4/21 -- R VATS RA decortication, evacuation of hemothorax    Surgeon: Dr. Jarquin    Referring Physician: Dr. Vivek Fabian     SUBJECTIVE ASSESSMENT  Pt feels well, looking forward to going home. Eating/drinking well. Moving bowels daily. Urinates normally. Denies any CP, palpitations, SOB, wheezing, abd pain, n/v/d/c ,fevers or chills.     HOSPITAL COURSE:  70 y/o [place]/English speaking male, former smoker, with PMHx severe mitral valve insufficiency who underwent MVR with Dr. Jarquin on 9/20/21, atrial fibrillation, HFrEF (30%), CAD (s/p PCI 2018), HTN, HLD, DM, tonsil cancer (s/p surgical intervention and radiation (2019), who presented to [facility] 10/1 with acute SOB. Previous hospital course s/p MVR was complicated by right hemothorax requiring thoracentesis (dry tap) and post op AF. At [facility] ED he was noted to be hypotensive with SBP in the 90s. TTE revealed large pericardial effusion with signs of tamponade. Dr. Pena took the patient to the OR on 10/2 at [facility] for a subxiphoid pericardial window, draining 500 cc of fluid. OR was uncomplicated. On 10/3 he was transferred to [facility] CTICU intubated and sedated under the care of Dr. Jarquin for further care. On 10/4/21 he underwent an uncomplicated R VATS decortication, evacuation of hemothorax. Post-operatively he arrived back to the CTICU intubated with 3 chest tubes in place. He was noted to be hypoxic on arrival but FiO2 weaned to 80% by the end of the day. Intermittent AF with RVR, amiodarone gtt started. POD1 he underwent bedside bronchoscopy which revealed purulent sputum and antibiotics were started. POD2 he was extubated after repeat bronchoscopy. Pericardial drain removed. POD3 patient with continued AF with intermittent RVR. POD4 patient was transferred to [facility] still in AF, rate controlled with intermittent RVR. POD5 Amiodarone Bolus x1, amio gtt, PO load started for persistent RVR with aberrancy. POD6 patient remains stable, better rate control. POD7 remains rate controlled in AF. Per Dr. Loera and Dr. Jarquin, pt is medically ready to be discharged home on current regimen. Eating/drinking well, moving bowels regularly. Urinating appropriately, moving bowels regularly.     Over 35 minutes was spent with the patient reviewing the discharge material including medications, follow up appointments, recovery, concerning symptoms, and how to contact their health care providers if they have questions    Vital Signs Last 24 Hrs  T(C): 36.2 (11 Oct 2021 09:34), Max: 36.3 (10 Oct 2021 18:37)  T(F): 97.2 (11 Oct 2021 09:34), Max: 97.3 (10 Oct 2021 18:37)  HR: 65 (11 Oct 2021 09:01) (64 - 94)  BP: 141/80 (11 Oct 2021 09:01) (129/96 - 156/77)  BP(mean): 104 (11 Oct 2021 09:01) (94 - 109)  RR: 16 (11 Oct 2021 09:01) (16 - 20)  SpO2: 97% (11 Oct 2021 09:01) (92% - 99%)    EPICARDIAL WIRES REMOVED: n/a  TIE DOWNS REMOVED: n/a    PHYSICAL EXAM:  General: well appearing sitting in chair in NAD   Neurological: AOx3. Motor skills grossly intact  Cardiovascular: Normal S1/S2. Regular rate/rhythm. No murmurs  Respiratory: Lungs CTA bilaterally. No wheezing or rales  Gastrointestinal: +BS in all 4 quadrants. Non-distended. Soft. Non-tender  Extremities: Strength 5/5 b/l upper/lower extremities. Sensation grossly intact upper/lower extremities. No edema. No calf tenderness.  Vascular: Radial 2+bilaterally, DP 2+ b/l  Incision Sites: subxiphoid incision healing well no erythema, purulence or ecchymosis. VATS incisions healing well no erythema, purulence or ecchymosis     LABS:                        9.8    7.10  )-----------( 297      ( 11 Oct 2021 05:37 )             30.8     COUMADIN: no    PT/INR - ( 11 Oct 2021 05:37 )   PT: 15.0 sec;   INR: 1.26     PTT - ( 11 Oct 2021 05:37 )  PTT:31.0 sec    10-11    140  |  104  |  10  ----------------------------<  120<H>  3.5   |  26  |  0.92    Ca    8.8      11 Oct 2021 05:37  Phos  2.5     10-09  Mg     2.0     10-11    TPro  6.4  /  Alb  3.6  /  TBili  1.0  /  DBili  x   /  AST  33  /  ALT  47<H>  /  AlkPhos  125<H>  10-09    Discharge CXR:  < from: Xray Chest 1 View-PORTABLE IMMEDIATE (Xray Chest 1 View-PORTABLE IMMEDIATE .) (10.08.21 @ 13:30) >  IMPRESSION: Status post right upper lobe chest tube removal.  < end of copied text >

## 2021-10-11 NOTE — DISCHARGE NOTE PROVIDER - PROVIDER TOKENS
PROVIDER:[TOKEN:[[id number]]],PROVIDER:[TOKEN:[[id number]]] PROVIDER:[TOKEN:[[id number]]],PROVIDER:[TOKEN:[[id number]],SCHEDULEDAPPT:[10/21/2021],SCHEDULEDAPPTTIME:[09:45 AM]],PROVIDER:[TOKEN:[[id number]]] PROVIDER:[TOKEN:[[id number]]],PROVIDER:[TOKEN:[[id number]],SCHEDULEDAPPT:[10/21/2021],SCHEDULEDAPPTTIME:[09:45 AM]],PROVIDER:[TOKEN:[[id number]],SCHEDULEDAPPT:[11/18/2021],SCHEDULEDAPPTTIME:[10:20 AM]]

## 2021-10-11 NOTE — DISCHARGE NOTE PROVIDER - CARE PROVIDERS DIRECT ADDRESSES
,[email or web address]

## 2021-10-11 NOTE — DISCHARGE NOTE PROVIDER - NSDCFUADDAPPT_GEN_ALL_CORE_FT
-Please attend your discharge appointments.  -Please attend your discharge appointments.   -Please call Dr. Fabian's office for an appointment within 1-2 weeks of discharge from the hospital.   -Please make sure to see Dr. Lima (Electrophysiology) in 3-4 weeks from discharge from the hospital

## 2021-10-11 NOTE — PROGRESS NOTE ADULT - PROVIDER SPECIALTY LIST ADULT
CT Surgery
Critical Care
Critical Care
Electrophysiology
CT Surgery
Critical Care
CT Surgery
CT Surgery
Critical Care
Critical Care
Electrophysiology
CT Surgery

## 2021-10-11 NOTE — DISCHARGE NOTE PROVIDER - HOSPITAL COURSE
70 y/o [place]/English speaking male, former smoker, with PMHx severe mitral valve insufficiency who underwent MVR with Dr. 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 Previous hospital course s/p MVR was complicated by right hemothorax requiring thoracentesis (dry tap) and post op AF. At [facility] ED he was noted to be hypotensive with SBP in the 90s. TTE revealed large pericardial effusion with signs of tamponade. Dr. Pena took the patient to the OR on 10/2 at [facility] for a subxiphoid pericardial window, draining 500 cc of fluid. OR was uncomplicated. On 10/3 he was transferred to [facility]ICU intubated and sedated under the care of Dr. Jarquin for further care. On 10/4/21 he underwent an uncomplicated R VATS decortication, evacuation of hemothorax. Post-operatively he arrived back to the CTICU intubated with 3 chest tubes in place. He was noted to be hypoxic on arrival but FiO2 weaned to 80% by the end of the day. Intermittent AF with RVR, amiodarone gtt started. POD1 he underwent bedside bronchoscopy which revealed purulent sputum and antibiotics were started. POD2 he was extubated after repeat bronchoscopy. Pericardial drain removed. POD3 patient with continued AF with intermittent RVR. POD4 patient was transferred to [facility] still in AF, rate controlled with intermittent RVR. POD5 Amiodarone Bolus x1, amio gtt, PO load started for persistent RVR with aberrancy. POD6 patient remains stable, better rate control. POD7 remains rate controlled in AF. Per Dr. Loera and Dr. Jarquin, pt is medically ready to be discharged home on current regimen. Eating/drinking well, moving bowels regularly. Urinating appropriately, moving bowels regularly.     Over 35 minutes was spent with the patient reviewing the discharge material including medications, follow up appointments, recovery, concerning symptoms, and how to contact their health care providers if they have questions

## 2021-10-11 NOTE — DISCHARGE NOTE NURSING/CASE MANAGEMENT/SOCIAL WORK - NSDCFUADDAPPT_GEN_ALL_CORE_FT
-Please attend your discharge appointments.   -Please call Dr. Fabian's office for an appointment within 1-2 weeks of discharge from the hospital.   -Please make sure to see Dr. Lima (Electrophysiology) in 3-4 weeks from discharge from the hospital

## 2021-10-11 NOTE — DISCHARGE NOTE PROVIDER - CARE PROVIDER_API CALL
Vivek Fabian)  Cardiovascular Disease; Internal Medicine  [address] [place] [address]  Phone: [phone]  Fax: [phone]  Follow Up Time:     Morales Loera)  Surgery; Thoracic Surgery  [address], [address] [place] [address]  Phone: [phone]  Fax: [phone]  Follow Up Time:    Vivek Fabian)  Cardiovascular Disease; Internal Medicine  [address], [address] [place] [address]  Phone: [phone]  Fax: [phone]  Follow Up Time:     Morales Loera)  Surgery; Thoracic Surgery  [address] [place] [address]  Phone: [phone]  Fax: [phone]  Scheduled Appointment: 10/21/2021 09:45 AM    Atif Lima  CARDIAC ELECTROPHYSIOLOGY  [address] [address]  Phone: [phone]  Fax: [phone]  Follow Up Time:    Vivek Fabian)  Cardiovascular Disease; Internal Medicine  [address], [address] [place] [address]  Phone: [phone]  Fax: [phone]  Follow Up Time:     Morales Loera)  Surgery; Thoracic Surgery  [address] [place] [address]  Phone: [phone]  Fax: [phone]  Scheduled Appointment: 10/21/2021 09:45 AM    Atif Lima  CARDIAC ELECTROPHYSIOLOGY  [address] [place] [address]  Phone: [phone]  Fax: [phone]  Scheduled Appointment: 11/18/2021 10:20 AM

## 2021-10-11 NOTE — DISCHARGE NOTE PROVIDER - NSDCMRMEDTOKEN_GEN_ALL_CORE_FT
amiodarone 200 mg oral tablet: 2 tabs every 12 hours  for 3 days (10/11-10/13) and then 1 tab once a day starting 10/14   apixaban 5 mg oral tablet: 1 tab(s) orally every 12 hours  aspirin 81 mg oral delayed release tablet: 1 tab(s) orally once a day  atorvastatin 20 mg oral tablet: 1 tab(s) orally once a day (at bedtime)  digoxin 250 mcg (0.25 mg) oral tablet: 1 tab(s) orally once a day  furosemide 40 mg oral tablet: 1 tab(s) orally once a day  metoprolol tartrate 37.5 mg oral tablet: 1 tab(s) orally every 8 hours  oxyCODONE 5 mg oral tablet: 1 tab(s) orally every 6 hours, As Needed -Moderate Pain (4 - 6) MDD:4 tabs  pantoprazole 40 mg oral delayed release tablet: 1 tab(s) orally once a day (before a meal)  potassium chloride 20 mEq oral powder for reconstitution: 1 packet(s) orally once with lasix   amiodarone 200 mg oral tablet: 2 tabs every 12 hours  for 3 days (10/11-10/13) and then 1 tab once a day starting 10/14   apixaban 5 mg oral tablet: 1 tab(s) orally every 12 hours  aspirin 81 mg oral delayed release tablet: 1 tab(s) orally once a day  atorvastatin 20 mg oral tablet: 1 tab(s) orally once a day (at bedtime)  digoxin 250 mcg (0.25 mg) oral tablet: 1 tab(s) orally once a day  furosemide 40 mg oral tablet: 1 tab(s) orally once a day  metoprolol tartrate 75 mg oral tablet: 0.5 tab(s) orally every 8 hours   oxyCODONE 5 mg oral tablet: 1 tab(s) orally every 6 hours, As Needed -Moderate Pain (4 - 6) MDD:4 tabs  pantoprazole 40 mg oral delayed release tablet: 1 tab(s) orally once a day (before a meal)  potassium chloride 20 mEq oral powder for reconstitution: 1 packet(s) orally once with lasix

## 2021-10-12 ENCOUNTER — NON-APPOINTMENT (OUTPATIENT)
Age: 71
End: 2021-10-12

## 2021-10-14 LAB — SURGICAL PATHOLOGY STUDY: SIGNIFICANT CHANGE UP

## 2021-10-17 ENCOUNTER — APPOINTMENT (OUTPATIENT)
Dept: DISASTER EMERGENCY | Facility: CLINIC | Age: 71
End: 2021-10-17

## 2021-10-18 ENCOUNTER — APPOINTMENT (OUTPATIENT)
Dept: CARDIOLOGY | Facility: CLINIC | Age: 71
End: 2021-10-18

## 2021-10-21 ENCOUNTER — APPOINTMENT (OUTPATIENT)
Dept: THORACIC SURGERY | Facility: CLINIC | Age: 71
End: 2021-10-21

## 2021-10-25 ENCOUNTER — TRANSCRIPTION ENCOUNTER (OUTPATIENT)
Age: 71
End: 2021-10-25

## 2021-10-26 PROCEDURE — 94003 VENT MGMT INPAT SUBQ DAY: CPT

## 2021-10-26 PROCEDURE — 85014 HEMATOCRIT: CPT

## 2021-10-26 PROCEDURE — 82803 BLOOD GASES ANY COMBINATION: CPT

## 2021-10-26 PROCEDURE — 93306 TTE W/DOPPLER COMPLETE: CPT

## 2021-10-26 PROCEDURE — 84484 ASSAY OF TROPONIN QUANT: CPT

## 2021-10-26 PROCEDURE — 85027 COMPLETE CBC AUTOMATED: CPT

## 2021-10-26 PROCEDURE — C1889: CPT

## 2021-10-26 PROCEDURE — 96375 TX/PRO/DX INJ NEW DRUG ADDON: CPT

## 2021-10-26 PROCEDURE — P9045: CPT

## 2021-10-26 PROCEDURE — 87040 BLOOD CULTURE FOR BACTERIA: CPT

## 2021-10-26 PROCEDURE — 93005 ELECTROCARDIOGRAM TRACING: CPT

## 2021-10-26 PROCEDURE — 84295 ASSAY OF SERUM SODIUM: CPT

## 2021-10-26 PROCEDURE — 85018 HEMOGLOBIN: CPT

## 2021-10-26 PROCEDURE — 83880 ASSAY OF NATRIURETIC PEPTIDE: CPT

## 2021-10-26 PROCEDURE — 86901 BLOOD TYPING SEROLOGIC RH(D): CPT

## 2021-10-26 PROCEDURE — 31720 CLEARANCE OF AIRWAYS: CPT

## 2021-10-26 PROCEDURE — 82435 ASSAY OF BLOOD CHLORIDE: CPT

## 2021-10-26 PROCEDURE — 36415 COLL VENOUS BLD VENIPUNCTURE: CPT

## 2021-10-26 PROCEDURE — 82550 ASSAY OF CK (CPK): CPT

## 2021-10-26 PROCEDURE — 84100 ASSAY OF PHOSPHORUS: CPT

## 2021-10-26 PROCEDURE — 80053 COMPREHEN METABOLIC PANEL: CPT

## 2021-10-26 PROCEDURE — 82962 GLUCOSE BLOOD TEST: CPT

## 2021-10-26 PROCEDURE — 71250 CT THORAX DX C-: CPT

## 2021-10-26 PROCEDURE — 94002 VENT MGMT INPAT INIT DAY: CPT

## 2021-10-26 PROCEDURE — 86900 BLOOD TYPING SEROLOGIC ABO: CPT

## 2021-10-26 PROCEDURE — 83735 ASSAY OF MAGNESIUM: CPT

## 2021-10-26 PROCEDURE — 85730 THROMBOPLASTIN TIME PARTIAL: CPT

## 2021-10-26 PROCEDURE — 82947 ASSAY GLUCOSE BLOOD QUANT: CPT

## 2021-10-26 PROCEDURE — 82330 ASSAY OF CALCIUM: CPT

## 2021-10-26 PROCEDURE — 84443 ASSAY THYROID STIM HORMONE: CPT

## 2021-10-26 PROCEDURE — 94660 CPAP INITIATION&MGMT: CPT

## 2021-10-26 PROCEDURE — U0003: CPT

## 2021-10-26 PROCEDURE — 86923 COMPATIBILITY TEST ELECTRIC: CPT

## 2021-10-26 PROCEDURE — 82565 ASSAY OF CREATININE: CPT

## 2021-10-26 PROCEDURE — 96374 THER/PROPH/DIAG INJ IV PUSH: CPT

## 2021-10-26 PROCEDURE — 84132 ASSAY OF SERUM POTASSIUM: CPT

## 2021-10-26 PROCEDURE — 83605 ASSAY OF LACTIC ACID: CPT

## 2021-10-26 PROCEDURE — 71045 X-RAY EXAM CHEST 1 VIEW: CPT

## 2021-10-26 PROCEDURE — 85610 PROTHROMBIN TIME: CPT

## 2021-10-26 PROCEDURE — 80061 LIPID PANEL: CPT

## 2021-10-26 PROCEDURE — 86850 RBC ANTIBODY SCREEN: CPT

## 2021-10-26 PROCEDURE — 99291 CRITICAL CARE FIRST HOUR: CPT | Mod: 25

## 2021-10-26 PROCEDURE — 85025 COMPLETE CBC W/AUTO DIFF WBC: CPT

## 2021-10-26 PROCEDURE — C8929: CPT

## 2021-10-26 PROCEDURE — 86769 SARS-COV-2 COVID-19 ANTIBODY: CPT

## 2021-10-26 PROCEDURE — 83036 HEMOGLOBIN GLYCOSYLATED A1C: CPT

## 2021-10-26 PROCEDURE — 82553 CREATINE MB FRACTION: CPT

## 2021-11-04 ENCOUNTER — APPOINTMENT (OUTPATIENT)
Dept: HEART AND VASCULAR | Facility: CLINIC | Age: 71
End: 2021-11-04

## 2021-11-12 LAB
CULTURE RESULTS: SIGNIFICANT CHANGE UP
SPECIMEN SOURCE: SIGNIFICANT CHANGE UP

## 2023-01-06 NOTE — DISCHARGE NOTE PROVIDER - REASON FOR ADMISSION
[address] [address]  INPATIENT PHYSICAL THERAPY  DAILY NOTE  [facility] - [id number]    Time In: 1020  Time Out: 1120  Timed Code Treatment Minutes: 60 Minutes  Minutes: 60          Date: 2023  Patient Name: Shawn Low,  Gender:  male        MRN: [id number]  : 1960  (63 y.o.)     Referring Practitioner: Dr. Eva Tafoya  Diagnosis: Acute stroke due to ischemia  Additional Pertinent Hx: Pt admitted through ED due to RUE weakness and slurred speech,  Also noted to have sepsis, LC, emphysematous cystitis. Hx:  HTN, Db, CAD, recent CABG (). MRI + for acute ischemic stroke Lt parietal region     Prior Level of Function:  Lives With: Spouse, Family (16 y.o son, 21 y.o step dtr.)  Type of Home: House  Home Layout: One level  Home Access: Stairs to enter with rails  Entrance Stairs - Number of Steps: 2 + threshold  Entrance Stairs - Rails: Both  Home Equipment:  (None used PTA)   Bathroom Shower/Tub: Tub/Shower unit  Bathroom Toilet: Standard  Bathroom Equipment: Tub transfer bench  Bathroom Accessibility: Accessible    ADL Assistance: Independent  Homemaking Assistance: Independent  Homemaking Responsibilities: Yes  Ambulation Assistance: Independent  Transfer Assistance: Independent  Active : No    Restrictions/Precautions:  Restrictions/Precautions: General Precautions, Fall Risk  Position Activity Restriction  Other position/activity restrictions: right side hemiparesis, recent CABG -minimize arm use ( s/p 5 wks), life vest     SUBJECTIVE: Patient in room standing in front of chair, nursing student present. Pt agreeable to PT.   Pt cooperative and pleasant, talkative, frustrated with decreased sensation/control of right knee    PAIN: no complaints of pain    Vitals: Oxygen: 100% on room air on NuStep, Heart rate 100 bpm    OBJECTIVE:  Bed Mobility:  Not Tested    Transfers:  Sit to Stand: Contact Guard Assistance; min assist one time at end of session due to right lateral lean. Verbal cues for hand placement to maintain sternal precautions  Stand to Sit:Contact Guard Assistance--good control noted    *Completed multiple sit < > stand transfers and from various surfaces including recliner in apartment, couch, wheelchair, NuStep and recliner in room. *Education on having assistance when getting up from surfaces in apartment with apartment stay from wife. Education on continued close arm proximity to body with sit to stand to maintain sternal precautions. Education on sitting near end of couch to have firm surface to push up from as couch tends to be lower. Ambulation:  Contact Guard Assistance  Distance: 80', 40' and 10'x2 in apartment, 39' weaving in and out of cones, 45', 5'x2  Surface: Level Tile  Device:Rolling Walker  Gait Deviations: Forward Flexed Posture, Slow Jada, Decreased Step Length on Right, Decreased Weight Shift Right, Decreased Gait Speed, and Decreased Heel Strike Bilaterally  *Verbal cues and manual for right step length, verbal cues for upright posture    Patient weaved in and out of cones with RW.  Verbal cues to keep LE's within frame of RW as pt's right leg tending to step outside of frame this date. Stairs:  Contact Guard Assistance  Number of Steps: 4  Height: 6\" step with Bilateral Handrails  *reciprocal pattern to ascend, non-reciprocal pattern to descend. PT guarded right knee initially for safety      Balance:  Static Standing Balance: Contact Guard Assistance  Dynamic Standing Balance: Contact Guard Assistance, Minimal Assistance    Neuromuscular education:  Patient stood and completed forward foot taps to 2 balance pods, alternating right and left. Verbal cues and tactile cues for right quad stability with weight shift to left. Pt demonstrating decreased weight shift to stance leg at times, requiring verbal cues. Trialled a second time after seated rest break and utilized mirror for visual feedback.   Pt notes that he really cannot feel the back of his knee and is unable to determine what his knee is doing. Patient completed forward step ups on 6\" step x7 each LE. Verbal cues for right quad stability when ascending. Decreased weight shift to right at times  *activities completed to improve lateral weight shift, LE proprioception and SLS. Exercise:  Patient was guided in 1 set(s) 10 reps of exercise to both lower extremities. -Standing: terminal knee extension right LE with blue theraband resisting--tactile and verbal cues for technique.  -NuStep: level 2 for 3 minutes with LE's only. Verbal cues for right LE positioning to prevent abduction. Difficulty noted maintaining positioning of right LE, however pt able to. After seated rest break pt completed an additional 3.4 minutes. Resistance increased to level 3 for 1 minute and then back to level 2. At 5 minute liliya pt's seat moved from 10 to 11 to increase knee extension  Pt's pants rolled up so he could see right knee and attempt to maintain control and prevent knee hyperextension.    -  Exercises were completed for increased independence with functional mobility. Functional Outcome Measures: Not completed       ASSESSMENT:  Assessment: Patient progressing toward established goals. Activity Tolerance:  Patient tolerance of  treatment: good.       Equipment Recommendations:Equipment Needed: Yes  Other: RW  Discharge Recommendations: Continue to assess pending progress, 24 hour assistance or supervision, and Patient would benefit from continued PT at discharge  Plan: Current Treatment Recommendations: Strengthening, ROM, Balance training, Functional mobility training, Transfer training, Endurance training, Neuromuscular re-education, Gait training, Stair training, Safety education & training, Equipment evaluation, education, & procurement, Home exercise program, Therapeutic activities, Patient/Caregiver education & training  General Plan:  (5x/ wk 90 min, 1x/ wk 30 min)    Patient Education  Patient Education: Transfers, Gait, Stairs, Verbal Exercise Instruction, Health Promotion and Wellness Education, Home Safety Education,  - Patient Verbalized Understanding, - Patient Requires Continued Education    Goals:  Patient Goals : get back to my normal way of doing things  Short Term Goals  Time Frame for Short Term Goals: 1 wk  Short Term Goal 1: Pt to go supine <->sit, minimal use of UEs, SBA to get in/out of bed. Short Term Goal 2: Pt to get up/down from various seated surfaces, SBA, to get up to walk. Short Term Goal 3: Pt to walk with RW >= 50 ft, demonstrating step through gait pattern, recurvatum < 10% of steps on RT, CGA to progress to home mobility  Short Term Goal 4: Pt to ascend/descend 2 steps with gerber rails, CGA to progress to home entry. GOAL MET, SEE LTG  Short Term Goal 5: Pt to get in/out of car, SBA for transportation needs. Additional Goals?: Yes  Short Term Goal 6: Pt to perform Tinetti >= 18/28, demonstrating improved balance. Long Term Goals  Time Frame for Long Term Goals : 3 wks from evaluation  Long Term Goal 1: Pt to go supine <->sit, minimal use of UEs, Mod I, to get in/out of bed. Long Term Goal 2: Pt to get up/down from various seated surfaces, Mod I, to get up to walk. Long Term Goal 3: Pt to walk with RW >= 50 ft, demonstrating step through gait pattern, recurvatum < 10% of steps on RT, Mod I, for home mobility  Long Term Goal 4: Pt to perform Tinetti >= 24/28, demonstrating improved balance. Long Term Goal 5: Patient will ascend/descend 2 steps with bilateral hand rails with SBA for safe home entry. Additional Goals?: Yes  Long term goal 6: Patient will complete car transfer with modified independence with safe technique to transfer in and out of vehicle safely. Following session, patient left in safe position with all fall risk precautions in place. MR

## 2023-01-26 ENCOUNTER — NON-APPOINTMENT (OUTPATIENT)
Age: 73
End: 2023-01-26

## 2023-01-26 NOTE — ED CLERICAL - NS ED CLERK PCP INFORMATION
Patient awake in bed, alert to self and place. In no acute distress. Denies pain. Has not voided or had a BM     Yes

## 2023-11-10 NOTE — ED PROVIDER NOTE - NSICDXPASTSURGICALHX_GEN_ALL_CORE_FT
Patient requesting [facility] and has not discussed in last office visit.  We will need a F2F PAST SURGICAL HISTORY:  No significant past surgical history

## 2023-11-17 NOTE — PRE-OP CHECKLIST - ANTIBIOTIC
n/a

## 2024-03-31 NOTE — ED ADULT TRIAGE NOTE - HISTORY OF COVID-19 VACCINATION
Advair Diskus 500 mcg-50 mcg inhalation powder: 1 puff(s) inhaled 2 times a day  albuterol 90 mcg/inh inhalation aerosol: 2 puff(s) inhaled every 6 hours as needed for  shortness of breath and/or wheezing  ALPRAZolam 2 mg oral tablet: 1 tab(s) orally every 6 hours, As needed, anxiety  Ambien 10 mg oral tablet: 0.5 tab(s) orally once a day (at bedtime), As Needed  Aspir 81 oral delayed release tablet: 1 tab(s) orally once a day  fenofibrate 134 mg oral capsule: 1 cap(s) orally once a day  ipratropium-albuterol 0.5 mg-2.5 mg/3 mL inhalation solution: 3 milliliter(s) inhaled every 6 hours  Lasix 40 mg oral tablet: 1 tab(s) orally once a day  morphine 100 mg/12 to 24 hr oral capsule, extended release: 1 cap(s) orally 2 times a day  omeprazole 40 mg oral delayed release capsule: 1 cap(s) orally once a day  predniSONE 20 mg oral tablet: 2 tab(s) orally once a day  Spiriva HandiHaler 18 mcg inhalation capsule: 1 cap(s) inhaled once a day  Zocor 20 mg oral tablet: 1 tab(s) orally once a day (at bedtime)   Vaccine status unknown

## 2024-07-19 NOTE — PATIENT PROFILE ADULT - HOW PATIENT ADDRESSED, PROFILE
Last visit: 09/12/23  Last Med refill:   Does patient have enough medication for 72 hours: No:     Next Visit Date:  No future appointments.    Health Maintenance   Topic Date Due    Hepatitis B vaccine (1 of 3 - 3-dose series) Never done    COVID-19 Vaccine (1) Never done    Varicella vaccine (1 of 2 - 2-dose childhood series) Never done    Pneumococcal 0-64 years Vaccine (1 of 2 - PCV) Never done    HIV screen  Never done    DTaP/Tdap/Td vaccine (1 - Tdap) Never done    Lipids  Never done    Flu vaccine (1) 08/01/2024    Depression Screen  09/12/2024    Hepatitis C screen  Completed    Hepatitis A vaccine  Aged Out    Hib vaccine  Aged Out    HPV vaccine  Aged Out    Polio vaccine  Aged Out    Meningococcal (ACWY) vaccine  Aged Out       No results found for: \"LABA1C\"          ( goal A1C is < 7)   No components found for: \"LABMICR\"  No components found for: \"LDLCHOLESTEROL\", \"LDLCALC\"    (goal LDL is <100)   AST (U/L)   Date Value   04/19/2023 22     ALT (U/L)   Date Value   04/19/2023 21     BUN (mg/dL)   Date Value   04/19/2023 14     BP Readings from Last 3 Encounters:   09/12/23 (!) 138/99   04/19/23 (!) 142/99   09/15/22 (!) 121/100          (goal 120/80)    All Future Testing planned in [facility]  Lab Frequency Next Occurrence               Patient Active Problem List:     Severe persistent asthma in adult without complication     Mild persistent asthma without complication            Mr. Wong

## 2024-09-20 NOTE — DISCHARGE NOTE NURSING/CASE MANAGEMENT/SOCIAL WORK - PATIENT PORTAL LINK FT
Detail Level: Detailed
Quality 226: Preventive Care And Screening: Tobacco Use: Screening And Cessation Intervention: Patient screened for tobacco use and is an ex/non-smoker
Quality 111:Pneumonia Vaccination Status For Older Adults: Patient received any pneumococcal conjugate or polysaccharide vaccine on or after their 60th birthday and before the end of the measurement period
You can access the FollowMyHealth Patient Portal offered by [facility] by registering at the following website: [email or web address]. By joining [employer]’s FollowMyHealth portal, you will also be able to view your health information using other applications (apps) compatible with our system.
Quality 130: Documentation Of Current Medications In The Medical Record: Current Medications Documented
No
